# Patient Record
Sex: FEMALE | Race: BLACK OR AFRICAN AMERICAN | NOT HISPANIC OR LATINO | Employment: FULL TIME | URBAN - METROPOLITAN AREA
[De-identification: names, ages, dates, MRNs, and addresses within clinical notes are randomized per-mention and may not be internally consistent; named-entity substitution may affect disease eponyms.]

---

## 2017-01-27 ENCOUNTER — OFFICE VISIT (OUTPATIENT)
Dept: INTERNAL MEDICINE | Facility: CLINIC | Age: 42
End: 2017-01-27

## 2017-01-27 VITALS
DIASTOLIC BLOOD PRESSURE: 80 MMHG | BODY MASS INDEX: 51.71 KG/M2 | TEMPERATURE: 98.3 F | HEIGHT: 62 IN | HEART RATE: 72 BPM | SYSTOLIC BLOOD PRESSURE: 110 MMHG | WEIGHT: 281 LBS | OXYGEN SATURATION: 98 %

## 2017-01-27 DIAGNOSIS — E55.9 VITAMIN D DEFICIENCY: ICD-10-CM

## 2017-01-27 DIAGNOSIS — E66.01 MORBID OBESITY, UNSPECIFIED OBESITY TYPE (HCC): ICD-10-CM

## 2017-01-27 DIAGNOSIS — E11.9 DIABETES MELLITUS TYPE 2, NONINSULIN DEPENDENT (HCC): ICD-10-CM

## 2017-01-27 DIAGNOSIS — J30.2 SEASONAL ALLERGIC RHINITIS, UNSPECIFIED ALLERGIC RHINITIS TRIGGER: Primary | ICD-10-CM

## 2017-01-27 PROBLEM — J30.9 ALLERGIC RHINITIS: Status: ACTIVE | Noted: 2017-01-27

## 2017-01-27 PROCEDURE — 99213 OFFICE O/P EST LOW 20 MIN: CPT | Performed by: NURSE PRACTITIONER

## 2017-01-27 RX ORDER — MONTELUKAST SODIUM 10 MG/1
10 TABLET ORAL NIGHTLY
Qty: 30 TABLET | Refills: 5 | Status: SHIPPED | OUTPATIENT
Start: 2017-01-27 | End: 2018-02-13 | Stop reason: SDUPTHER

## 2017-01-27 NOTE — PROGRESS NOTES
Subjective   Candi Del Rio is a 41 y.o. female.     History of Present Illness   The patient is here today to F/U on obesity. Down 3 lbs from last visit. Did not start contrave, worried about SE profile. Family hx of seizures.   She is exercising twice daily most days of the week, walks for 1 hr and weights for 30 minutes.  Feeling well.     Will intermittently having coughing spell X1 week better with singulair.   The following portions of the patient's history were reviewed and updated as appropriate: allergies, current medications, past family history, past medical history, past social history, past surgical history and problem list.    Review of Systems   Constitutional: Negative.    Respiratory: Negative.    Cardiovascular: Negative.    Psychiatric/Behavioral: Negative.        Objective   Physical Exam   Constitutional: She appears well-developed and well-nourished.   HENT:   Right Ear: Hearing, tympanic membrane, external ear and ear canal normal.   Left Ear: Hearing, tympanic membrane, external ear and ear canal normal.   Nose: Mucosal edema present.   Mouth/Throat: Uvula is midline, oropharynx is clear and moist and mucous membranes are normal. Tonsils are 1+ on the right. Tonsils are 1+ on the left.   Neck: Normal range of motion. Neck supple. No thyromegaly present.   Cardiovascular: Normal rate, regular rhythm, normal heart sounds and intact distal pulses.    Pulmonary/Chest: Effort normal and breath sounds normal.   Skin: Skin is warm and dry.   Psychiatric: She has a normal mood and affect. Her behavior is normal. Judgment and thought content normal.       Assessment/Plan   Candi was seen today for obesity and cough.    Diagnoses and all orders for this visit:    Seasonal allergic rhinitis, unspecified allergic rhinitis trigger  -     montelukast (SINGULAIR) 10 MG tablet; Take 1 tablet by mouth Every Night.  -     Comprehensive Metabolic Panel; Future  -     Hemoglobin A1c; Future  -     Lipid Panel  With LDL / HDL Ratio; Future  -     Vitamin D 25 Hydroxy; Future    Morbid obesity, unspecified obesity type  -     Comprehensive Metabolic Panel; Future  -     Hemoglobin A1c; Future  -     Lipid Panel With LDL / HDL Ratio; Future  -     Vitamin D 25 Hydroxy; Future    Diabetes mellitus type 2, noninsulin dependent  -     Comprehensive Metabolic Panel; Future  -     Hemoglobin A1c; Future  -     Lipid Panel With LDL / HDL Ratio; Future  -     Vitamin D 25 Hydroxy; Future    Vitamin D deficiency  -     Comprehensive Metabolic Panel; Future  -     Hemoglobin A1c; Future  -     Lipid Panel With LDL / HDL Ratio; Future  -     Vitamin D 25 Hydroxy; Future        1. AR- continue singulair, add claritin/zyrtec, NSS  2. Obesity- Doing well with exercise and tracking calories. Make sure to get enough calories with activity- goal approx 1400 daily. Goal wt loss 6 lbs.   3. DM2- much improved with tracking calories, will draw lab before next visit.

## 2017-03-24 ENCOUNTER — RESULTS ENCOUNTER (OUTPATIENT)
Dept: INTERNAL MEDICINE | Facility: CLINIC | Age: 42
End: 2017-03-24

## 2017-03-24 DIAGNOSIS — E66.01 MORBID OBESITY, UNSPECIFIED OBESITY TYPE (HCC): ICD-10-CM

## 2017-03-24 DIAGNOSIS — J30.2 SEASONAL ALLERGIC RHINITIS, UNSPECIFIED ALLERGIC RHINITIS TRIGGER: ICD-10-CM

## 2017-03-24 DIAGNOSIS — E55.9 VITAMIN D DEFICIENCY: ICD-10-CM

## 2017-03-24 DIAGNOSIS — E11.9 DIABETES MELLITUS TYPE 2, NONINSULIN DEPENDENT (HCC): ICD-10-CM

## 2017-09-11 ENCOUNTER — TELEPHONE (OUTPATIENT)
Dept: INTERNAL MEDICINE | Facility: CLINIC | Age: 42
End: 2017-09-11

## 2017-09-11 ENCOUNTER — OFFICE VISIT (OUTPATIENT)
Dept: INTERNAL MEDICINE | Facility: CLINIC | Age: 42
End: 2017-09-11

## 2017-09-11 VITALS
SYSTOLIC BLOOD PRESSURE: 128 MMHG | DIASTOLIC BLOOD PRESSURE: 80 MMHG | HEART RATE: 73 BPM | OXYGEN SATURATION: 97 % | WEIGHT: 273.1 LBS | BODY MASS INDEX: 50.26 KG/M2 | HEIGHT: 62 IN

## 2017-09-11 DIAGNOSIS — E11.9 DIABETES MELLITUS TYPE 2, NONINSULIN DEPENDENT (HCC): ICD-10-CM

## 2017-09-11 DIAGNOSIS — E66.01 MORBID OBESITY, UNSPECIFIED OBESITY TYPE (HCC): ICD-10-CM

## 2017-09-11 DIAGNOSIS — E11.9 DIABETES MELLITUS TYPE 2, NONINSULIN DEPENDENT (HCC): Primary | ICD-10-CM

## 2017-09-11 LAB
25(OH)D3+25(OH)D2 SERPL-MCNC: 18.2 NG/ML (ref 30–100)
ALBUMIN SERPL-MCNC: 4.1 G/DL (ref 3.5–5.2)
ALBUMIN/GLOB SERPL: 1.4 G/DL
ALP SERPL-CCNC: 55 U/L (ref 39–117)
ALT SERPL-CCNC: 16 U/L (ref 1–33)
AST SERPL-CCNC: 12 U/L (ref 1–32)
BILIRUB SERPL-MCNC: 0.3 MG/DL (ref 0.1–1.2)
BUN SERPL-MCNC: 9 MG/DL (ref 6–20)
BUN/CREAT SERPL: 11.4 (ref 7–25)
CALCIUM SERPL-MCNC: 9.3 MG/DL (ref 8.6–10.5)
CHLORIDE SERPL-SCNC: 101 MMOL/L (ref 98–107)
CHOLEST SERPL-MCNC: 159 MG/DL (ref 0–200)
CO2 SERPL-SCNC: 26.1 MMOL/L (ref 22–29)
CREAT SERPL-MCNC: 0.79 MG/DL (ref 0.57–1)
GLOBULIN SER CALC-MCNC: 2.9 GM/DL
GLUCOSE SERPL-MCNC: 184 MG/DL (ref 65–99)
HBA1C MFR BLD: 7.77 % (ref 4.8–5.6)
HDLC SERPL-MCNC: 51 MG/DL (ref 40–60)
LDLC SERPL CALC-MCNC: 84 MG/DL (ref 0–100)
LDLC/HDLC SERPL: 1.65 {RATIO}
POTASSIUM SERPL-SCNC: 4.2 MMOL/L (ref 3.5–5.2)
PROT SERPL-MCNC: 7 G/DL (ref 6–8.5)
SODIUM SERPL-SCNC: 138 MMOL/L (ref 136–145)
TRIGL SERPL-MCNC: 119 MG/DL (ref 0–150)
TSH SERPL DL<=0.005 MIU/L-ACNC: 2.19 MIU/ML (ref 0.27–4.2)
VLDLC SERPL CALC-MCNC: 23.8 MG/DL (ref 5–40)

## 2017-09-11 PROCEDURE — 99213 OFFICE O/P EST LOW 20 MIN: CPT | Performed by: NURSE PRACTITIONER

## 2017-09-11 RX ORDER — METFORMIN HYDROCHLORIDE 500 MG/1
1000 TABLET, EXTENDED RELEASE ORAL 2 TIMES DAILY
Qty: 120 TABLET | Refills: 0 | Status: SHIPPED | OUTPATIENT
Start: 2017-09-11 | End: 2017-11-13 | Stop reason: SDUPTHER

## 2017-09-11 RX ORDER — METFORMIN HYDROCHLORIDE 500 MG/1
1000 TABLET, EXTENDED RELEASE ORAL 2 TIMES DAILY
Qty: 60 TABLET | Refills: 3 | Status: SHIPPED | OUTPATIENT
Start: 2017-09-11 | End: 2017-09-11 | Stop reason: SDUPTHER

## 2017-09-11 NOTE — PROGRESS NOTES
Subjective   Candi Del Rio is a 41 y.o. female who is here to discuss her elevated blood sugars. She went to the ER last week and her blood sugars were 245, they said she was dehydrated.     History of Present Illness   The patient is here today to F/U on ER visit.  September 4, 2017 for blood sugar levels 235 and elevated blood pressure.  Patient's blood pressure was systolic 140 over 80s.  Patient reported transient episode of diaphoresis, dizziness and tinnitus.  Those symptoms resolved.  DM2- She had decreased her metformin from 2 to 1 tablets daily.  Down 11 lbs since December. She has noticed she is urinating a lot. She is trying to hydrate well.   The following portions of the patient's history were reviewed and updated as appropriate: allergies, current medications, past family history, past medical history, past social history, past surgical history and problem list.    Review of Systems   Constitutional: Negative.    Respiratory: Negative.    Cardiovascular: Negative.    Endocrine: Positive for polydipsia and polyuria.   Psychiatric/Behavioral: Negative.    All other systems reviewed and are negative.      Objective   Physical Exam   Constitutional: She appears well-developed and well-nourished.   Neck: Normal range of motion. Neck supple. No thyromegaly present.   Cardiovascular: Normal rate, regular rhythm, normal heart sounds and intact distal pulses.    Pulmonary/Chest: Effort normal and breath sounds normal.   Skin: Skin is warm and dry.   Psychiatric: She has a normal mood and affect. Her behavior is normal. Judgment and thought content normal.       Assessment/Plan    Admission on 12/16/2016, Discharged on 12/16/2016   Component Date Value Ref Range Status   • Glucose 12/16/2016 157* 70 - 130 mg/dL Final   • Case Report 12/16/2016    Final                    Value:Surgical Pathology Report                         Case: [id number]                                  Authorizing Provider:  Darlene  MD Sam          Collected:           12/16/2016 12:14 PM          Ordering Location:     [facility]   Received:            12/16/2016 01:09 PM                                 OR                                                                           Pathologist:           Tatiana Rubio MD                                                           Specimen:    Large Intestine, Cecum, cecal polyps X 2                                                  • Final Diagnosis 12/16/2016    Final                    Value:This result contains rich text formatting which cannot be displayed here.     There are no diagnoses linked to this encounter.    1. DM2- go back to metformin BID, try to track calories. Will discuss ACE and statin at lab follow, draw labs today.   2. Obesity- get back to tracking calories. Try to walk.     Flu vaccine- defers

## 2017-09-11 NOTE — TELEPHONE ENCOUNTER
----- Message from Pooja Santo sent at 9/11/2017 12:01 PM EDT -----  Regarding: REFILL CLARIFICATION  Dion called for clarification on script. Metformin was sent in for 2 tablets, 2 times per day with qty 60. The 60 is only a 15 day supply unless it is actually 1 tablet 2 times per day. They need to know if the dosage or the quantity needs to be changed. Please call them at [phone]. Thanks

## 2017-09-25 ENCOUNTER — OFFICE VISIT (OUTPATIENT)
Dept: INTERNAL MEDICINE | Facility: CLINIC | Age: 42
End: 2017-09-25

## 2017-09-25 VITALS
DIASTOLIC BLOOD PRESSURE: 86 MMHG | WEIGHT: 279 LBS | OXYGEN SATURATION: 98 % | HEART RATE: 93 BPM | SYSTOLIC BLOOD PRESSURE: 132 MMHG | BODY MASS INDEX: 51.34 KG/M2 | HEIGHT: 62 IN

## 2017-09-25 DIAGNOSIS — E66.01 MORBID OBESITY, UNSPECIFIED OBESITY TYPE (HCC): ICD-10-CM

## 2017-09-25 DIAGNOSIS — E11.9 DIABETES MELLITUS TYPE 2, NONINSULIN DEPENDENT (HCC): Primary | ICD-10-CM

## 2017-09-25 DIAGNOSIS — E55.9 VITAMIN D DEFICIENCY: ICD-10-CM

## 2017-09-25 PROCEDURE — 99214 OFFICE O/P EST MOD 30 MIN: CPT | Performed by: NURSE PRACTITIONER

## 2017-09-25 RX ORDER — ERGOCALCIFEROL 1.25 MG/1
50000 CAPSULE ORAL WEEKLY
Qty: 4 CAPSULE | Refills: 1 | Status: SHIPPED | OUTPATIENT
Start: 2017-09-25 | End: 2017-11-14

## 2017-09-25 NOTE — PROGRESS NOTES
"Subjective   Candi Del Rio is a 42 y.o. female here for follow up on diabetes.    History of Present Illness   The patient is here today to follow-up on diabetes.  At last visit metformin restarted twice daily.  Labs were drawn.  She is taking her medicine daily. She is taking her BG BID, typical fasting is 120s and before dinner around 150s.   She is not tracking calories.   She just started yoga.   Sister Etta Ya.   The following portions of the patient's history were reviewed and updated as appropriate: allergies, current medications, past family history, past medical history, past social history, past surgical history and problem list.    Review of Systems   Constitutional: Negative.    Respiratory: Negative.    Cardiovascular: Negative.    All other systems reviewed and are negative.      Objective   Physical Exam   Constitutional: She appears well-developed and well-nourished.   Neck: Normal range of motion. Neck supple. No thyromegaly present.   Cardiovascular: Normal rate, regular rhythm, normal heart sounds and intact distal pulses.    Pulmonary/Chest: Effort normal and breath sounds normal.   Skin: Skin is warm and dry.   Psychiatric: She has a normal mood and affect. Her behavior is normal. Judgment and thought content normal.     Vitals:    09/25/17 1527   BP: 132/86   BP Location: Right arm   Patient Position: Sitting   Pulse: 93   SpO2: 98%   Weight: 279 lb (127 kg)   Height: 62\" (157.5 cm)       Current Outpatient Prescriptions:   •  Cholecalciferol (VITAMIN D3) 2000 UNITS tablet, Take 1 tablet by mouth Daily., Disp: , Rfl:   •  metFORMIN ER (GLUCOPHAGE XR) 500 MG 24 hr tablet, Take 2 tablets by mouth 2 (Two) Times a Day for 30 days., Disp: 120 tablet, Rfl: 0  •  vitamin B-12 (CYANOCOBALAMIN) 1000 MCG tablet, Place 1 tablet under the tongue daily., Disp: , Rfl:   •  montelukast (SINGULAIR) 10 MG tablet, Take 1 tablet by mouth Every Night., Disp: 30 tablet, Rfl: 5  •  Multiple Vitamins-Minerals " (MULTI FOR HER PO), Take 1 tablet by mouth Daily., Disp: , Rfl:   Office Visit on 09/11/2017   Component Date Value Ref Range Status   • Glucose 09/11/2017 184* 65 - 99 mg/dL Final   • BUN 09/11/2017 9  6 - 20 mg/dL Final   • Creatinine 09/11/2017 0.79  0.57 - 1.00 mg/dL Final   • eGFR Non  Am 09/11/2017 80  >60 mL/min/1.73 Final   • eGFR African Am 09/11/2017 97  >60 mL/min/1.73 Final   • BUN/Creatinine Ratio 09/11/2017 11.4  7.0 - 25.0 Final   • Sodium 09/11/2017 138  136 - 145 mmol/L Final   • Potassium 09/11/2017 4.2  3.5 - 5.2 mmol/L Final   • Chloride 09/11/2017 101  98 - 107 mmol/L Final   • Total CO2 09/11/2017 26.1  22.0 - 29.0 mmol/L Final   • Calcium 09/11/2017 9.3  8.6 - 10.5 mg/dL Final   • Total Protein 09/11/2017 7.0  6.0 - 8.5 g/dL Final   • Albumin 09/11/2017 4.10  3.50 - 5.20 g/dL Final   • Globulin 09/11/2017 2.9  gm/dL Final   • A/G Ratio 09/11/2017 1.4  g/dL Final   • Total Bilirubin 09/11/2017 0.3  0.1 - 1.2 mg/dL Final   • Alkaline Phosphatase 09/11/2017 55  39 - 117 U/L Final   • AST (SGOT) 09/11/2017 12  1 - 32 U/L Final   • ALT (SGPT) 09/11/2017 16  1 - 33 U/L Final   • Hemoglobin A1C 09/11/2017 7.77* 4.80 - 5.60 % Final    Comment: Hemoglobin A1C Ranges:  Increased Risk for Diabetes  5.7% to 6.4%  Diabetes                     >= 6.5%  Diabetic Goal                < 7.0%     • Total Cholesterol 09/11/2017 159  0 - 200 mg/dL Final   • Triglycerides 09/11/2017 119  0 - 150 mg/dL Final   • HDL Cholesterol 09/11/2017 51  40 - 60 mg/dL Final   • VLDL Cholesterol 09/11/2017 23.8  5 - 40 mg/dL Final   • LDL Cholesterol  09/11/2017 84  0 - 100 mg/dL Final   • LDL/HDL Ratio 09/11/2017 1.65   Final   • 25 Hydroxy, Vitamin D 09/11/2017 18.2* 30.0 - 100.0 ng/mL Final    Comment: Reference Range for Total Vitamin D 25(OH)  Deficiency    <20.0 ng/mL  Insufficiency 21-29 ng/mL  Sufficiency    ng/mL  Toxicity      >100 ng/ml        • TSH 09/11/2017 2.19  0.27 - 4.2 mIU/mL Final        Assessment/Plan   There are no diagnoses linked to this encounter.    1. DM2- Agree with pt she should go back to weight watchers. Continue yoga. Work on wt loss. Continue metformin BID. Recheck in 3 months. Discussed statin and ACE pt would prefer to stay off. We will see how numbers look at check up.   Could GLP1 if needed for further.   2. Vit d def- Vit D2 50,000 IU once weekly X8 weeks then Vit D 3  2000 IU daily.  3. Obesity- get back on weight watchers.

## 2017-11-13 DIAGNOSIS — E11.9 DIABETES MELLITUS TYPE 2, NONINSULIN DEPENDENT (HCC): ICD-10-CM

## 2017-11-14 RX ORDER — METFORMIN HYDROCHLORIDE 500 MG/1
TABLET, EXTENDED RELEASE ORAL
Qty: 120 TABLET | Refills: 0 | Status: SHIPPED | OUTPATIENT
Start: 2017-11-14 | End: 2017-12-27 | Stop reason: SDUPTHER

## 2017-12-19 DIAGNOSIS — E11.9 DIABETES MELLITUS TYPE 2, NONINSULIN DEPENDENT (HCC): ICD-10-CM

## 2017-12-19 DIAGNOSIS — E55.9 VITAMIN D DEFICIENCY: ICD-10-CM

## 2017-12-19 DIAGNOSIS — E66.01 MORBID OBESITY, UNSPECIFIED OBESITY TYPE (HCC): ICD-10-CM

## 2017-12-23 LAB
25(OH)D3+25(OH)D2 SERPL-MCNC: 20.9 NG/ML (ref 30–100)
ALBUMIN SERPL-MCNC: 3.9 G/DL (ref 3.5–5.2)
ALBUMIN/GLOB SERPL: 1.2 G/DL
ALP SERPL-CCNC: 60 U/L (ref 39–117)
ALT SERPL-CCNC: 15 U/L (ref 1–33)
AST SERPL-CCNC: 13 U/L (ref 1–32)
BILIRUB SERPL-MCNC: 0.2 MG/DL (ref 0.1–1.2)
BUN SERPL-MCNC: 8 MG/DL (ref 6–20)
BUN/CREAT SERPL: 10.5 (ref 7–25)
CALCIUM SERPL-MCNC: 9.1 MG/DL (ref 8.6–10.5)
CHLORIDE SERPL-SCNC: 101 MMOL/L (ref 98–107)
CO2 SERPL-SCNC: 27.1 MMOL/L (ref 22–29)
CREAT SERPL-MCNC: 0.76 MG/DL (ref 0.57–1)
GLOBULIN SER CALC-MCNC: 3.2 GM/DL
GLUCOSE SERPL-MCNC: 191 MG/DL (ref 65–99)
HBA1C MFR BLD: 8.45 % (ref 4.8–5.6)
POTASSIUM SERPL-SCNC: 4.4 MMOL/L (ref 3.5–5.2)
PROT SERPL-MCNC: 7.1 G/DL (ref 6–8.5)
SODIUM SERPL-SCNC: 141 MMOL/L (ref 136–145)

## 2017-12-27 ENCOUNTER — OFFICE VISIT (OUTPATIENT)
Dept: INTERNAL MEDICINE | Facility: CLINIC | Age: 42
End: 2017-12-27

## 2017-12-27 VITALS
SYSTOLIC BLOOD PRESSURE: 120 MMHG | HEART RATE: 85 BPM | OXYGEN SATURATION: 97 % | BODY MASS INDEX: 50.79 KG/M2 | HEIGHT: 62 IN | DIASTOLIC BLOOD PRESSURE: 70 MMHG | WEIGHT: 276 LBS

## 2017-12-27 DIAGNOSIS — E66.8 EXTREME OBESITY: ICD-10-CM

## 2017-12-27 DIAGNOSIS — E11.9 DIABETES MELLITUS TYPE 2, NONINSULIN DEPENDENT (HCC): Primary | ICD-10-CM

## 2017-12-27 DIAGNOSIS — E55.9 VITAMIN D DEFICIENCY: ICD-10-CM

## 2017-12-27 PROCEDURE — 99214 OFFICE O/P EST MOD 30 MIN: CPT | Performed by: NURSE PRACTITIONER

## 2017-12-27 RX ORDER — METFORMIN HYDROCHLORIDE 500 MG/1
1000 TABLET, EXTENDED RELEASE ORAL
Qty: 120 TABLET | Refills: 5 | Status: SHIPPED | OUTPATIENT
Start: 2017-12-27 | End: 2019-02-06

## 2017-12-27 NOTE — PROGRESS NOTES
Subjective   Candi Del Rio is a 42 y.o. female here for a follow up on diabetes.    History of Present Illness   The patient is here today to F/U on lab work. Pt did not do weight watchers, needs Rx so she can use [employer] money.   Diabetes- she did not eat well over the holidays. Pt is doing well with current medication regimen, denies adverse reactions, compliant with medication schedule.   The following portions of the patient's history were reviewed and updated as appropriate: allergies, current medications, past family history, past medical history, past social history, past surgical history and problem list.    Review of Systems   Constitutional: Negative.    Respiratory: Negative.    Cardiovascular: Negative.    Psychiatric/Behavioral: Negative.        Objective   Physical Exam   Constitutional: She appears well-developed and well-nourished.   Neck: Normal range of motion. Neck supple. No thyromegaly present.   Cardiovascular: Normal rate, regular rhythm, normal heart sounds and intact distal pulses.    Pulmonary/Chest: Effort normal and breath sounds normal.   Skin: Skin is warm and dry.   Psychiatric: She has a normal mood and affect. Her behavior is normal. Judgment and thought content normal.     Vitals:    12/27/17 1146   BP: 120/70   Pulse: 85   SpO2: 97%       Current Outpatient Prescriptions:   •  Cholecalciferol (VITAMIN D3) 2000 UNITS tablet, Take 1 tablet by mouth Daily., Disp: , Rfl:   •  metFORMIN ER (GLUCOPHAGE-XR) 500 MG 24 hr tablet, TAKE TWO TABLETS BY MOUTH TWICE A DAY, Disp: 120 tablet, Rfl: 0  •  montelukast (SINGULAIR) 10 MG tablet, Take 1 tablet by mouth Every Night., Disp: 30 tablet, Rfl: 5  •  Multiple Vitamins-Minerals (MULTI FOR HER PO), Take 1 tablet by mouth Daily., Disp: , Rfl:   •  vitamin B-12 (CYANOCOBALAMIN) 1000 MCG tablet, Place 1 tablet under the tongue daily., Disp: , Rfl:   Orders Only on 12/22/2017   Component Date Value Ref Range Status   • Glucose 12/22/2017 191* 65 - 99  mg/dL Final   • BUN 12/22/2017 8  6 - 20 mg/dL Final   • Creatinine 12/22/2017 0.76  0.57 - 1.00 mg/dL Final   • eGFR Non  Am 12/22/2017 83  >60 mL/min/1.73 Final   • eGFR African Am 12/22/2017 101  >60 mL/min/1.73 Final   • BUN/Creatinine Ratio 12/22/2017 10.5  7.0 - 25.0 Final   • Sodium 12/22/2017 141  136 - 145 mmol/L Final   • Potassium 12/22/2017 4.4  3.5 - 5.2 mmol/L Final   • Chloride 12/22/2017 101  98 - 107 mmol/L Final   • Total CO2 12/22/2017 27.1  22.0 - 29.0 mmol/L Final   • Calcium 12/22/2017 9.1  8.6 - 10.5 mg/dL Final   • Total Protein 12/22/2017 7.1  6.0 - 8.5 g/dL Final   • Albumin 12/22/2017 3.90  3.50 - 5.20 g/dL Final   • Globulin 12/22/2017 3.2  gm/dL Final   • A/G Ratio 12/22/2017 1.2  g/dL Final   • Total Bilirubin 12/22/2017 0.2  0.1 - 1.2 mg/dL Final   • Alkaline Phosphatase 12/22/2017 60  39 - 117 U/L Final   • AST (SGOT) 12/22/2017 13  1 - 32 U/L Final   • ALT (SGPT) 12/22/2017 15  1 - 33 U/L Final   • Hemoglobin A1C 12/22/2017 8.45* 4.80 - 5.60 % Final    Comment: Hemoglobin A1C Ranges:  Increased Risk for Diabetes  5.7% to 6.4%  Diabetes                     >= 6.5%  Diabetic Goal                < 7.0%     • 25 Hydroxy, Vitamin D 12/22/2017 20.9* 30.0 - 100.0 ng/mL Final    Comment: Reference Range for Total Vitamin D 25(OH)  Deficiency    <20.0 ng/mL  Insufficiency 21-29 ng/mL  Sufficiency    ng/mL  Toxicity      >100 ng/ml            Assessment/Plan   There are no diagnoses linked to this encounter.    1. DM2- continue metformin, add in Victoza. Pt defers ACE and statin. Pt denies personal/family hx of thyroid cancer. Check BG daily. Return in 1 month to discuss dosing and BG numbers.   2. Vit D def- take daily  3. Obesity- encourage weight watchers    Mammo- reminded  Flu vaccine- defers

## 2017-12-28 ENCOUNTER — TELEPHONE (OUTPATIENT)
Dept: INTERNAL MEDICINE | Facility: CLINIC | Age: 42
End: 2017-12-28

## 2017-12-28 NOTE — TELEPHONE ENCOUNTER
----- Message from Roberto Carlos Crump Rep sent at 12/27/2017  3:24 PM EST -----  Regarding: PATIENT CALL  Contact: [phone]  Patient said she was told to call back with what kind of meter she has - it is a True Metrix.

## 2017-12-29 RX ORDER — GLUCOSAM/CHON-MSM1/C/MANG/BOSW 500-416.6
1 TABLET ORAL DAILY
Qty: 100 EACH | Refills: 12 | Status: SHIPPED | OUTPATIENT
Start: 2017-12-29 | End: 2020-07-22

## 2017-12-29 RX ORDER — CALCIUM CITRATE/VITAMIN D3 200MG-6.25
TABLET ORAL
Qty: 100 EACH | Refills: 12 | Status: SHIPPED | OUTPATIENT
Start: 2017-12-29 | End: 2019-07-02 | Stop reason: SDUPTHER

## 2017-12-29 NOTE — TELEPHONE ENCOUNTER
Pt called to state that she never got pen needles sent in for her victoza, lancets or test strips. Those were called into her pharmacy.

## 2018-01-05 ENCOUNTER — HOSPITAL ENCOUNTER (OUTPATIENT)
Dept: DIABETES SERVICES | Facility: HOSPITAL | Age: 43
Discharge: HOME | End: 2018-01-05
Admitting: NURSE PRACTITIONER

## 2018-01-05 PROCEDURE — G0109 DIAB MANAGE TRN IND/GROUP: HCPCS

## 2018-01-05 NOTE — PROGRESS NOTES
"Nutrition Services    Patient Name:  Candi Del Rio  YOB: 1975  MRN: [id number]  Admit Date:  1/5/2018     Nutrition Note:  Date attended class:  1-5-18  Pt. with education needs identified as related to dx diabetes as evidenced by pt. interview/MD order for education and discussion with nurse educator.  Pt. reports a desire to increase understanding of diabetes in general including nutritional management.                  Pt. has a history of B12 deficiency, obesity, vitamin D deficiency  Pt. is a smoker:  (x ) No ( ) Yes  Pertinent labs:  12-22-17 fasting glucose 191, a1c=8.45  Pt. is not following a special diet or meal plan, eats 2 meals each day and snacks in between.  Food allergies:  ( x ) No  ( ) Yes  Pt. takes a dietary supplements:  (  ) No  (x) Yes, vitamin D and B12  Pt. consumes alcoholic beverages:  (x ) No  ( ) Yes.  Intake based upon dietary recall/pt. interview appears to include 2 meals each day and snacks in between  Learning needs/barriers identified:  (x ) No  ( )  Yes,  .  Activity:  no regular exercise  Estimated current energy needs to be:  1,616 kcals based upon Carlisle-St. Jeor equation with no activity factor minus 250 kcals (sex=F , age=42, ht. = 62 \", wt.= 276 #, BMI=50.6).  Pt. educated on carbohydrate food sources and desire for distribution throughout the day, food selection, reading a food label, unsaturated fats versus saturated fats and portion control.  Pt. provided with the [facility] Diabetes booklet and individualized meal plan as well as the RD and RN contact information for future questions and continuing support.      Pt. ( x ) verbalized (x ) demonstrated good understanding based upon ability to help complete sample meals.    Expected compliance is:  ( ) fair  ( x ) good  ( ) poor    Support plan:  daughter and spouse    Electronically signed by:  Gabriela eHrnandez RD  01/05/18 6:18 PM   "

## 2018-01-29 ENCOUNTER — OFFICE VISIT (OUTPATIENT)
Dept: INTERNAL MEDICINE | Facility: CLINIC | Age: 43
End: 2018-01-29

## 2018-01-29 VITALS
HEIGHT: 62 IN | OXYGEN SATURATION: 98 % | SYSTOLIC BLOOD PRESSURE: 120 MMHG | BODY MASS INDEX: 50.79 KG/M2 | DIASTOLIC BLOOD PRESSURE: 70 MMHG | WEIGHT: 276 LBS | HEART RATE: 88 BPM

## 2018-01-29 DIAGNOSIS — E11.9 DIABETES MELLITUS TYPE 2, NONINSULIN DEPENDENT (HCC): Primary | ICD-10-CM

## 2018-01-29 PROCEDURE — 99213 OFFICE O/P EST LOW 20 MIN: CPT | Performed by: NURSE PRACTITIONER

## 2018-01-29 NOTE — PROGRESS NOTES
Subjective   Candi Del Rio is a 42 y.o. female who is here to fo;;ow up on DM and the new start on Victoza. She is on 1.2mg daily. Needs a Mammo order entered.     History of Present Illness   Patient is here today to follow-up on diabetes.  At last visit Victoza  Initiated. Denies SEs.   BG at home is running. She went to the diabetes education class and found it really beneficial. Going to [employer].   The following portions of the patient's history were reviewed and updated as appropriate: allergies, current medications, past family history, past medical history, past social history, past surgical history and problem list.    Review of Systems   Constitutional: Negative.    Respiratory: Negative.    Cardiovascular: Negative.    Psychiatric/Behavioral: Negative.    All other systems reviewed and are negative.      Objective   Physical Exam   Constitutional: She appears well-developed and well-nourished.   Neck: Normal range of motion. Neck supple. No thyromegaly present.   Cardiovascular: Normal rate, regular rhythm, normal heart sounds and intact distal pulses.    Pulmonary/Chest: Effort normal and breath sounds normal.    Candi had a diabetic foot exam performed today.   During the foot exam she had a monofilament test performed.    Vascular Status -  Her exam exhibits right foot vasculature normal. Her exam exhibits left foot vasculature normal.   Skin Integrity  -  Her right foot skin is intact.  She has right heel is dry and cracked.    Candi 's left foot skin is intact. .  Skin: Skin is warm and dry.   Psychiatric: She has a normal mood and affect. Her behavior is normal. Judgment and thought content normal.     Vitals:    01/29/18 1106   BP: 120/70   Pulse: 88   SpO2: 98%     Orders Only on 12/22/2017   Component Date Value Ref Range Status   • Glucose 12/22/2017 191* 65 - 99 mg/dL Final   • BUN 12/22/2017 8  6 - 20 mg/dL Final   • Creatinine 12/22/2017 0.76  0.57 - 1.00 mg/dL Final   • eGFR Non   Am 12/22/2017 83  >60 mL/min/1.73 Final   • eGFR African Am 12/22/2017 101  >60 mL/min/1.73 Final   • BUN/Creatinine Ratio 12/22/2017 10.5  7.0 - 25.0 Final   • Sodium 12/22/2017 141  136 - 145 mmol/L Final   • Potassium 12/22/2017 4.4  3.5 - 5.2 mmol/L Final   • Chloride 12/22/2017 101  98 - 107 mmol/L Final   • Total CO2 12/22/2017 27.1  22.0 - 29.0 mmol/L Final   • Calcium 12/22/2017 9.1  8.6 - 10.5 mg/dL Final   • Total Protein 12/22/2017 7.1  6.0 - 8.5 g/dL Final   • Albumin 12/22/2017 3.90  3.50 - 5.20 g/dL Final   • Globulin 12/22/2017 3.2  gm/dL Final   • A/G Ratio 12/22/2017 1.2  g/dL Final   • Total Bilirubin 12/22/2017 0.2  0.1 - 1.2 mg/dL Final   • Alkaline Phosphatase 12/22/2017 60  39 - 117 U/L Final   • AST (SGOT) 12/22/2017 13  1 - 32 U/L Final   • ALT (SGPT) 12/22/2017 15  1 - 33 U/L Final   • Hemoglobin A1C 12/22/2017 8.45* 4.80 - 5.60 % Final    Comment: Hemoglobin A1C Ranges:  Increased Risk for Diabetes  5.7% to 6.4%  Diabetes                     >= 6.5%  Diabetic Goal                < 7.0%     • 25 Hydroxy, Vitamin D 12/22/2017 20.9* 30.0 - 100.0 ng/mL Final    Comment: Reference Range for Total Vitamin D 25(OH)  Deficiency    <20.0 ng/mL  Insufficiency 21-29 ng/mL  Sufficiency    ng/mL  Toxicity      >100 ng/ml          Current Outpatient Prescriptions:   •  Cholecalciferol (VITAMIN D3) 2000 UNITS tablet, Take 1 tablet by mouth Daily., Disp: , Rfl:   •  Insulin Pen Needle 32G X 6 MM misc, 1 each Daily., Disp: 100 each, Rfl: 2  •  Liraglutide (VICTOZA) 18 MG/3ML solution pen-injector injection, Inject 1.8 mg under the skin Daily. (Patient taking differently: Inject 1.2 mg under the skin Daily.), Disp: 3 pen, Rfl: 5  •  metFORMIN ER (GLUCOPHAGE-XR) 500 MG 24 hr tablet, Take 2 tablets by mouth 2 (Two) Times a Day., Disp: 120 tablet, Rfl: 5  •  montelukast (SINGULAIR) 10 MG tablet, Take 1 tablet by mouth Every Night., Disp: 30 tablet, Rfl: 5  •  Multiple Vitamins-Minerals (MULTI FOR  HER PO), Take 1 tablet by mouth Daily., Disp: , Rfl:   •  NOVOTWIST 32G X 5 MM misc, , Disp: , Rfl:   •  TRUE METRIX BLOOD GLUCOSE TEST test strip, Use as instructed, Disp: 100 each, Rfl: 12  •  TRUEPLUS LANCETS 30G misc, 1 each Daily., Disp: 100 each, Rfl: 12  •  vitamin B-12 (CYANOCOBALAMIN) 1000 MCG tablet, Place 1 tablet under the tongue daily., Disp: , Rfl:   Assessment/Plan   There are no diagnoses linked to this encounter.    1. DM2- continue Victoza, increase to 1.8 mg in a week. Continue to work on eating well and exercise. Pt is taking metformin 1000 mg twice a day.

## 2018-02-13 DIAGNOSIS — J30.2 SEASONAL ALLERGIC RHINITIS: ICD-10-CM

## 2018-02-13 RX ORDER — MONTELUKAST SODIUM 10 MG/1
TABLET ORAL
Qty: 30 TABLET | Refills: 4 | OUTPATIENT
Start: 2018-02-13 | End: 2021-08-05

## 2018-03-23 DIAGNOSIS — E66.8 EXTREME OBESITY: ICD-10-CM

## 2018-03-23 DIAGNOSIS — E55.9 VITAMIN D DEFICIENCY: ICD-10-CM

## 2018-03-23 DIAGNOSIS — E11.9 DIABETES MELLITUS TYPE 2, NONINSULIN DEPENDENT (HCC): ICD-10-CM

## 2018-03-26 LAB
ALBUMIN SERPL-MCNC: 4 G/DL (ref 3.5–5.2)
ALBUMIN/GLOB SERPL: 1.2 G/DL
ALP SERPL-CCNC: 63 U/L (ref 39–117)
ALT SERPL-CCNC: 14 U/L (ref 1–33)
AST SERPL-CCNC: 13 U/L (ref 1–32)
BASOPHILS # BLD AUTO: 0.02 10*3/MM3 (ref 0–0.2)
BASOPHILS NFR BLD AUTO: 0.4 % (ref 0–1.5)
BILIRUB SERPL-MCNC: 0.3 MG/DL (ref 0.1–1.2)
BUN SERPL-MCNC: 7 MG/DL (ref 6–20)
BUN/CREAT SERPL: 10.1 (ref 7–25)
CALCIUM SERPL-MCNC: 8.9 MG/DL (ref 8.6–10.5)
CHLORIDE SERPL-SCNC: 99 MMOL/L (ref 98–107)
CHOLEST SERPL-MCNC: 158 MG/DL (ref 0–200)
CO2 SERPL-SCNC: 26.3 MMOL/L (ref 22–29)
CREAT SERPL-MCNC: 0.69 MG/DL (ref 0.57–1)
EOSINOPHIL # BLD AUTO: 0.12 10*3/MM3 (ref 0–0.7)
EOSINOPHIL NFR BLD AUTO: 2.3 % (ref 0.3–6.2)
ERYTHROCYTE [DISTWIDTH] IN BLOOD BY AUTOMATED COUNT: 13.6 % (ref 11.7–13)
GFR SERPLBLD CREATININE-BSD FMLA CKD-EPI: 113 ML/MIN/1.73
GFR SERPLBLD CREATININE-BSD FMLA CKD-EPI: 93 ML/MIN/1.73
GLOBULIN SER CALC-MCNC: 3.3 GM/DL
GLUCOSE SERPL-MCNC: 212 MG/DL (ref 65–99)
HBA1C MFR BLD: 7.16 % (ref 4.8–5.6)
HCT VFR BLD AUTO: 39.4 % (ref 35.6–45.5)
HDLC SERPL-MCNC: 49 MG/DL (ref 40–60)
HGB BLD-MCNC: 12.1 G/DL (ref 11.9–15.5)
IMM GRANULOCYTES # BLD: 0 10*3/MM3 (ref 0–0.03)
IMM GRANULOCYTES NFR BLD: 0 % (ref 0–0.5)
LDLC SERPL CALC-MCNC: 81 MG/DL (ref 0–100)
LDLC/HDLC SERPL: 1.64 {RATIO}
LYMPHOCYTES # BLD AUTO: 1.76 10*3/MM3 (ref 0.9–4.8)
LYMPHOCYTES NFR BLD AUTO: 34.1 % (ref 19.6–45.3)
MCH RBC QN AUTO: 28.9 PG (ref 26.9–32)
MCHC RBC AUTO-ENTMCNC: 30.7 G/DL (ref 32.4–36.3)
MCV RBC AUTO: 94.3 FL (ref 80.5–98.2)
MONOCYTES # BLD AUTO: 0.39 10*3/MM3 (ref 0.2–1.2)
MONOCYTES NFR BLD AUTO: 7.6 % (ref 5–12)
NEUTROPHILS # BLD AUTO: 2.87 10*3/MM3 (ref 1.9–8.1)
NEUTROPHILS NFR BLD AUTO: 55.6 % (ref 42.7–76)
PLATELET # BLD AUTO: 342 10*3/MM3 (ref 140–500)
POTASSIUM SERPL-SCNC: 4.3 MMOL/L (ref 3.5–5.2)
PROT SERPL-MCNC: 7.3 G/DL (ref 6–8.5)
RBC # BLD AUTO: 4.18 10*6/MM3 (ref 3.9–5.2)
SODIUM SERPL-SCNC: 138 MMOL/L (ref 136–145)
TRIGL SERPL-MCNC: 142 MG/DL (ref 0–150)
VLDLC SERPL CALC-MCNC: 28.4 MG/DL (ref 5–40)
WBC # BLD AUTO: 5.16 10*3/MM3 (ref 4.5–10.7)

## 2018-03-29 ENCOUNTER — OFFICE VISIT (OUTPATIENT)
Dept: INTERNAL MEDICINE | Facility: CLINIC | Age: 43
End: 2018-03-29

## 2018-03-29 VITALS
HEIGHT: 62 IN | DIASTOLIC BLOOD PRESSURE: 80 MMHG | WEIGHT: 270 LBS | SYSTOLIC BLOOD PRESSURE: 120 MMHG | BODY MASS INDEX: 49.69 KG/M2 | OXYGEN SATURATION: 99 % | HEART RATE: 70 BPM

## 2018-03-29 DIAGNOSIS — J30.2 CHRONIC SEASONAL ALLERGIC RHINITIS, UNSPECIFIED TRIGGER: ICD-10-CM

## 2018-03-29 DIAGNOSIS — E11.9 DIABETES MELLITUS TYPE 2, NONINSULIN DEPENDENT (HCC): Primary | ICD-10-CM

## 2018-03-29 DIAGNOSIS — E66.8 EXTREME OBESITY: ICD-10-CM

## 2018-03-29 DIAGNOSIS — Z12.31 VISIT FOR SCREENING MAMMOGRAM: ICD-10-CM

## 2018-03-29 PROCEDURE — 99214 OFFICE O/P EST MOD 30 MIN: CPT | Performed by: NURSE PRACTITIONER

## 2018-03-29 NOTE — PROGRESS NOTES
Subjective   Candi Del Rio is a 42 y.o. female here for a follow up on diabetes.    History of Present Illness   Patient is here today to follow-up on lab work. She reports with victoza at higher dose felt lump on right side of neck, stopped 2 weeks ago and this area went away. She is down 6 more lbs since last check up.   She is doing well with eating a healthy diet and watching portion sizes. Does not eat past 8. Needs to work on exercise.   AR- doing well with singulair  The following portions of the patient's history were reviewed and updated as appropriate: allergies, current medications, past family history, past medical history, past social history, past surgical history and problem list.    Review of Systems   Constitutional: Negative.    Respiratory: Negative.    Cardiovascular: Negative.    Psychiatric/Behavioral: Negative.        Objective   Physical Exam   Constitutional: She appears well-developed and well-nourished.   Neck: Normal range of motion. Neck supple. No thyromegaly present.   Cardiovascular: Normal rate, regular rhythm, normal heart sounds and intact distal pulses.    Pulmonary/Chest: Effort normal and breath sounds normal.   Skin: Skin is warm and dry.   Psychiatric: She has a normal mood and affect. Her behavior is normal. Judgment and thought content normal.       Assessment/Plan   There are no diagnoses linked to this encounter.      1. Diabetes- ok to restart and use 1.2 mg dose, return if any new symptoms/lump found, pt defers statin  2. Obesity- goal to be down 15 lbs by June.   3. AR- well controlled    Screening mammo- will order

## 2018-04-06 ENCOUNTER — HOSPITAL ENCOUNTER (OUTPATIENT)
Dept: MAMMOGRAPHY | Facility: HOSPITAL | Age: 43
Discharge: HOME | End: 2018-04-06
Admitting: NURSE PRACTITIONER

## 2018-04-06 PROCEDURE — 77067 SCR MAMMO BI INCL CAD: CPT

## 2018-05-10 DIAGNOSIS — E55.9 VITAMIN D DEFICIENCY: ICD-10-CM

## 2018-05-11 RX ORDER — ERGOCALCIFEROL 1.25 MG/1
CAPSULE ORAL
Qty: 4 CAPSULE | Refills: 0 | OUTPATIENT
Start: 2018-05-11

## 2018-06-22 DIAGNOSIS — E66.8 EXTREME OBESITY: ICD-10-CM

## 2018-06-22 DIAGNOSIS — E11.9 DIABETES MELLITUS TYPE 2, NONINSULIN DEPENDENT (HCC): ICD-10-CM

## 2018-06-25 LAB
ALBUMIN SERPL-MCNC: 3.5 G/DL (ref 3.5–5.2)
ALBUMIN/GLOB SERPL: 1.3 G/DL
ALP SERPL-CCNC: 51 U/L (ref 39–117)
ALT SERPL-CCNC: 12 U/L (ref 1–33)
AST SERPL-CCNC: 11 U/L (ref 1–32)
BILIRUB SERPL-MCNC: 0.2 MG/DL (ref 0.1–1.2)
BUN SERPL-MCNC: 9 MG/DL (ref 6–20)
BUN/CREAT SERPL: 11.8 (ref 7–25)
CALCIUM SERPL-MCNC: 8.8 MG/DL (ref 8.6–10.5)
CHLORIDE SERPL-SCNC: 105 MMOL/L (ref 98–107)
CO2 SERPL-SCNC: 24.7 MMOL/L (ref 22–29)
CREAT SERPL-MCNC: 0.76 MG/DL (ref 0.57–1)
GFR SERPLBLD CREATININE-BSD FMLA CKD-EPI: 101 ML/MIN/1.73
GFR SERPLBLD CREATININE-BSD FMLA CKD-EPI: 83 ML/MIN/1.73
GLOBULIN SER CALC-MCNC: 2.8 GM/DL
GLUCOSE SERPL-MCNC: 161 MG/DL (ref 65–99)
HBA1C MFR BLD: 7.48 % (ref 4.8–5.6)
POTASSIUM SERPL-SCNC: 4.3 MMOL/L (ref 3.5–5.2)
PROT SERPL-MCNC: 6.3 G/DL (ref 6–8.5)
SODIUM SERPL-SCNC: 140 MMOL/L (ref 136–145)

## 2018-10-07 ENCOUNTER — OFFICE VISIT (OUTPATIENT)
Dept: RETAIL CLINIC | Facility: CLINIC | Age: 43
End: 2018-10-07

## 2018-10-07 VITALS
TEMPERATURE: 98.5 F | SYSTOLIC BLOOD PRESSURE: 120 MMHG | DIASTOLIC BLOOD PRESSURE: 82 MMHG | OXYGEN SATURATION: 97 % | HEART RATE: 84 BPM

## 2018-10-07 DIAGNOSIS — H66.002 ACUTE SUPPURATIVE OTITIS MEDIA OF LEFT EAR WITHOUT SPONTANEOUS RUPTURE OF TYMPANIC MEMBRANE, RECURRENCE NOT SPECIFIED: Primary | ICD-10-CM

## 2018-10-07 PROCEDURE — 99213 OFFICE O/P EST LOW 20 MIN: CPT | Performed by: NURSE PRACTITIONER

## 2018-10-07 RX ORDER — FLUTICASONE PROPIONATE 50 MCG
1 SPRAY, SUSPENSION (ML) NASAL 2 TIMES DAILY
Qty: 1 BOTTLE | Refills: 0 | Status: SHIPPED | OUTPATIENT
Start: 2018-10-07 | End: 2018-10-21

## 2018-10-07 RX ORDER — AMOXICILLIN 875 MG/1
875 TABLET, COATED ORAL 2 TIMES DAILY
Qty: 20 TABLET | Refills: 0 | Status: SHIPPED | OUTPATIENT
Start: 2018-10-07 | End: 2018-10-17

## 2018-10-07 NOTE — PROGRESS NOTES
Subjective     Candi Del Rio is a 43 y.o.. female.     Fever    This is a new problem. Episode onset: 2 days. The problem has been unchanged. The maximum temperature noted was 102 to 102.9 F. The temperature was taken using a tympanic thermometer. Associated symptoms include congestion, headaches, nausea and a sore throat. Pertinent negatives include no abdominal pain, coughing, diarrhea, ear pain or vomiting. Treatments tried: theraflu, vicks vapor rub, nightquil, mucinex, psuedaphed. The treatment provided mild relief.       The following portions of the patient's history were reviewed and updated as appropriate: allergies, current medications, past family history, past medical history, past social history, past surgical history and problem list.    Review of Systems   Constitutional: Positive for fever (102 this am).   HENT: Positive for congestion, postnasal drip, sinus pressure (maxillary ) and sore throat. Negative for ear pain.    Respiratory: Negative for cough.    Gastrointestinal: Positive for nausea. Negative for abdominal pain, diarrhea and vomiting.   Neurological: Positive for headaches.       Objective     Vitals:    10/07/18 1500   BP: 120/82   Pulse: 84   Temp: 98.5 °F (36.9 °C)   TempSrc: Oral   SpO2: 97%       Physical Exam   Constitutional: She is oriented to person, place, and time. She appears well-developed and well-nourished.   HENT:   Head: Normocephalic and atraumatic.   Right Ear: Tympanic membrane normal. Tympanic membrane is not erythematous.   Left Ear: Tympanic membrane is erythematous. A middle ear effusion is present.   Nose: Mucosal edema present. Right sinus exhibits no maxillary sinus tenderness and no frontal sinus tenderness. Left sinus exhibits no maxillary sinus tenderness and no frontal sinus tenderness.   Mouth/Throat: Oropharynx is clear and moist. Oropharyngeal exudate: pnd.   Eyes: Pupils are equal, round, and reactive to light. Conjunctivae are normal.   Cardiovascular:  Normal rate and regular rhythm.    No murmur heard.  Pulmonary/Chest: Effort normal. She has no wheezes. She has no rhonchi. She has no rales.   Musculoskeletal: Normal range of motion.   Lymphadenopathy:     She has cervical adenopathy (shotty).   Neurological: She is alert and oriented to person, place, and time.   Skin: Skin is warm and dry.   Vitals reviewed.      Assessment/Plan   Candi was seen today for fever.    Diagnoses and all orders for this visit:    Acute suppurative otitis media of left ear without spontaneous rupture of tympanic membrane, recurrence not specified  -     amoxicillin (AMOXIL) 875 MG tablet; Take 1 tablet by mouth 2 (Two) Times a Day for 10 days.  -     fluticasone (FLONASE) 50 MCG/ACT nasal spray; 1 spray into the nostril(s) as directed by provider 2 (Two) Times a Day for 14 days.        Patient Instructions   Otitis Media, Adult  Otitis media occurs when there is inflammation and fluid in the middle ear. Your middle ear is a part of the ear that contains bones for hearing as well as air that helps send sounds to your brain.  What are the causes?  This condition is caused by a blockage in the eustachian tube. This tube drains fluid from the ear to the back of the nose (nasopharynx). A blockage in this tube can be caused by an object or by swelling (edema) in the tube. Problems that can cause a blockage include:  · A cold or other upper respiratory infection.  · Allergies.  · An irritant, such as tobacco smoke.  · Enlarged adenoids. The adenoids are areas of soft tissue located high in the back of the throat, behind the nose and the roof of the mouth.  · A mass in the nasopharynx.  · Damage to the ear caused by pressure changes (barotrauma).    What are the signs or symptoms?  Symptoms of this condition include:  · Ear pain.  · A fever.  · Decreased hearing.  · A headache.  · Tiredness (lethargy).  · Fluid leaking from the ear.  · Ringing in the ear.    How is this diagnosed?  This  condition is diagnosed with a physical exam. During the exam your health care provider will use an instrument called an otoscope to look into your ear and check for redness, swelling, and fluid. He or she will also ask about your symptoms.  Your health care provider may also order tests, such as:  · A test to check the movement of the eardrum (pneumatic otoscopy). This test is done by squeezing a small amount of air into the ear.  · A test that changes air pressure in the middle ear to check how well the eardrum moves and whether the eustachian tube is working (tympanogram).    How is this treated?  This condition usually goes away on its own within 3-5 days. But if the condition is caused by a bacteria infection and does not go away own its own, or keeps coming back, your health care provider may:  · Prescribe antibiotic medicines to treat the infection.  · Prescribe or recommend medicines to control pain.    Follow these instructions at home:  · Take over-the-counter and prescription medicines only as told by your health care provider.  · If you were prescribed an antibiotic medicine, take it as told by your health care provider. Do not stop taking the antibiotic even if you start to feel better.  · Keep all follow-up visits as told by your health care provider. This is important.  Contact a health care provider if:  · You have bleeding from your nose.  · There is a lump on your neck.  · You are not getting better in 5 days.  · You feel worse instead of better.  Get help right away if:  · You have severe pain that is not controlled with medicine.  · You have swelling, redness, or pain around your ear.  · You have stiffness in your neck.  · A part of your face is paralyzed.  · The bone behind your ear (mastoid) is tender when you touch it.  · You develop a severe headache.  Summary  · Otitis media is redness, soreness, and swelling of the middle ear.  · This condition usually goes away on its own within 3-5  days.  · If the problem does not go away in 3-5 days, your health care provider may prescribe or recommend medicines to treat your symptoms.  · If you were prescribed an antibiotic medicine, take it as told by your health care provider.  This information is not intended to replace advice given to you by your health care provider. Make sure you discuss any questions you have with your health care provider.  Document Released: 09/22/2005 Document Revised: 12/08/2017 Document Reviewed: 12/08/2017  [employer] Interactive Patient Education © 2018 [employer]        Return if symptoms worsen or fail to improve with urgent care/ER.

## 2018-10-07 NOTE — PATIENT INSTRUCTIONS

## 2019-01-02 DIAGNOSIS — D50.8 OTHER IRON DEFICIENCY ANEMIA: ICD-10-CM

## 2019-01-02 DIAGNOSIS — E55.9 VITAMIN D DEFICIENCY: Primary | ICD-10-CM

## 2019-01-02 DIAGNOSIS — E11.9 DIABETES MELLITUS TYPE 2, NONINSULIN DEPENDENT (HCC): ICD-10-CM

## 2019-01-31 LAB
25(OH)D3+25(OH)D2 SERPL-MCNC: 18.1 NG/ML (ref 30–100)
ALBUMIN SERPL-MCNC: 3.9 G/DL (ref 3.5–5.5)
ALBUMIN/GLOB SERPL: 1.3 {RATIO} (ref 1.2–2.2)
ALP SERPL-CCNC: 61 IU/L (ref 39–117)
ALT SERPL-CCNC: 14 IU/L (ref 0–32)
AST SERPL-CCNC: 15 IU/L (ref 0–40)
BILIRUB SERPL-MCNC: 0.3 MG/DL (ref 0–1.2)
BUN SERPL-MCNC: 8 MG/DL (ref 6–24)
BUN/CREAT SERPL: 11 (ref 9–23)
CALCIUM SERPL-MCNC: 8.8 MG/DL (ref 8.7–10.2)
CHLORIDE SERPL-SCNC: 100 MMOL/L (ref 96–106)
CHOLEST SERPL-MCNC: 152 MG/DL (ref 100–199)
CHOLEST/HDLC SERPL: 2.8 RATIO (ref 0–4.4)
CO2 SERPL-SCNC: 25 MMOL/L (ref 20–29)
CREAT SERPL-MCNC: 0.73 MG/DL (ref 0.57–1)
GLOBULIN SER CALC-MCNC: 2.9 G/DL (ref 1.5–4.5)
GLUCOSE SERPL-MCNC: 241 MG/DL (ref 65–99)
HBA1C MFR BLD: 9 % (ref 4.8–5.6)
HDLC SERPL-MCNC: 54 MG/DL
LDLC SERPL CALC-MCNC: 68 MG/DL (ref 0–99)
POTASSIUM SERPL-SCNC: 4.3 MMOL/L (ref 3.5–5.2)
PROT SERPL-MCNC: 6.8 G/DL (ref 6–8.5)
SODIUM SERPL-SCNC: 139 MMOL/L (ref 134–144)
TRIGL SERPL-MCNC: 151 MG/DL (ref 0–149)
TSH SERPL DL<=0.005 MIU/L-ACNC: 2.31 UIU/ML (ref 0.45–4.5)
VLDLC SERPL CALC-MCNC: 30 MG/DL (ref 5–40)

## 2019-02-06 ENCOUNTER — OFFICE VISIT (OUTPATIENT)
Dept: INTERNAL MEDICINE | Facility: CLINIC | Age: 44
End: 2019-02-06

## 2019-02-06 VITALS
HEIGHT: 62 IN | BODY MASS INDEX: 50.61 KG/M2 | DIASTOLIC BLOOD PRESSURE: 80 MMHG | OXYGEN SATURATION: 97 % | WEIGHT: 275 LBS | TEMPERATURE: 98.1 F | SYSTOLIC BLOOD PRESSURE: 126 MMHG | HEART RATE: 101 BPM

## 2019-02-06 DIAGNOSIS — F41.9 ANXIETY: ICD-10-CM

## 2019-02-06 DIAGNOSIS — E11.9 DIABETES MELLITUS TYPE 2, NONINSULIN DEPENDENT (HCC): Primary | ICD-10-CM

## 2019-02-06 DIAGNOSIS — E66.01 CLASS 3 SEVERE OBESITY WITH SERIOUS COMORBIDITY AND BODY MASS INDEX (BMI) OF 50.0 TO 59.9 IN ADULT, UNSPECIFIED OBESITY TYPE (HCC): ICD-10-CM

## 2019-02-06 DIAGNOSIS — E55.9 VITAMIN D DEFICIENCY: ICD-10-CM

## 2019-02-06 PROCEDURE — 99214 OFFICE O/P EST MOD 30 MIN: CPT | Performed by: NURSE PRACTITIONER

## 2019-02-06 RX ORDER — LOSARTAN POTASSIUM 25 MG/1
25 TABLET ORAL DAILY
Qty: 30 TABLET | Refills: 6 | Status: SHIPPED | OUTPATIENT
Start: 2019-02-06 | End: 2019-03-06

## 2019-02-06 RX ORDER — ERGOCALCIFEROL 1.25 MG/1
50000 CAPSULE ORAL WEEKLY
Qty: 4 CAPSULE | Refills: 1 | Status: SHIPPED | OUTPATIENT
Start: 2019-02-06 | End: 2019-03-28

## 2019-02-06 NOTE — PROGRESS NOTES
Subjective   Candi Del Rio is a 43 y.o. female.     History of Present Illness   The patient is here today to f/u on DM2. She is feeling well. Up 5 lbs from last March. Struggling with her diet.   Has been stressed and this worsens her eating. 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 She was doing yoga and now is off due to life.     DM2- feels like her victoza causes her lymph nodes to swell. She has been off victoza for months. Taking metformin as directed.   Vit D def- not taking  The following portions of the patient's history were reviewed and updated as appropriate: allergies, current medications, past family history, past medical history, past social history, past surgical history and problem list.    Review of Systems   Constitutional: Negative.    Respiratory: Negative.    Cardiovascular: Negative.    Psychiatric/Behavioral: Negative for dysphoric mood and suicidal ideas. The patient is nervous/anxious.        Objective   Physical Exam   Constitutional: She appears well-developed and well-nourished.   Neck: Normal range of motion. Neck supple. No thyromegaly present.   Cardiovascular: Normal rate, regular rhythm, normal heart sounds and intact distal pulses.   Pulmonary/Chest: Effort normal and breath sounds normal.   Skin: Skin is warm and dry.   Psychiatric: She has a normal mood and affect. Her behavior is normal. Judgment and thought content normal.       Assessment/Plan   There are no diagnoses linked to this encounter.    1. DM2- recommend statin, pt defers, written rx for weight watchers, start ARB, change to janumet, if needed will add SGLT2 at next visit  Start checking BG and F/u in 4 weeks to discuss numbers.   2. Vit D def- start Rx and then take Vit D 3 5000 IU daily, recheck in 3 months  3. Obesity- discussed at length, start with weight watchers, get back to gym. Needs to think about wt loss sx, pt defers  4. Anxiety- work on lifestyle changes, if needed can discuss meds at 4 week  follow up    Flu vaccine- defers

## 2019-03-06 ENCOUNTER — OFFICE VISIT (OUTPATIENT)
Dept: INTERNAL MEDICINE | Facility: CLINIC | Age: 44
End: 2019-03-06

## 2019-03-06 VITALS
HEIGHT: 62 IN | TEMPERATURE: 98.7 F | WEIGHT: 274.9 LBS | OXYGEN SATURATION: 98 % | SYSTOLIC BLOOD PRESSURE: 118 MMHG | HEART RATE: 88 BPM | DIASTOLIC BLOOD PRESSURE: 78 MMHG | BODY MASS INDEX: 50.59 KG/M2

## 2019-03-06 DIAGNOSIS — E11.9 DIABETES MELLITUS TYPE 2, NONINSULIN DEPENDENT (HCC): Primary | ICD-10-CM

## 2019-03-06 DIAGNOSIS — E55.9 VITAMIN D DEFICIENCY: ICD-10-CM

## 2019-03-06 PROCEDURE — 99213 OFFICE O/P EST LOW 20 MIN: CPT | Performed by: NURSE PRACTITIONER

## 2019-03-06 NOTE — PROGRESS NOTES
Subjective   Candi Del Rio is a 43 y.o. female.     History of Present Illness   The patient is here today to F/U on DM2. At last visit med changed to janumet. BG reading today is 149. A few weeks ago numbers running 180-200s. Now 120-180s.   She has started working out at "[employer] last month.   Not eating enough calories.     Her anxiety has improved, work is much better.   The following portions of the patient's history were reviewed and updated as appropriate: allergies, current medications, past family history, past medical history, past social history, past surgical history and problem list.    Review of Systems   Constitutional: Negative.    Respiratory: Negative.    Cardiovascular: Negative.    Psychiatric/Behavioral: Negative.        Objective   Physical Exam   Constitutional: She appears well-developed and well-nourished.   Neck: Normal range of motion. Neck supple. No thyromegaly present.   Cardiovascular: Normal rate, regular rhythm, normal heart sounds and intact distal pulses.   Pulmonary/Chest: Effort normal and breath sounds normal.   Skin: Skin is warm and dry.   Psychiatric: She has a normal mood and affect. Her behavior is normal. Judgment and thought content normal.       Assessment/Plan   There are no diagnoses linked to this encounter.    1. DM2- increase intensity of exercise, stop ARB due to med recall, will revisit new ARB at follow up. Discussed dietary recommendations. 3-5 small meals a day, no eating after 7. Increase caloric intake. Wt loss goal of 5 lbs, labs in 6 weeks.   2. Vit D def- now take 5000 IU daily

## 2019-04-06 ENCOUNTER — RESULTS ENCOUNTER (OUTPATIENT)
Dept: INTERNAL MEDICINE | Facility: CLINIC | Age: 44
End: 2019-04-06

## 2019-04-06 DIAGNOSIS — E55.9 VITAMIN D DEFICIENCY: ICD-10-CM

## 2019-04-06 DIAGNOSIS — E11.9 DIABETES MELLITUS TYPE 2, NONINSULIN DEPENDENT (HCC): ICD-10-CM

## 2019-05-09 LAB
25(OH)D3+25(OH)D2 SERPL-MCNC: 23.8 NG/ML (ref 30–100)
ALBUMIN SERPL-MCNC: 3.8 G/DL (ref 3.5–5.2)
ALBUMIN/GLOB SERPL: 1.2 G/DL
ALP SERPL-CCNC: 62 U/L (ref 39–117)
ALT SERPL-CCNC: 13 U/L (ref 1–33)
AST SERPL-CCNC: 11 U/L (ref 1–32)
BILIRUB SERPL-MCNC: 0.2 MG/DL (ref 0.2–1.2)
BUN SERPL-MCNC: 9 MG/DL (ref 6–20)
BUN/CREAT SERPL: 11.8 (ref 7–25)
CALCIUM SERPL-MCNC: 9.8 MG/DL (ref 8.6–10.5)
CHLORIDE SERPL-SCNC: 102 MMOL/L (ref 98–107)
CO2 SERPL-SCNC: 28 MMOL/L (ref 22–29)
CREAT SERPL-MCNC: 0.76 MG/DL (ref 0.57–1)
GLOBULIN SER CALC-MCNC: 3.2 GM/DL
GLUCOSE SERPL-MCNC: 172 MG/DL (ref 65–99)
HBA1C MFR BLD: 7.8 % (ref 4.8–5.6)
POTASSIUM SERPL-SCNC: 4.4 MMOL/L (ref 3.5–5.2)
PROT SERPL-MCNC: 7 G/DL (ref 6–8.5)
SODIUM SERPL-SCNC: 140 MMOL/L (ref 136–145)

## 2019-07-03 RX ORDER — CALCIUM CITRATE/VITAMIN D3 200MG-6.25
TABLET ORAL
Qty: 100 EACH | Refills: 1 | Status: SHIPPED | OUTPATIENT
Start: 2019-07-03 | End: 2020-06-02

## 2019-11-01 DIAGNOSIS — E78.1 HYPERTRIGLYCERIDEMIA: ICD-10-CM

## 2019-11-01 DIAGNOSIS — E66.8 EXTREME OBESITY: ICD-10-CM

## 2019-11-01 DIAGNOSIS — E11.9 DIABETES MELLITUS TYPE 2, NONINSULIN DEPENDENT (HCC): Primary | ICD-10-CM

## 2019-11-02 LAB
ALBUMIN SERPL-MCNC: 4.2 G/DL (ref 3.5–5.2)
ALBUMIN/GLOB SERPL: 1.5 G/DL
ALP SERPL-CCNC: 60 U/L (ref 39–117)
ALT SERPL-CCNC: 18 U/L (ref 1–33)
AST SERPL-CCNC: 13 U/L (ref 1–32)
BASOPHILS # BLD AUTO: 0.01 10*3/MM3 (ref 0–0.2)
BASOPHILS NFR BLD AUTO: 0.2 % (ref 0–1.5)
BILIRUB SERPL-MCNC: 0.2 MG/DL (ref 0.2–1.2)
BUN SERPL-MCNC: 10 MG/DL (ref 6–20)
BUN/CREAT SERPL: 13.7 (ref 7–25)
CALCIUM SERPL-MCNC: 8.9 MG/DL (ref 8.6–10.5)
CHLORIDE SERPL-SCNC: 100 MMOL/L (ref 98–107)
CHOLEST SERPL-MCNC: 180 MG/DL (ref 0–200)
CO2 SERPL-SCNC: 24.7 MMOL/L (ref 22–29)
CREAT SERPL-MCNC: 0.73 MG/DL (ref 0.57–1)
EOSINOPHIL # BLD AUTO: 0.14 10*3/MM3 (ref 0–0.4)
EOSINOPHIL NFR BLD AUTO: 2.2 % (ref 0.3–6.2)
ERYTHROCYTE [DISTWIDTH] IN BLOOD BY AUTOMATED COUNT: 13.1 % (ref 12.3–15.4)
GLOBULIN SER CALC-MCNC: 2.8 GM/DL
GLUCOSE SERPL-MCNC: 162 MG/DL (ref 65–99)
HBA1C MFR BLD: 8.7 % (ref 4.8–5.6)
HCT VFR BLD AUTO: 36.5 % (ref 34–46.6)
HDLC SERPL-MCNC: 50 MG/DL (ref 40–60)
HGB BLD-MCNC: 12.1 G/DL (ref 12–15.9)
IMM GRANULOCYTES # BLD AUTO: 0.03 10*3/MM3 (ref 0–0.05)
IMM GRANULOCYTES NFR BLD AUTO: 0.5 % (ref 0–0.5)
LDLC SERPL CALC-MCNC: 96 MG/DL (ref 0–100)
LDLC/HDLC SERPL: 1.93 {RATIO}
LYMPHOCYTES # BLD AUTO: 2.14 10*3/MM3 (ref 0.7–3.1)
LYMPHOCYTES NFR BLD AUTO: 34.2 % (ref 19.6–45.3)
MCH RBC QN AUTO: 29.6 PG (ref 26.6–33)
MCHC RBC AUTO-ENTMCNC: 33.2 G/DL (ref 31.5–35.7)
MCV RBC AUTO: 89.2 FL (ref 79–97)
MONOCYTES # BLD AUTO: 0.45 10*3/MM3 (ref 0.1–0.9)
MONOCYTES NFR BLD AUTO: 7.2 % (ref 5–12)
NEUTROPHILS # BLD AUTO: 3.48 10*3/MM3 (ref 1.7–7)
NEUTROPHILS NFR BLD AUTO: 55.7 % (ref 42.7–76)
NRBC BLD AUTO-RTO: 0 /100 WBC (ref 0–0.2)
PLATELET # BLD AUTO: 263 10*3/MM3 (ref 140–450)
POTASSIUM SERPL-SCNC: 4.2 MMOL/L (ref 3.5–5.2)
PROT SERPL-MCNC: 7 G/DL (ref 6–8.5)
RBC # BLD AUTO: 4.09 10*6/MM3 (ref 3.77–5.28)
SODIUM SERPL-SCNC: 137 MMOL/L (ref 136–145)
TRIGL SERPL-MCNC: 168 MG/DL (ref 0–150)
VLDLC SERPL CALC-MCNC: 33.6 MG/DL (ref 5–40)
WBC # BLD AUTO: 6.25 10*3/MM3 (ref 3.4–10.8)

## 2019-11-04 ENCOUNTER — OFFICE VISIT (OUTPATIENT)
Dept: INTERNAL MEDICINE | Facility: CLINIC | Age: 44
End: 2019-11-04

## 2019-11-04 VITALS
HEART RATE: 83 BPM | HEIGHT: 62 IN | WEIGHT: 278.2 LBS | OXYGEN SATURATION: 98 % | BODY MASS INDEX: 51.19 KG/M2 | TEMPERATURE: 98.4 F | SYSTOLIC BLOOD PRESSURE: 140 MMHG | DIASTOLIC BLOOD PRESSURE: 80 MMHG

## 2019-11-04 DIAGNOSIS — E11.9 DIABETES MELLITUS TYPE 2, NONINSULIN DEPENDENT (HCC): Primary | ICD-10-CM

## 2019-11-04 DIAGNOSIS — E66.01 CLASS 3 SEVERE OBESITY WITH SERIOUS COMORBIDITY AND BODY MASS INDEX (BMI) OF 50.0 TO 59.9 IN ADULT, UNSPECIFIED OBESITY TYPE (HCC): ICD-10-CM

## 2019-11-04 DIAGNOSIS — I10 ESSENTIAL HYPERTENSION: ICD-10-CM

## 2019-11-04 DIAGNOSIS — Z12.31 VISIT FOR SCREENING MAMMOGRAM: ICD-10-CM

## 2019-11-04 PROCEDURE — 99214 OFFICE O/P EST MOD 30 MIN: CPT | Performed by: NURSE PRACTITIONER

## 2019-11-04 NOTE — PROGRESS NOTES
Subjective   Candi Del Rio is a 44 y.o. female.      History of Present Illness   The patient is here today to F/U on lab work.   DM2- tried victoza, felt like lymph nodes were swollen, taking once daily, BG running 140-200s  The following portions of the patient's history were reviewed and updated as appropriate: allergies, current medications, past family history, past medical history, past social history, past surgical history and problem list.    Review of Systems   Constitutional: Negative for chills and fever.   Respiratory: Negative.    Cardiovascular: Negative.    Psychiatric/Behavioral: Negative for dysphoric mood and suicidal ideas. The patient is not nervous/anxious.        Objective   Physical Exam   Constitutional: She appears well-developed and well-nourished.   Neck: Normal range of motion. Neck supple. No thyromegaly present.   Cardiovascular: Normal rate, regular rhythm, normal heart sounds and intact distal pulses.   Pulmonary/Chest: Effort normal and breath sounds normal.   Lymphadenopathy:     She has no cervical adenopathy.   Skin: Skin is warm and dry.   Psychiatric: She has a normal mood and affect. Her behavior is normal. Judgment and thought content normal.       Assessment/Plan   Candi was seen today for diabetes.    Diagnoses and all orders for this visit:    Diabetes mellitus type 2, noninsulin dependent (CMS/HCC)  -     Semaglutide, 1 MG/DOSE, (OZEMPIC, 1 MG/DOSE,) 2 MG/1.5ML solution pen-injector; Inject 1 mg under the skin into the appropriate area as directed 1 (One) Time Per Week.    Essential hypertension    Class 3 severe obesity with serious comorbidity and body mass index (BMI) of 50.0 to 59.9 in adult, unspecified obesity type (CMS/HCC)    Visit for screening mammogram  -     Mammo Screening Bilateral With CAD               1. DM2- start taking janumet twice daily, read DM education book, start walking, pt would like to start ozempic  Pt defers lipitor for now  2. HTN- start  lisinopril 5 mg daily, re-eval in 2 weeks call for syst <110 >140 or diast>90  3. Obesity- work on wt loss through healthy diet and exercise    Flu vaccine- defers

## 2019-11-06 RX ORDER — LISINOPRIL 5 MG/1
5 TABLET ORAL DAILY
Qty: 30 TABLET | Refills: 1 | Status: SHIPPED | OUTPATIENT
Start: 2019-11-06 | End: 2020-02-03 | Stop reason: SDDI

## 2019-11-18 ENCOUNTER — OFFICE VISIT (OUTPATIENT)
Dept: INTERNAL MEDICINE | Facility: CLINIC | Age: 44
End: 2019-11-18

## 2019-11-18 VITALS
DIASTOLIC BLOOD PRESSURE: 78 MMHG | HEIGHT: 62 IN | TEMPERATURE: 98.4 F | WEIGHT: 272.1 LBS | SYSTOLIC BLOOD PRESSURE: 118 MMHG | BODY MASS INDEX: 50.07 KG/M2 | OXYGEN SATURATION: 98 % | HEART RATE: 88 BPM

## 2019-11-18 DIAGNOSIS — I10 ESSENTIAL HYPERTENSION: Primary | ICD-10-CM

## 2019-11-18 DIAGNOSIS — E11.9 DIABETES MELLITUS TYPE 2, NONINSULIN DEPENDENT (HCC): ICD-10-CM

## 2019-11-18 PROCEDURE — 99213 OFFICE O/P EST LOW 20 MIN: CPT | Performed by: NURSE PRACTITIONER

## 2019-11-18 NOTE — PROGRESS NOTES
Subjective   Candi Del Rio is a 44 y.o. female.      History of Present Illness   The patient is here today to F/U on HTN. At last visit lisinopril 5 mg daily started. No dizziness, weakness or headaches.  Down 6 lbs.     DM2- taking ozempic 0.5, has some nausea with this  The following portions of the patient's history were reviewed and updated as appropriate: allergies, current medications, past family history, past medical history, past social history, past surgical history and problem list.    Review of Systems   Constitutional: Negative for chills and fever.   Respiratory: Negative.    Cardiovascular: Negative.    Psychiatric/Behavioral: Negative for dysphoric mood and suicidal ideas. The patient is not nervous/anxious.        Objective   Physical Exam   Constitutional: She appears well-developed and well-nourished.   Neck: Normal range of motion. Neck supple. No thyromegaly present.   Cardiovascular: Normal rate, regular rhythm, normal heart sounds and intact distal pulses.   Pulmonary/Chest: Effort normal and breath sounds normal.   Lymphadenopathy:     She has no cervical adenopathy.   Skin: Skin is warm and dry.   Psychiatric: She has a normal mood and affect. Her behavior is normal. Judgment and thought content normal.       Vitals:    11/18/19 0912   BP: 118/78   Pulse: 88   Temp: 98.4 °F (36.9 °C)   SpO2: 98%     Body mass index is 49.75 kg/m².      Assessment/Plan   Cnadi was seen today for hypertension.    Diagnoses and all orders for this visit:    Essential hypertension    Diabetes mellitus type 2, noninsulin dependent (CMS/HCC)               1. HTN- well controlled, call for hypotensive symptoms  2. DM2- continue ozempic, decrease to 0.25 mg once weekly, sample given

## 2019-11-21 ENCOUNTER — APPOINTMENT (OUTPATIENT)
Dept: MAMMOGRAPHY | Facility: HOSPITAL | Age: 44
End: 2019-11-21

## 2019-11-27 ENCOUNTER — HOSPITAL ENCOUNTER (OUTPATIENT)
Dept: MAMMOGRAPHY | Facility: HOSPITAL | Age: 44
Discharge: HOME | End: 2019-11-27
Admitting: NURSE PRACTITIONER

## 2019-11-27 PROCEDURE — 77063 BREAST TOMOSYNTHESIS BI: CPT

## 2019-11-27 PROCEDURE — 77067 SCR MAMMO BI INCL CAD: CPT

## 2020-01-20 ENCOUNTER — TELEPHONE (OUTPATIENT)
Dept: OBSTETRICS AND GYNECOLOGY | Facility: CLINIC | Age: 45
End: 2020-01-20

## 2020-01-28 DIAGNOSIS — E11.9 DIABETES MELLITUS TYPE 2, NONINSULIN DEPENDENT (HCC): ICD-10-CM

## 2020-01-28 DIAGNOSIS — I10 ESSENTIAL HYPERTENSION: ICD-10-CM

## 2020-01-28 DIAGNOSIS — E66.01 CLASS 3 SEVERE OBESITY WITH SERIOUS COMORBIDITY AND BODY MASS INDEX (BMI) OF 50.0 TO 59.9 IN ADULT, UNSPECIFIED OBESITY TYPE (HCC): ICD-10-CM

## 2020-02-03 ENCOUNTER — OFFICE VISIT (OUTPATIENT)
Dept: OBSTETRICS AND GYNECOLOGY | Facility: CLINIC | Age: 45
End: 2020-02-03

## 2020-02-03 VITALS
HEIGHT: 62 IN | BODY MASS INDEX: 50.57 KG/M2 | SYSTOLIC BLOOD PRESSURE: 134 MMHG | DIASTOLIC BLOOD PRESSURE: 80 MMHG | WEIGHT: 274.8 LBS

## 2020-02-03 DIAGNOSIS — N92.1 MENOMETRORRHAGIA: ICD-10-CM

## 2020-02-03 DIAGNOSIS — Z01.419 PAP SMEAR, LOW-RISK: Primary | ICD-10-CM

## 2020-02-03 DIAGNOSIS — Z11.3 SCREEN FOR STD (SEXUALLY TRANSMITTED DISEASE): ICD-10-CM

## 2020-02-03 DIAGNOSIS — Z01.419 ROUTINE GYNECOLOGICAL EXAMINATION: ICD-10-CM

## 2020-02-03 DIAGNOSIS — Z13.9 SCREENING FOR CONDITION: ICD-10-CM

## 2020-02-03 DIAGNOSIS — D21.9 FIBROID: ICD-10-CM

## 2020-02-03 LAB
B-HCG UR QL: NEGATIVE
BILIRUB BLD-MCNC: NEGATIVE MG/DL
CLARITY, POC: CLEAR
COLOR UR: YELLOW
GLUCOSE UR STRIP-MCNC: ABNORMAL MG/DL
INTERNAL NEGATIVE CONTROL: NEGATIVE
INTERNAL POSITIVE CONTROL: POSITIVE
KETONES UR QL: NEGATIVE
LEUKOCYTE EST, POC: NEGATIVE
Lab: 55
NITRITE UR-MCNC: NEGATIVE MG/ML
PH UR: 5 [PH] (ref 5–8)
PROT UR STRIP-MCNC: ABNORMAL MG/DL
RBC # UR STRIP: NEGATIVE /UL
SP GR UR: 1.03 (ref 1–1.03)
UROBILINOGEN UR QL: NORMAL

## 2020-02-03 PROCEDURE — 81002 URINALYSIS NONAUTO W/O SCOPE: CPT | Performed by: OBSTETRICS & GYNECOLOGY

## 2020-02-03 PROCEDURE — 81025 URINE PREGNANCY TEST: CPT | Performed by: OBSTETRICS & GYNECOLOGY

## 2020-02-03 PROCEDURE — 99386 PREV VISIT NEW AGE 40-64: CPT | Performed by: OBSTETRICS & GYNECOLOGY

## 2020-02-03 PROCEDURE — 99213 OFFICE O/P EST LOW 20 MIN: CPT | Performed by: OBSTETRICS & GYNECOLOGY

## 2020-02-03 NOTE — PROGRESS NOTES
GYN Annual Exam     CC- Here for annual exam.     Candi Del Rio is a 44 y.o. female previous patient not seen in the last three years who presents for annual well woman exam.  She has a known history of a fibroid uterus.  She was last seen in 2016 and her fibroid is approximately 2.6 cm at that time.  She has a period every month but they are variable in length and severity.  If she has a period that is heavy and painful she bleeds for a week.  If she has a period that is light she may bleed for 2 weeks.  Given her weight, she is an increased risk of uterine hyperplasia.  We discussed that ultrasound and possible endometrial biopsy is in order given her menstrual findings.  She did have an ablation in . She has regular labs including a CBC and TSH later this week with her primary MD    OB History        2    Para   2    Term   2            AB        Living   2       SAB        TAB        Ectopic        Molar        Multiple        Live Births              Obstetric Comments   2 C/S             Menarche: 15  Current contraception: tubal ligation  History of abnormal Pap smear: no  History of abnormal mammogram: no  Family history of uterine, colon or ovarian cancer: yes - dad colon age 70  Family history of breast cancer: no  H/o STDs: none  Last pap:2016  Gardasil:missed  CHELA: none   Fibroid 2.6 cm in 2016  Endometrial ablation in     Health Maintenance   Topic Date Due   • Annual Gynecologic Pelvic and Breast Exam  1975   • URINE MICROALBUMIN  1975   • ANNUAL PHYSICAL  1978   • TDAP/TD VACCINES (1 - Tdap) 1986   • PNEUMOCOCCAL VACCINE (19-64 MEDIUM RISK) (1 of 1 - PPSV23) 1994   • COLONOSCOPY  2016   • DIABETIC FOOT EXAM  2019   • PAP SMEAR  2019   • DIABETIC EYE EXAM  2019   • HEMOGLOBIN A1C  2020   • MAMMOGRAM  2020   • INFLUENZA VACCINE  Addressed       Past Medical History:   Diagnosis Date   • Anemia     Iron  deficiency   • Diabetes mellitus (CMS/HCC)    • Fibroid    • History of transfusion    • Vitamin D deficiency disease        Past Surgical History:   Procedure Laterality Date   •  SECTION      x2   • CHOLECYSTECTOMY     • COLONOSCOPY N/A 2016    Procedure: COLONOSCOPY w/ polypectomy;  Surgeon: Darlene Vargas MD;  Location: [facility];  Service:    • ENDOMETRIAL ABLATION     • LAPAROSCOPIC CHOLECYSTECTOMY     • TUBAL ABDOMINAL LIGATION           Current Outpatient Medications:   •  Cholecalciferol (VITAMIN D3) 2000 UNITS tablet, Take 1 tablet by mouth Daily., Disp: , Rfl:   •  Insulin Pen Needle 32G X 6 MM misc, 1 each Daily., Disp: 100 each, Rfl: 2  •  montelukast (SINGULAIR) 10 MG tablet, TAKE ONE TABLET BY MOUTH ONCE NIGHTLY, Disp: 30 tablet, Rfl: 4  •  NOVOTWIST 32G X 5 MM misc, , Disp: , Rfl:   •  Semaglutide, 1 MG/DOSE, (OZEMPIC, 1 MG/DOSE,) 2 MG/1.5ML solution pen-injector, Inject 1 mg under the skin into the appropriate area as directed 1 (One) Time Per Week., Disp: 2 pen, Rfl: 4  •  sitaGLIPtin-metFORMIN (JANUMET)  MG per tablet, Take 1 tablet by mouth 2 (Two) Times a Day With Meals., Disp: 60 tablet, Rfl: 6  •  TRUE METRIX BLOOD GLUCOSE TEST test strip, TEST DAILY, Disp: 100 each, Rfl: 1  •  TRUEPLUS LANCETS 30G misc, 1 each Daily., Disp: 100 each, Rfl: 12  •  vitamin B-12 (CYANOCOBALAMIN) 1000 MCG tablet, Place 1 tablet under the tongue daily., Disp: , Rfl:     No Known Allergies    Social History     Tobacco Use   • Smoking status: Never Smoker   • Smokeless tobacco: Never Used   Substance Use Topics   • Alcohol use: No   • Drug use: No       Family History   Problem Relation Age of Onset   • Diabetes Mother    • Hypertension Mother    • Colon cancer Father 70   • Diabetes Father    • Hypertension Father    • Colon polyps Father    • Prostate cancer Maternal Grandfather    • Diabetes Sister    • Diabetes Brother    • Diabetes Brother    • Breast cancer Neg Hx    • Ovarian cancer  "Neg Hx    • Uterine cancer Neg Hx    • Deep vein thrombosis Neg Hx    • Pulmonary embolism Neg Hx        Review of Systems   Constitutional: Negative for appetite change, fatigue, fever and unexpected weight change.   Eyes: Negative for photophobia and visual disturbance.   Respiratory: Negative for cough and shortness of breath.    Cardiovascular: Negative for chest pain and palpitations.   Gastrointestinal: Negative for abdominal distention, abdominal pain, constipation, diarrhea and nausea.   Endocrine: Negative for cold intolerance and heat intolerance.   Genitourinary: Positive for menstrual problem. Negative for dyspareunia, dysuria, pelvic pain and vaginal discharge.   Musculoskeletal: Negative for back pain.   Skin: Negative for color change and rash.   Neurological: Negative for headaches.   Hematological: Negative for adenopathy. Does not bruise/bleed easily.   Psychiatric/Behavioral: Negative for dysphoric mood. The patient is not nervous/anxious.        /80   Ht 157.5 cm (62.01\")   Wt 125 kg (274 lb 12.8 oz)   LMP 01/19/2020 (Approximate)   Breastfeeding No   BMI 50.25 kg/m²     Physical Exam   Constitutional: She is oriented to person, place, and time. She appears well-developed and well-nourished.   HENT:   Head: Normocephalic and atraumatic.   Eyes: Conjunctivae are normal. No scleral icterus.   Neck: Neck supple. No thyromegaly present.   Cardiovascular: Normal rate and regular rhythm.   Pulmonary/Chest: Effort normal and breath sounds normal. Right breast exhibits no inverted nipple, no mass, no nipple discharge, no skin change and no tenderness. Left breast exhibits no inverted nipple, no mass, no nipple discharge, no skin change and no tenderness.   Abdominal: Soft. Bowel sounds are normal. She exhibits no distension and no mass. There is no tenderness. There is no rebound and no guarding. No hernia.   Genitourinary: Pelvic exam was performed with patient supine. There is no rash, " tenderness or lesion on the right labia. There is no rash, tenderness or lesion on the left labia. Uterus is enlarged. Uterus is not deviated, not fixed and not tender. Cervix exhibits no motion tenderness, no discharge and no friability. Right adnexum displays no mass, no tenderness and no fullness. Left adnexum displays no mass, no tenderness and no fullness. No erythema, tenderness or bleeding in the vagina. No foreign body in the vagina. No signs of injury around the vagina. No vaginal discharge found.   Genitourinary Comments: Exam limited due to habitus   Neurological: She is alert and oriented to person, place, and time.   Skin: Skin is warm and dry.   Psychiatric: She has a normal mood and affect. Her behavior is normal. Judgment and thought content normal.   Nursing note and vitals reviewed.         Assessment/Plan    1) GYN HM: pap/HPV/C/G/T  SBE demonstrated and encouraged.  2) STD screening: accepts Condoms encouraged.  3) Contraception: tubal ligation  4) Family Planning: family planning: childbearing completed, encourage folic acid daily  5) Diet and Exercise discussed  6) Smoking Status: No  7) Menometrorrhagia- has CBC and TSH scheduled. Schedule TVUS and endo bx in the next 4 weeks.   8) MMG- UTD 11/2019- B1  9) C scope UTD 12/2016- repeat 5 years  10) RHM- will draw labs that Ms. Olivia TODDLESLEY ordered so she does not hae to have 2 lab draws this week.          Candi was seen today for gynecologic exam.    Diagnoses and all orders for this visit:    Pap smear, low-risk  -     Cancel: Pap IG, HPV-hr  -     PapIG, CtNgTv, HPV, Rfx 16 / 18    Screening for condition  -     POC Urinalysis Dipstick  -     POC Pregnancy, Urine    Routine gynecological examination  -     Cancel: Pap IG, HPV-hr  -     PapIG, CtNgTv, HPV, Rfx 16 / 18  -     Comprehensive Metabolic Panel  -     Lipid Panel  -     TSH Rfx On Abnormal To Free T4  -     Hemoglobin A1c    Screen for STD (sexually transmitted disease)  -     PapIG,  CtNgTv, HPV, Rfx 16 / 18  -     Hepatitis B Surface Antigen  -     Hepatitis C Antibody  -     HIV-1 / O / 2 Ag / Antibody 4th Generation  -     HSV 1 & 2 - Specific Antibody, IgG  -     RPR, Rfx Qn RPR / Confirm TP    Fibroid    Menometrorrhagia          Shae Gage MD  02/03/2020    11:22 AM

## 2020-02-04 LAB
ALBUMIN SERPL-MCNC: 3.9 G/DL (ref 3.8–4.8)
ALBUMIN/GLOB SERPL: 1.3 {RATIO} (ref 1.2–2.2)
ALP SERPL-CCNC: 53 IU/L (ref 39–117)
ALT SERPL-CCNC: 10 IU/L (ref 0–32)
AST SERPL-CCNC: 12 IU/L (ref 0–40)
BILIRUB SERPL-MCNC: <0.2 MG/DL (ref 0–1.2)
BUN SERPL-MCNC: 9 MG/DL (ref 6–24)
BUN/CREAT SERPL: 14 (ref 9–23)
CALCIUM SERPL-MCNC: 9.1 MG/DL (ref 8.7–10.2)
CHLORIDE SERPL-SCNC: 104 MMOL/L (ref 96–106)
CHOLEST SERPL-MCNC: 130 MG/DL (ref 100–199)
CO2 SERPL-SCNC: 21 MMOL/L (ref 20–29)
CREAT SERPL-MCNC: 0.64 MG/DL (ref 0.57–1)
GLOBULIN SER CALC-MCNC: 2.9 G/DL (ref 1.5–4.5)
GLUCOSE SERPL-MCNC: 131 MG/DL (ref 65–99)
HBA1C MFR BLD: 7.3 % (ref 4.8–5.6)
HBV SURFACE AG SERPL QL IA: NEGATIVE
HCV AB S/CO SERPL IA: <0.1 S/CO RATIO (ref 0–0.9)
HDLC SERPL-MCNC: 48 MG/DL
HIV 1+2 AB+HIV1 P24 AG SERPL QL IA: NON REACTIVE
HSV1 IGG SER IA-ACNC: 21.4 INDEX (ref 0–0.9)
HSV2 IGG SER IA-ACNC: <0.91 INDEX (ref 0–0.9)
LDLC SERPL CALC-MCNC: 63 MG/DL (ref 0–99)
POTASSIUM SERPL-SCNC: 4.5 MMOL/L (ref 3.5–5.2)
PROT SERPL-MCNC: 6.8 G/DL (ref 6–8.5)
RPR SER QL: NON REACTIVE
SODIUM SERPL-SCNC: 139 MMOL/L (ref 134–144)
TRIGL SERPL-MCNC: 95 MG/DL (ref 0–149)
TSH SERPL DL<=0.005 MIU/L-ACNC: 2.35 UIU/ML (ref 0.45–4.5)
VLDLC SERPL CALC-MCNC: 19 MG/DL (ref 5–40)

## 2020-02-04 NOTE — PROGRESS NOTES
PIP= blood portion of STD panel shows previous exposure to the cold sore virus.  Her remaining labs will be reviewed with KODAK Epperson.

## 2020-02-05 ENCOUNTER — OFFICE VISIT (OUTPATIENT)
Dept: INTERNAL MEDICINE | Facility: CLINIC | Age: 45
End: 2020-02-05

## 2020-02-05 VITALS
HEIGHT: 62 IN | DIASTOLIC BLOOD PRESSURE: 72 MMHG | BODY MASS INDEX: 49.69 KG/M2 | WEIGHT: 270 LBS | SYSTOLIC BLOOD PRESSURE: 120 MMHG | TEMPERATURE: 97.4 F | OXYGEN SATURATION: 99 % | HEART RATE: 86 BPM

## 2020-02-05 DIAGNOSIS — Z00.00 HEALTH CARE MAINTENANCE: Primary | ICD-10-CM

## 2020-02-05 DIAGNOSIS — R06.83 SNORING: ICD-10-CM

## 2020-02-05 DIAGNOSIS — E11.9 DIABETES MELLITUS TYPE 2, NONINSULIN DEPENDENT (HCC): ICD-10-CM

## 2020-02-05 DIAGNOSIS — R23.8 PAPULES: ICD-10-CM

## 2020-02-05 DIAGNOSIS — E66.01 CLASS 3 SEVERE OBESITY WITH SERIOUS COMORBIDITY AND BODY MASS INDEX (BMI) OF 50.0 TO 59.9 IN ADULT, UNSPECIFIED OBESITY TYPE (HCC): ICD-10-CM

## 2020-02-05 PROBLEM — H40.9 GLAUCOMA: Status: ACTIVE | Noted: 2020-02-05

## 2020-02-05 LAB
C TRACH RRNA CVX QL NAA+PROBE: NEGATIVE
CYTOLOGIST CVX/VAG CYTO: NORMAL
CYTOLOGY CVX/VAG DOC CYTO: NORMAL
CYTOLOGY CVX/VAG DOC THIN PREP: NORMAL
DX ICD CODE: NORMAL
HIV 1 & 2 AB SER-IMP: NORMAL
HPV I/H RISK 1 DNA CVX QL PROBE+SIG AMP: NEGATIVE
N GONORRHOEA RRNA CVX QL NAA+PROBE: NEGATIVE
OTHER STN SPEC: NORMAL
STAT OF ADQ CVX/VAG CYTO-IMP: NORMAL
T VAGINALIS RRNA SPEC QL NAA+PROBE: NEGATIVE

## 2020-02-05 PROCEDURE — 99396 PREV VISIT EST AGE 40-64: CPT | Performed by: NURSE PRACTITIONER

## 2020-02-05 NOTE — PROGRESS NOTES
"Subjective   Candi Del Rio is a 44 y.o. female.     History of Present Illness   The patient is here today for CPE and lab work F/U. She is feeling well. Down 2 more lbs. Not exercising yet. She is doing a 17 day diet. Focusing on fruits veggies and yogurt.   DM2- doing well with 1 mg ozempic. Pt is doing well with current medication regimen, denies adverse reactions, compliant with medication schedule.       To have US and bx as pt reports her fibroids are larger.     Snoring- \"everyone in my family has sleep apnea, I know I have it.\"    \"I have a lot of moles.\"    Sister Etta Ya     The following portions of the patient's history were reviewed and updated as appropriate: allergies, current medications, past family history, past medical history, past social history, past surgical history and problem list.    Review of Systems   Constitutional: Negative for chills, fatigue and fever.   HENT: Negative for ear pain, rhinorrhea and sore throat.    Eyes: Negative.    Respiratory: Negative for cough and shortness of breath.    Cardiovascular: Negative.    Gastrointestinal: Negative.    Endocrine: Negative for cold intolerance and heat intolerance.   Genitourinary: Negative for breast discharge, breast lump, breast pain, difficulty urinating, dyspareunia, dysuria, flank pain, frequency, genital sores, hematuria, pelvic pain and urgency.   Musculoskeletal: Negative.    Skin: Negative.    Allergic/Immunologic: Negative for environmental allergies and food allergies.   Neurological: Negative.    Hematological: Negative.    Psychiatric/Behavioral: Negative for dysphoric mood and suicidal ideas. The patient is not nervous/anxious.        Objective   Physical Exam   Constitutional: She is oriented to person, place, and time. Vital signs are normal. She appears well-developed and well-nourished. She is cooperative.   Body mass index is 49.37 kg/m².     HENT:   Right Ear: Hearing, tympanic membrane, external ear and ear " canal normal.   Left Ear: Hearing, tympanic membrane, external ear and ear canal normal.   Nose: Nose normal.   Mouth/Throat: Uvula is midline, oropharynx is clear and moist and mucous membranes are normal.   Eyes: Pupils are equal, round, and reactive to light. Conjunctivae, EOM and lids are normal.   Neck: Trachea normal and normal range of motion. Carotid bruit is not present. No thyroid mass and no thyromegaly present.   Cardiovascular: Normal rate, regular rhythm, normal heart sounds and normal pulses.   Pulmonary/Chest: Effort normal and breath sounds normal.   Abdominal: Soft. Normal appearance, normal aorta and bowel sounds are normal. There is no hepatosplenomegaly. There is no tenderness.   Musculoskeletal: Normal range of motion.    Candi had a diabetic foot exam performed today.   During the foot exam she had a monofilament test performed.    Neurological Sensory Findings -  No right ankle/foot altered proprioception and no left ankle/foot altered proprioception  Vascular Status -  Her right foot exhibits normal foot vasculature  and no edema. Her left foot exhibits normal foot vasculature  and no edema.  Skin Integrity  -  Her right foot skin is intact.Her left foot skin is intact..  Lymphadenopathy:     She has no cervical adenopathy.     She has no axillary adenopathy. No inguinal adenopathy noted on the right or left side.        Right: No inguinal and no supraclavicular adenopathy present.        Left: No inguinal and no supraclavicular adenopathy present.   Neurological: She is alert and oriented to person, place, and time. She has normal strength. No cranial nerve deficit or sensory deficit. She displays a negative Romberg sign. GCS eye subscore is 4. GCS verbal subscore is 5. GCS motor subscore is 6.   Reflex Scores:       Patellar reflexes are 2+ on the right side and 2+ on the left side.  Skin: Skin is warm, dry and intact.   Psychiatric: She has a normal mood and affect. Her speech is normal  and behavior is normal. Judgment and thought content normal. Cognition and memory are normal.       Vitals:    02/05/20 1439   BP: 120/72   Pulse: 86   Temp: 97.4 °F (36.3 °C)   SpO2: 99%     Body mass index is 49.37 kg/m².      Assessment/Plan   Candi was seen today for annual exam.    Diagnoses and all orders for this visit:    Health care maintenance    Diabetes mellitus type 2, noninsulin dependent (CMS/HCC)  -     metFORMIN (GLUCOPHAGE) 1000 MG tablet; Take 1 tablet by mouth 2 (Two) Times a Day With Meals.    Class 3 severe obesity with serious comorbidity and body mass index (BMI) of 50.0 to 59.9 in adult, unspecified obesity type (CMS/HCC)                 1. Preventative counseling- continue with healthy diet and add in exercise  2. DM2/obesity- pt defers statin, much improved with increase of ozempic, ok to stop januvia and continue metformin, goal wt loss of 10-15 lbs.   Pt reports UTD with eye specialist, due in March.   EKG with physical next year.   3. Snoring- see sleep spec  4. Mult moles- ok to see derm

## 2020-03-02 ENCOUNTER — OFFICE VISIT (OUTPATIENT)
Dept: OBSTETRICS AND GYNECOLOGY | Facility: CLINIC | Age: 45
End: 2020-03-02

## 2020-03-02 ENCOUNTER — PROCEDURE VISIT (OUTPATIENT)
Dept: OBSTETRICS AND GYNECOLOGY | Facility: CLINIC | Age: 45
End: 2020-03-02

## 2020-03-02 VITALS
SYSTOLIC BLOOD PRESSURE: 122 MMHG | HEIGHT: 62 IN | BODY MASS INDEX: 48.58 KG/M2 | DIASTOLIC BLOOD PRESSURE: 76 MMHG | WEIGHT: 264 LBS

## 2020-03-02 DIAGNOSIS — N92.1 MENOMETRORRHAGIA: Primary | ICD-10-CM

## 2020-03-02 DIAGNOSIS — D21.9 FIBROID: ICD-10-CM

## 2020-03-02 DIAGNOSIS — Z13.9 SCREENING FOR CONDITION: ICD-10-CM

## 2020-03-02 DIAGNOSIS — N85.2 ENLARGED UTERUS: Primary | ICD-10-CM

## 2020-03-02 LAB
B-HCG UR QL: NEGATIVE
INTERNAL NEGATIVE CONTROL: NEGATIVE
INTERNAL POSITIVE CONTROL: POSITIVE
Lab: NORMAL

## 2020-03-02 PROCEDURE — 76830 TRANSVAGINAL US NON-OB: CPT | Performed by: OBSTETRICS & GYNECOLOGY

## 2020-03-02 PROCEDURE — 99213 OFFICE O/P EST LOW 20 MIN: CPT | Performed by: OBSTETRICS & GYNECOLOGY

## 2020-03-02 PROCEDURE — 81025 URINE PREGNANCY TEST: CPT | Performed by: OBSTETRICS & GYNECOLOGY

## 2020-03-02 PROCEDURE — 58100 BIOPSY OF UTERUS LINING: CPT | Performed by: OBSTETRICS & GYNECOLOGY

## 2020-03-02 RX ORDER — ACETAMINOPHEN AND CODEINE PHOSPHATE 120; 12 MG/5ML; MG/5ML
1 SOLUTION ORAL DAILY
Qty: 84 TABLET | Refills: 1 | Status: SHIPPED | OUTPATIENT
Start: 2020-03-02 | End: 2020-10-15

## 2020-03-02 NOTE — PROGRESS NOTES
Candi Del Rio is a 44 y.o. patient who presents for follow up of   Chief Complaint   Patient presents with   • Procedure     endo bx     44-year-old established patient here for ultrasound and endometrial biopsy.  She has a known history of a fibroid uterus.  She has a period every month but they are variable in length and severity.  If she has a period that is heavy and painful she bleeds for a week.  She has a period that is light she may bleed for 2 weeks.  Her ultrasound today shows a 9.5 cm retroverted uterus with an endometrial lining of 1 cm.  She has anterior fibroids measure 2.1 x 2 cm, 1.8 x 1.7 cm.  She has a fundal fibroid that measures 2 x 1.9 cm and a posterior wall fibroid that measures 1.7 x 1.7 cm.  Her ovaries appear to be normal.  There is no comparable data.  She has a tubal ligation for birth control and did have an ablation in 2006.  Discussed that since she has had an ablation, repeat ablation is not an option.  We did discuss possible D&C as well as hysterectomy.  At this point, she does not feel that her bleeding warrants any surgical intervention.  She would be a good candidate for progesterone only pills.        The following portions of the patient's history were reviewed and updated as appropriate: allergies, current medications and problem list.    Review of Systems   Constitutional: Negative for appetite change, fatigue, fever and unexpected weight change.   Eyes: Negative for photophobia and visual disturbance.   Respiratory: Negative for cough and shortness of breath.    Cardiovascular: Negative for chest pain and palpitations.   Gastrointestinal: Negative for abdominal distention, abdominal pain, constipation, diarrhea and nausea.   Endocrine: Negative for cold intolerance and heat intolerance.   Genitourinary: Positive for menstrual problem. Negative for dyspareunia, dysuria, pelvic pain and vaginal discharge.   Musculoskeletal: Negative for back pain.   Skin: Negative for  "color change and rash.   Neurological: Negative for headaches.   Hematological: Negative for adenopathy. Does not bruise/bleed easily.   Psychiatric/Behavioral: Negative for dysphoric mood. The patient is not nervous/anxious.        /76   Ht 157.5 cm (62.01\")   Wt 120 kg (264 lb)   LMP 02/17/2020   BMI 48.27 kg/m²     Physical Exam   Constitutional: She is oriented to person, place, and time. She appears well-developed and well-nourished.   HENT:   Head: Normocephalic and atraumatic.   Eyes: Conjunctivae are normal. No scleral icterus.   Abdominal: Soft. She exhibits no distension and no mass. There is no tenderness. There is no rebound and no guarding. No hernia.   Genitourinary: Uterus normal. Pelvic exam was performed with patient supine. There is no rash, tenderness, lesion or injury on the right labia. There is no rash, tenderness, lesion or injury on the left labia. Cervix exhibits no motion tenderness, no discharge and no friability. Right adnexum displays no mass, no tenderness and no fullness. Left adnexum displays no mass, no tenderness and no fullness. No erythema, tenderness or bleeding in the vagina. No foreign body in the vagina. No signs of injury around the vagina. No vaginal discharge found.   Genitourinary Comments: See Endo biopsy note   Neurological: She is alert and oriented to person, place, and time.   Skin: Skin is warm and dry.   Psychiatric: She has a normal mood and affect. Her behavior is normal. Judgment and thought content normal.   Nursing note and vitals reviewed.    PROCEDURE NOTE    Procedures      Diagnosis  Menometrorrhagia       Patient's last menstrual period was 02/17/2020.         UCG: negative  Menopausal No   HRT  negative   Current contraception: tubal ligation    Applying Universal Precautions I properly identified the patient and sought her signature with informed consent.  The reason for the biopsy was discussed.  Using a betadine prep on the cervix the cervix " was grasped with a tenaculum and the uterus sounded to 5 cm.  A disposable Pipelle was used to retrieve the specimen by passing it 5 times into the cavity hoping to sample more than one area.     Additional procedures necessary were not necessary  The specimen was labelled and placed in a formalin container.  Tissue was inadequate despite good sampling, YASMEEN was accessible but higher up in the uterine cavity was not, likely due to ablation  The patient tolerated the procedure    Instructions were given on vaginal rest, OTC tylenol, Motrin or Aleve, and expected future bleeding.  Resulting and follow up were discussed.          A/P:  1.  Menomenorrhagia- status post limited endometrial biopsy today.  Will start progesterone only pills.  We discussed the DB side effects (spotting) from POPs.   2. Fibroids-fibroids are fairly stable in size, however, there are multiple new small fibroids seen.  We will continue to follow these every 1 to 2 years with ultrasound for growth.  3. RHM- UTD 11/2019 MMG and 2/2020 pap/HPV neg.   4. RTO 4 months recheck CBC and VB patterns.     Assessment/Plan   Candi was seen today for procedure.    Diagnoses and all orders for this visit:    Menometrorrhagia  -     Reference Histopathology    Screening for condition  -     POC Pregnancy, Urine    Fibroid    Other orders  -     norethindrone (MICRONOR) 0.35 MG tablet; Take 1 tablet by mouth Daily.                 No follow-ups on file.      Shae Gage MD    3/2/2020  9:07 AM

## 2020-03-04 LAB
DX ICD CODE: NORMAL
PATH REPORT.FINAL DX SPEC: NORMAL
PATH REPORT.GROSS SPEC: NORMAL
PATH REPORT.SITE OF ORIGIN SPEC: NORMAL
PATHOLOGIST NAME: NORMAL
PAYMENT PROCEDURE: NORMAL

## 2020-03-05 ENCOUNTER — APPOINTMENT (OUTPATIENT)
Dept: SLEEP MEDICINE | Facility: HOSPITAL | Age: 45
End: 2020-03-05

## 2020-05-05 ENCOUNTER — RESULTS ENCOUNTER (OUTPATIENT)
Dept: INTERNAL MEDICINE | Facility: CLINIC | Age: 45
End: 2020-05-05

## 2020-05-05 DIAGNOSIS — Z00.00 HEALTH CARE MAINTENANCE: ICD-10-CM

## 2020-05-05 DIAGNOSIS — E66.01 CLASS 3 SEVERE OBESITY WITH SERIOUS COMORBIDITY AND BODY MASS INDEX (BMI) OF 50.0 TO 59.9 IN ADULT, UNSPECIFIED OBESITY TYPE (HCC): ICD-10-CM

## 2020-05-05 DIAGNOSIS — E11.9 DIABETES MELLITUS TYPE 2, NONINSULIN DEPENDENT (HCC): ICD-10-CM

## 2020-06-02 RX ORDER — CALCIUM CITRATE/VITAMIN D3 200MG-6.25
1 TABLET ORAL 2 TIMES DAILY
Qty: 100 EACH | Refills: 0 | Status: SHIPPED | OUTPATIENT
Start: 2020-06-02 | End: 2020-10-08 | Stop reason: SDUPTHER

## 2020-06-02 RX ORDER — CALCIUM CITRATE/VITAMIN D3 200MG-6.25
1 TABLET ORAL 2 TIMES DAILY
Qty: 100 EACH | Refills: 0 | Status: SHIPPED | OUTPATIENT
Start: 2020-06-02 | End: 2020-06-02 | Stop reason: SDUPTHER

## 2020-07-16 ENCOUNTER — OFFICE VISIT (OUTPATIENT)
Dept: OBSTETRICS AND GYNECOLOGY | Facility: CLINIC | Age: 45
End: 2020-07-16

## 2020-07-16 VITALS
DIASTOLIC BLOOD PRESSURE: 70 MMHG | BODY MASS INDEX: 48.58 KG/M2 | WEIGHT: 264 LBS | SYSTOLIC BLOOD PRESSURE: 124 MMHG | HEIGHT: 62 IN

## 2020-07-16 DIAGNOSIS — N92.1 MENOMETRORRHAGIA: Primary | ICD-10-CM

## 2020-07-16 PROCEDURE — 99213 OFFICE O/P EST LOW 20 MIN: CPT | Performed by: OBSTETRICS & GYNECOLOGY

## 2020-07-16 RX ORDER — LISINOPRIL 5 MG/1
TABLET ORAL
COMMUNITY
Start: 2020-06-01 | End: 2020-08-26

## 2020-07-16 NOTE — PROGRESS NOTES
Candi Del Rio is a 44 y.o. patient who presents for follow up of   Chief Complaint   Patient presents with   • Follow-up     44-year-old established patient here for follow-up of progesterone only pills.  She has had an ablation in the past ( 2006).  She has a period every month but they are variable in length and severity.  She does have a fibroid uterus on ultrasound.  All of her fibroids are less than 2 cm.  We did do an endometrial biopsy that was normal but limited due to her history of ablation.  She was started on progesterone only pills to try to help regulate her cycle.  She says pills are helping quite a bit.  Her cycles are more regular and are much lighter.  She is not having any clots and overall feels more energetic.  She is not noticed any side effects from her pills and her weight is stable.  Her blood pressure is also normal.  We had planned on doing a CBC, however, she is really having such light bleeding and does not feel badly that we will hold off on drawing that lab at this time.        The following portions of the patient's history were reviewed and updated as appropriate: allergies, current medications and problem list.    Review of Systems   Constitutional: Positive for activity change (quarantine). Negative for appetite change, fatigue, fever and unexpected weight change.   Eyes: Negative for photophobia and visual disturbance.   Respiratory: Negative for cough and shortness of breath.    Cardiovascular: Negative for chest pain and palpitations.   Gastrointestinal: Negative for abdominal distention, abdominal pain, constipation, diarrhea and nausea.   Endocrine: Negative for cold intolerance and heat intolerance.   Genitourinary: Negative for dyspareunia, dysuria, menstrual problem, pelvic pain and vaginal discharge.   Musculoskeletal: Negative for back pain.   Skin: Negative for color change and rash.   Neurological: Negative for headaches.   Hematological: Negative for adenopathy.  "Does not bruise/bleed easily.   Psychiatric/Behavioral: Negative for dysphoric mood. The patient is not nervous/anxious.        /70   Ht 157.5 cm (62.01\")   Wt 120 kg (264 lb)   LMP 07/11/2020   BMI 48.27 kg/m²     Physical Exam   Constitutional: She is oriented to person, place, and time. She appears well-developed and well-nourished.   HENT:   Head: Normocephalic and atraumatic.   Eyes: Conjunctivae are normal. No scleral icterus.   Abdominal: Soft. She exhibits no distension and no mass. There is no tenderness. There is no rebound and no guarding. No hernia.   Neurological: She is alert and oriented to person, place, and time.   Skin: Skin is warm and dry.   Psychiatric: She has a normal mood and affect. Her behavior is normal. Judgment and thought content normal.   Nursing note and vitals reviewed.      A/P:  1. H/O mental menorrhagia-patient had ablation in 2006 but has developed some small fibroids and irregular bleeding.  She did have a nondiagnostic endometrial biopsy but an otherwise normal ultrasound.  She is doing well on Micronor.  Her cycles are lighter and more regular.  She not had any side effects from her new pills.  We discussed that if she has any prolonged, heavy or painful cycles despite being on Micronor, she should call for further evaluation.  She did not feel that she needs any additional treatment at this time.  2. RHM- UTD 2/2020  3. RHM- RTO 2/2021 annual or prn.     Assessment/Plan   Candi was seen today for follow-up.    Diagnoses and all orders for this visit:    Menometrorrhagia                 No follow-ups on file.      Shae Gage MD    7/16/2020  10:06  "

## 2020-07-20 ENCOUNTER — OFFICE VISIT (OUTPATIENT)
Dept: INTERNAL MEDICINE | Facility: CLINIC | Age: 45
End: 2020-07-20

## 2020-07-20 VITALS
BODY MASS INDEX: 48.71 KG/M2 | OXYGEN SATURATION: 97 % | SYSTOLIC BLOOD PRESSURE: 122 MMHG | TEMPERATURE: 97.6 F | HEART RATE: 90 BPM | HEIGHT: 62 IN | WEIGHT: 264.7 LBS | DIASTOLIC BLOOD PRESSURE: 78 MMHG

## 2020-07-20 DIAGNOSIS — E11.9 DIABETES MELLITUS TYPE 2, NONINSULIN DEPENDENT (HCC): Primary | ICD-10-CM

## 2020-07-20 DIAGNOSIS — I10 ESSENTIAL HYPERTENSION: ICD-10-CM

## 2020-07-20 DIAGNOSIS — E66.01 CLASS 3 SEVERE OBESITY WITH SERIOUS COMORBIDITY AND BODY MASS INDEX (BMI) OF 50.0 TO 59.9 IN ADULT, UNSPECIFIED OBESITY TYPE (HCC): ICD-10-CM

## 2020-07-20 PROCEDURE — 99214 OFFICE O/P EST MOD 30 MIN: CPT | Performed by: NURSE PRACTITIONER

## 2020-07-20 NOTE — PROGRESS NOTES
Subjective   Candi Del Rio is a 44 y.o. female.      History of Present Illness   The patient is here today to F/U on lab work. Due for labs, can draw today.   She is feeling well. Down 6 lbs from February. Struggling with stress.  Her dtr has started using youtube video exercises, she may think about doing some walking videos.     DM2-Pt is doing well with current medication regimen, denies adverse reactions, compliant with medication schedule.   HTN-Pt is doing well with current medication regimen, denies adverse reactions, compliant with medication schedule.     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 Age  to 5th grade. She is concerned about her health regarding this. She will be helping with wiping down everything.   She is with [employer].   The following portions of the patient's history were reviewed and updated as appropriate: allergies, current medications, past family history, past medical history, past social history, past surgical history and problem list.    Review of Systems   Constitutional: Negative for chills and fever.   Respiratory: Negative.    Cardiovascular: Negative.    Psychiatric/Behavioral: Positive for stress. Negative for dysphoric mood and suicidal ideas. The patient is nervous/anxious.        Objective   Physical Exam   Constitutional: She appears well-developed and well-nourished.   Neck: Normal range of motion. Neck supple. No thyromegaly present.   Cardiovascular: Normal rate, regular rhythm, normal heart sounds and intact distal pulses.   Pulmonary/Chest: Effort normal and breath sounds normal.   Lymphadenopathy:     She has no cervical adenopathy.   Skin: Skin is warm and dry.   Psychiatric: She has a normal mood and affect. Her behavior is normal. Judgment and thought content normal.       Vitals:    07/20/20 1320   BP: 122/78   Pulse: 90   Temp: 97.6 °F (36.4 °C)   SpO2: 97%     Body mass index  is 48.4 kg/m².      Assessment/Plan   Candi was seen today for diabetes and hypertension.    Diagnoses and all orders for this visit:    Diabetes mellitus type 2, noninsulin dependent (CMS/HCC)  -     MicroAlbumin, Urine, Random - Urine, Clean Catch    Essential hypertension    Class 3 severe obesity with serious comorbidity and body mass index (BMI) of 50.0 to 59.9 in adult, unspecified obesity type (CMS/HCC)               1. DM2- labs today, down 6 lbs, strongly encouraged exercise and low sugar diet  2. HTN- well controlled  3. Obesity- continue GLP1 medicine, increase exercise, work on wt loss.     UTD with Derm  Pneumovax- discussed, pt defers  Discussed current research available regarding COVID and appropriate way to wear masks/hand washing.

## 2020-07-21 LAB
25(OH)D3+25(OH)D2 SERPL-MCNC: 19.6 NG/ML (ref 30–100)
ALBUMIN SERPL-MCNC: 4.3 G/DL (ref 3.5–5.2)
ALBUMIN/GLOB SERPL: 1.6 G/DL
ALP SERPL-CCNC: 62 U/L (ref 39–117)
ALT SERPL-CCNC: 12 U/L (ref 1–33)
AST SERPL-CCNC: 10 U/L (ref 1–32)
BILIRUB SERPL-MCNC: 0.2 MG/DL (ref 0–1.2)
BUN SERPL-MCNC: 9 MG/DL (ref 6–20)
BUN/CREAT SERPL: 12.5 (ref 7–25)
CALCIUM SERPL-MCNC: 9.1 MG/DL (ref 8.6–10.5)
CHLORIDE SERPL-SCNC: 101 MMOL/L (ref 98–107)
CO2 SERPL-SCNC: 28 MMOL/L (ref 22–29)
CREAT SERPL-MCNC: 0.72 MG/DL (ref 0.57–1)
GLOBULIN SER CALC-MCNC: 2.7 GM/DL
GLUCOSE SERPL-MCNC: 192 MG/DL (ref 65–99)
HBA1C MFR BLD: 8.2 % (ref 4.8–5.6)
MICROALBUMIN UR-MCNC: 14.8 UG/ML
POTASSIUM SERPL-SCNC: 4.4 MMOL/L (ref 3.5–5.2)
PROT SERPL-MCNC: 7 G/DL (ref 6–8.5)
SODIUM SERPL-SCNC: 138 MMOL/L (ref 136–145)

## 2020-07-21 RX ORDER — ERGOCALCIFEROL 1.25 MG/1
50000 CAPSULE ORAL WEEKLY
Qty: 8 CAPSULE | Refills: 0 | Status: SHIPPED | OUTPATIENT
Start: 2020-07-21 | End: 2020-10-08 | Stop reason: SDUPTHER

## 2020-07-22 RX ORDER — LANCETS 30 GAUGE
EACH MISCELLANEOUS
Qty: 100 EACH | Refills: 11 | Status: SHIPPED | OUTPATIENT
Start: 2020-07-22 | End: 2020-07-22 | Stop reason: SDUPTHER

## 2020-07-22 RX ORDER — LANCETS 30 GAUGE
1 EACH MISCELLANEOUS DAILY
Qty: 100 EACH | Refills: 11 | Status: SHIPPED | OUTPATIENT
Start: 2020-07-22 | End: 2020-10-08 | Stop reason: SDUPTHER

## 2020-08-20 DIAGNOSIS — E11.9 DIABETES MELLITUS TYPE 2, NONINSULIN DEPENDENT (HCC): Primary | ICD-10-CM

## 2020-08-20 LAB
BUN SERPL-MCNC: 8 MG/DL (ref 6–20)
BUN/CREAT SERPL: 12.1 (ref 7–25)
CALCIUM SERPL-MCNC: 8.7 MG/DL (ref 8.6–10.5)
CHLORIDE SERPL-SCNC: 102 MMOL/L (ref 98–107)
CO2 SERPL-SCNC: 25.1 MMOL/L (ref 22–29)
CREAT SERPL-MCNC: 0.66 MG/DL (ref 0.57–1)
GLUCOSE SERPL-MCNC: 168 MG/DL (ref 65–99)
POTASSIUM SERPL-SCNC: 4.1 MMOL/L (ref 3.5–5.2)
SODIUM SERPL-SCNC: 138 MMOL/L (ref 136–145)

## 2020-08-26 ENCOUNTER — OFFICE VISIT (OUTPATIENT)
Dept: INTERNAL MEDICINE | Facility: CLINIC | Age: 45
End: 2020-08-26

## 2020-08-26 VITALS
TEMPERATURE: 97.1 F | HEART RATE: 91 BPM | DIASTOLIC BLOOD PRESSURE: 74 MMHG | SYSTOLIC BLOOD PRESSURE: 118 MMHG | OXYGEN SATURATION: 98 % | HEIGHT: 62 IN | WEIGHT: 264.8 LBS | BODY MASS INDEX: 48.73 KG/M2

## 2020-08-26 DIAGNOSIS — E11.9 DIABETES MELLITUS TYPE 2, NONINSULIN DEPENDENT (HCC): Primary | ICD-10-CM

## 2020-08-26 DIAGNOSIS — R51.9 MORNING HEADACHE: ICD-10-CM

## 2020-08-26 DIAGNOSIS — R06.83 SNORING: ICD-10-CM

## 2020-08-26 PROCEDURE — 99213 OFFICE O/P EST LOW 20 MIN: CPT | Performed by: NURSE PRACTITIONER

## 2020-08-26 NOTE — PROGRESS NOTES
Subjective   Candi Del Rio is a 44 y.o. female.     History of Present Illness    The patient is here today to f/u on DM2. She deferred starting the farxiga and has been trying to eat better. She is working again so her steps are higher. Fasting is BG is usually 130. Mid day 150s, end of day 180-200.     She reports her sleep is not good she will sleep from 5-6 hrs a night. She is snoring. She has scheduled a sleep study but did not do it.     The following portions of the patient's history were reviewed and updated as appropriate: allergies, current medications, past family history, past medical history, past social history, past surgical history and problem list.    Review of Systems   Constitutional: Negative for chills and fever.   Respiratory: Negative.    Cardiovascular: Negative.    Psychiatric/Behavioral: Negative for dysphoric mood and suicidal ideas. The patient is not nervous/anxious.        Objective   Physical Exam   Constitutional: She appears well-developed and well-nourished.   Neck: Normal range of motion. Neck supple. No thyromegaly present.   Cardiovascular: Normal rate, regular rhythm, normal heart sounds and intact distal pulses.   Pulmonary/Chest: Effort normal and breath sounds normal.   Lymphadenopathy:     She has no cervical adenopathy.   Skin: Skin is warm and dry.   Psychiatric: She has a normal mood and affect. Her behavior is normal. Judgment and thought content normal.       Vitals:    08/26/20 0916   BP: 118/74   Pulse: 91   Temp: 97.1 °F (36.2 °C)   SpO2: 98%     Body mass index is 48.42 kg/m².      Assessment/Plan   Candi was seen today for diabetes.    Diagnoses and all orders for this visit:    Diabetes mellitus type 2, noninsulin dependent (CMS/HCC)  -     dapagliflozin (Farxiga) 5 MG tablet tablet; Take 1 tablet by mouth Daily.    Snoring  -     Ambulatory Referral to Sleep Medicine    Morning headache  -     Ambulatory Referral to Sleep Medicine                 1. DM2-  suggested she start Farxiga, pt defers she will continue to work on exercise, diet and wt loss  2. Snoring/morning headaches- check sleep study

## 2020-08-31 ENCOUNTER — TELEPHONE (OUTPATIENT)
Dept: INTERNAL MEDICINE | Facility: CLINIC | Age: 45
End: 2020-08-31

## 2020-08-31 NOTE — TELEPHONE ENCOUNTER
RX SENT TO PHARMACY AND PT AWARE.      ----- Message from WESLEY Escalera sent at 8/31/2020  9:59 AM EDT -----  Ok for jardiance 10 mg PO daily   ----- Message -----  From: Ksenia Barker  Sent: 8/31/2020   7:46 AM EDT  To: WESLEY Escalera    Pt's Farxiga is denied by her insurance and alternatives are Jardiance, Invokana.

## 2020-10-05 ENCOUNTER — APPOINTMENT (OUTPATIENT)
Dept: SLEEP MEDICINE | Facility: HOSPITAL | Age: 45
End: 2020-10-05

## 2020-10-06 ENCOUNTER — OFFICE VISIT (OUTPATIENT)
Dept: SLEEP MEDICINE | Facility: HOSPITAL | Age: 45
End: 2020-10-06

## 2020-10-06 VITALS
SYSTOLIC BLOOD PRESSURE: 142 MMHG | DIASTOLIC BLOOD PRESSURE: 87 MMHG | BODY MASS INDEX: 48.58 KG/M2 | HEIGHT: 62 IN | HEART RATE: 92 BPM | WEIGHT: 264 LBS

## 2020-10-06 DIAGNOSIS — R06.83 SNORING: ICD-10-CM

## 2020-10-06 DIAGNOSIS — E11.9 DIABETES MELLITUS TYPE 2, NONINSULIN DEPENDENT (HCC): ICD-10-CM

## 2020-10-06 DIAGNOSIS — G47.33 OBSTRUCTIVE SLEEP APNEA, ADULT: Primary | ICD-10-CM

## 2020-10-06 PROCEDURE — G0463 HOSPITAL OUTPT CLINIC VISIT: HCPCS

## 2020-10-06 NOTE — PROGRESS NOTES
PULMONARY  CONSULT NOTE      PATIENT IDENTIFICATION:  Name: Candi Del Rio  Age: 45 y.o.  Sex: female  :  1975  MRN: [id number]    DATE OF CONSULTATION:  10/6/2020   Referring Physician: No admitting provider for patient encounter.                  CHIEF COMPLAINT: APRIL  History of Present Illness:   Candi Del Rio is a 45 y.o. female Pt with still multiple wakening up at night with sleepiness fatigue and snoring, witnessed apnea, Hard  to get up in the morning. Daytime fatigue sleepiness loss of energy, [place] score of (16 )   Over more than 2 years pt started having trouble falling a sleep at night for 1-2 hours even dark quit bed room, tried different meds ( ambien, klonopin hydroxyzine, and over the over counter, also multiple wakening up at night every 2 hours 15-30 min, in Am still tired weak and fatigue, denies any nabs during the day.         Review of Systems:   Constitutional:  As above   Eyes: negative   ENT/oropharynx: negative   Cardiovascular: negative   Respiratory: negative   Gastrointestinal: negative   Genitourinary: negative   Neurological: negative   Musculoskeletal: negative   Integument/breast: negative   Endocrine: negative   Allergic/Immunologic: negative     Past Medical History:  Past Medical History:   Diagnosis Date   • Anemia     Iron deficiency   • Diabetes mellitus (CMS/HCC)    • Fibroid    • History of transfusion    • Vitamin D deficiency disease        Past Surgical History:  Past Surgical History:   Procedure Laterality Date   •  SECTION      x2   • CHOLECYSTECTOMY     • COLONOSCOPY N/A 2016    Procedure: COLONOSCOPY w/ polypectomy;  Surgeon: Darlene Vargas MD;  Location: [facility];  Service:    • ENDOMETRIAL ABLATION     • LAPAROSCOPIC CHOLECYSTECTOMY     • TUBAL ABDOMINAL LIGATION          Family History:  Family History   Problem Relation Age of Onset   • Diabetes Mother    • Hypertension Mother    • Colon cancer Father 70   • Diabetes Father    •  Hypertension Father    • Colon polyps Father    • Prostate cancer Maternal Grandfather    • Diabetes Sister    • Diabetes Brother    • Diabetes Brother    • Breast cancer Neg Hx    • Ovarian cancer Neg Hx    • Uterine cancer Neg Hx    • Deep vein thrombosis Neg Hx    • Pulmonary embolism Neg Hx         Social History:   Social History     Tobacco Use   • Smoking status: Never Smoker   • Smokeless tobacco: Never Used   Substance Use Topics   • Alcohol use: No        Allergies:  No Known Allergies    Home Meds:  (Not in a hospital admission)      Objective:    Vitals Ranges:   Heart Rate:  [92] 92  BP: (142)/(87) 142/87  Body mass index is 48.29 kg/m².     Exam:  General Appearance:  WDWN    HEENT:   without obvious abnormality,  Conjunctiva/corneas clear,  Normal external ear canals, no drainage    Clear orsalmucosa,  Mallampati score 3    Neck:  Supple, symmetrical, trachea midline. No JVD.  Lungs:   Bilateral basal rhonchi bilaterally, respirations unlabored symmetrical wall movement.    Chest wall:  No tenderness or deformity.    Heart:  Regular rate and rhythm, S1 and S2 normal.  Extremities: Trace edema no clubbing or Cyanosis        Data Review:  All labs (24hrs): No results found for this or any previous visit (from the past 24 hour(s)).     Imaging:  Mammo Screening Digital Tomosynthesis Bilateral With CAD  Narrative: EXAM, 11/27/2019:  1. Bilateral digital screening mammogram with CAD.  2. Bilateral digital screening breast tomosynthesis.     INDICATION:  Routine screening     TECHNIQUE:  Bilateral digital screening mammogram images were obtained and reviewed  with CAD. Digital breast tomosynthesis images were also reviewed.     COMPARISON:  *  Screening mammogram, 04/06/2018     FINDINGS:  There are scattered areas of fibroglandular tissue. No significant  change when compared with prior images. No mammographic evidence of  malignancy. Recommend repeat screening mammogram in one year.     Impression:  Negative mammogram     BI-RADS CATEGORY 1: Negative        Women over the age of 40 undergoing screening mammography are entered  into a reminder system with target due date for the next mammogram.     This report was finalized on 11/27/2019 1:58 PM by Dr. Mayur Tompkins MD.          ASSESSMENT:  Diagnoses and all orders for this visit:    Obstructive sleep apnea, adult  -     Polysomnography 4 or More Parameters; Future    Snoring    Diabetes mellitus type 2, noninsulin dependent (CMS/HCC)        PLAN:   This is patient with symptoms of obstructive sleep apnea, NPSG study ASAP / split night study, Avoid supine avoid sedative meds in pm, weight loss, Avoid driving. Long discussion with patient about the physiology of APRIL, and long term and short term   benefit of treating APRIL      Treating APRIL will improve blood glucose control      Bernard Major MD. D, ABSM.  10/6/2020  14:02 EDT

## 2020-10-08 DIAGNOSIS — E11.9 DIABETES MELLITUS TYPE 2, NONINSULIN DEPENDENT (HCC): ICD-10-CM

## 2020-10-09 ENCOUNTER — TRANSCRIBE ORDERS (OUTPATIENT)
Dept: ADMINISTRATIVE | Facility: HOSPITAL | Age: 45
End: 2020-10-09

## 2020-10-09 DIAGNOSIS — Z01.818 OTHER SPECIFIED PRE-OPERATIVE EXAMINATION: Primary | ICD-10-CM

## 2020-10-11 RX ORDER — LANCETS 30 GAUGE
1 EACH MISCELLANEOUS DAILY
Qty: 100 EACH | Refills: 11 | Status: SHIPPED | OUTPATIENT
Start: 2020-10-11 | End: 2021-04-06

## 2020-10-11 RX ORDER — ERGOCALCIFEROL 1.25 MG/1
50000 CAPSULE ORAL WEEKLY
Qty: 8 CAPSULE | Refills: 0 | Status: SHIPPED | OUTPATIENT
Start: 2020-10-11 | End: 2020-12-28

## 2020-10-11 RX ORDER — SEMAGLUTIDE 1.34 MG/ML
1 INJECTION, SOLUTION SUBCUTANEOUS WEEKLY
Qty: 2 PEN | Refills: 4 | Status: SHIPPED | OUTPATIENT
Start: 2020-10-11 | End: 2021-04-06 | Stop reason: SINTOL

## 2020-10-11 RX ORDER — CALCIUM CITRATE/VITAMIN D3 200MG-6.25
1 TABLET ORAL 2 TIMES DAILY
Qty: 100 EACH | Refills: 0 | Status: SHIPPED | OUTPATIENT
Start: 2020-10-11 | End: 2020-12-28

## 2020-10-15 ENCOUNTER — OFFICE VISIT (OUTPATIENT)
Dept: INTERNAL MEDICINE | Facility: CLINIC | Age: 45
End: 2020-10-15

## 2020-10-15 VITALS
HEART RATE: 85 BPM | WEIGHT: 259.8 LBS | HEIGHT: 62 IN | TEMPERATURE: 97.3 F | DIASTOLIC BLOOD PRESSURE: 74 MMHG | BODY MASS INDEX: 47.81 KG/M2 | OXYGEN SATURATION: 98 % | SYSTOLIC BLOOD PRESSURE: 110 MMHG

## 2020-10-15 DIAGNOSIS — J30.2 SEASONAL ALLERGIC RHINITIS, UNSPECIFIED TRIGGER: ICD-10-CM

## 2020-10-15 DIAGNOSIS — M26.629 TMJ SYNDROME: Primary | ICD-10-CM

## 2020-10-15 PROCEDURE — 99213 OFFICE O/P EST LOW 20 MIN: CPT | Performed by: NURSE PRACTITIONER

## 2020-10-15 RX ORDER — EMPAGLIFLOZIN 10 MG/1
TABLET, FILM COATED ORAL
COMMUNITY
End: 2020-11-03

## 2020-10-15 NOTE — PROGRESS NOTES
Subjective   Candi Del Rio is a 45 y.o. female.     Chief Complaint   Patient presents with   • Earache     Pt c/o left earache X 3 days.        History of Present Illness   She is here today as a new patient to me with c/o left ear pain x 3 days. She has tried ibuprofen with relief of pain. No prior hx of AOM. She feels like this staying the same. She does notice that her jaw pops on the left side and she grinds her teeth at night. She has been chewing more gum recently.  She denies otorrhea, tinnitus, loss of hearing, sick contacts, or recent illness.     The following portions of the patient's history were reviewed and updated as appropriate: allergies, current medications, past family history, past medical history, past social history, past surgical history and problem list.    Review of Systems   Constitutional: Negative for chills, fatigue and fever.   HENT: Positive for ear pain (left ear). Negative for congestion, hearing loss, postnasal drip, sore throat, tinnitus and trouble swallowing.    Respiratory: Negative for cough, chest tightness, shortness of breath and wheezing.    Cardiovascular: Negative for chest pain, palpitations and leg swelling.   Psychiatric/Behavioral: Negative for suicidal ideas and depressed mood. The patient is not nervous/anxious.        Objective   Physical Exam  Constitutional:       Appearance: She is well-developed.   HENT:      Head:      Jaw: Tenderness (at left TMJ) and pain on movement present. No trismus, swelling or malocclusion.      Comments: Pain on palpation of left TMJ  Pain left jaw with ROM  Palpable click at left TMJ.     Right Ear: Hearing, tympanic membrane, ear canal and external ear normal.      Left Ear: Hearing, ear canal and external ear normal. A middle ear effusion is present.   Neck:      Thyroid: No thyroid mass, thyromegaly or thyroid tenderness.      Vascular: No carotid bruit.      Trachea: Trachea normal.   Cardiovascular:      Rate and Rhythm:  Normal rate and regular rhythm.      Chest Wall: PMI is not displaced.      Pulses:           Radial pulses are 2+ on the right side and 2+ on the left side.        Dorsalis pedis pulses are 2+ on the right side and 2+ on the left side.        Posterior tibial pulses are 2+ on the right side and 2+ on the left side.      Heart sounds: S1 normal and S2 normal.   Pulmonary:      Effort: Pulmonary effort is normal.      Breath sounds: Normal breath sounds.   Musculoskeletal:      Right lower leg: No edema.      Left lower leg: No edema.   Lymphadenopathy:      Head:      Right side of head: No submental, submandibular, tonsillar or occipital adenopathy.      Left side of head: No submental, submandibular, tonsillar or occipital adenopathy.      Cervical: No cervical adenopathy.   Neurological:      Mental Status: She is alert and oriented to person, place, and time.   Psychiatric:         Attention and Perception: Attention normal.         Mood and Affect: Mood and affect normal.         Speech: Speech normal.         Behavior: Behavior normal.         Thought Content: Thought content normal.         Cognition and Memory: Cognition normal.         Vitals:    10/15/20 1139   BP: 110/74   Pulse: 85   Temp: 97.3 °F (36.3 °C)   SpO2: 98%          Assessment/Plan   Diagnoses and all orders for this visit:    1. TMJ syndrome (Primary)    2. Seasonal allergic rhinitis, unspecified trigger        1. TMJ syndrome- discussed jaw rest, avoid gum, suggest soft diet. Continue ibuprofen PRN, take with food, hydrate well. Work on stress relief techniques. If symptoms continue, suggest seeing dentist to discuss bite guard.   2. AR/eustachian tube dysfunction- restart singulair and add NSS once daily.    Flu vaccine discussed- pt defers

## 2020-10-16 ENCOUNTER — LAB (OUTPATIENT)
Dept: LAB | Facility: HOSPITAL | Age: 45
End: 2020-10-16

## 2020-10-16 DIAGNOSIS — Z01.818 OTHER SPECIFIED PRE-OPERATIVE EXAMINATION: ICD-10-CM

## 2020-10-16 PROCEDURE — C9803 HOPD COVID-19 SPEC COLLECT: HCPCS

## 2020-10-16 PROCEDURE — U0004 COV-19 TEST NON-CDC HGH THRU: HCPCS | Performed by: OBSTETRICS & GYNECOLOGY

## 2020-10-17 LAB — SARS-COV-2 RNA RESP QL NAA+PROBE: NOT DETECTED

## 2020-10-19 ENCOUNTER — HOSPITAL ENCOUNTER (OUTPATIENT)
Dept: SLEEP MEDICINE | Facility: HOSPITAL | Age: 45
Discharge: HOME | End: 2020-10-19
Admitting: INTERNAL MEDICINE

## 2020-10-19 DIAGNOSIS — G47.33 OBSTRUCTIVE SLEEP APNEA, ADULT: ICD-10-CM

## 2020-10-19 PROCEDURE — 95810 POLYSOM 6/> YRS 4/> PARAM: CPT

## 2020-10-22 ENCOUNTER — TELEPHONE (OUTPATIENT)
Dept: SLEEP MEDICINE | Facility: HOSPITAL | Age: 45
End: 2020-10-22

## 2020-10-27 DIAGNOSIS — E11.9 DIABETES MELLITUS TYPE 2, NONINSULIN DEPENDENT (HCC): ICD-10-CM

## 2020-10-27 DIAGNOSIS — E55.9 VITAMIN D DEFICIENCY: Primary | ICD-10-CM

## 2020-10-30 LAB
25(OH)D3+25(OH)D2 SERPL-MCNC: 29 NG/ML (ref 30–100)
ALBUMIN SERPL-MCNC: 3.8 G/DL (ref 3.5–5.2)
ALBUMIN/GLOB SERPL: 1.5 G/DL
ALP SERPL-CCNC: 56 U/L (ref 39–117)
ALT SERPL-CCNC: 11 U/L (ref 1–33)
AST SERPL-CCNC: 10 U/L (ref 1–32)
BILIRUB SERPL-MCNC: 0.2 MG/DL (ref 0–1.2)
BUN SERPL-MCNC: 9 MG/DL (ref 6–20)
BUN/CREAT SERPL: 13.4 (ref 7–25)
CALCIUM SERPL-MCNC: 8.7 MG/DL (ref 8.6–10.5)
CHLORIDE SERPL-SCNC: 104 MMOL/L (ref 98–107)
CHOLEST SERPL-MCNC: 160 MG/DL (ref 0–200)
CO2 SERPL-SCNC: 24.8 MMOL/L (ref 22–29)
CREAT SERPL-MCNC: 0.67 MG/DL (ref 0.57–1)
GLOBULIN SER CALC-MCNC: 2.6 GM/DL
GLUCOSE SERPL-MCNC: 168 MG/DL (ref 65–99)
HBA1C MFR BLD: 7.7 % (ref 4.8–5.6)
HDLC SERPL-MCNC: 52 MG/DL (ref 40–60)
LDLC SERPL CALC-MCNC: 87 MG/DL (ref 0–100)
LDLC/HDLC SERPL: 1.63 {RATIO}
POTASSIUM SERPL-SCNC: 4.2 MMOL/L (ref 3.5–5.2)
PROT SERPL-MCNC: 6.4 G/DL (ref 6–8.5)
SODIUM SERPL-SCNC: 139 MMOL/L (ref 136–145)
TRIGL SERPL-MCNC: 115 MG/DL (ref 0–150)
VLDLC SERPL CALC-MCNC: 21 MG/DL (ref 5–40)

## 2020-11-03 ENCOUNTER — OFFICE VISIT (OUTPATIENT)
Dept: SLEEP MEDICINE | Facility: HOSPITAL | Age: 45
End: 2020-11-03

## 2020-11-03 ENCOUNTER — OFFICE VISIT (OUTPATIENT)
Dept: INTERNAL MEDICINE | Facility: CLINIC | Age: 45
End: 2020-11-03

## 2020-11-03 VITALS
DIASTOLIC BLOOD PRESSURE: 72 MMHG | WEIGHT: 271 LBS | TEMPERATURE: 97.2 F | OXYGEN SATURATION: 97 % | HEART RATE: 109 BPM | BODY MASS INDEX: 49.87 KG/M2 | SYSTOLIC BLOOD PRESSURE: 118 MMHG | HEIGHT: 62 IN

## 2020-11-03 VITALS
WEIGHT: 267 LBS | HEART RATE: 81 BPM | HEIGHT: 62 IN | DIASTOLIC BLOOD PRESSURE: 89 MMHG | SYSTOLIC BLOOD PRESSURE: 141 MMHG | BODY MASS INDEX: 49.13 KG/M2

## 2020-11-03 DIAGNOSIS — E66.01 MORBID (SEVERE) OBESITY DUE TO EXCESS CALORIES (HCC): ICD-10-CM

## 2020-11-03 DIAGNOSIS — G47.33 OBSTRUCTIVE SLEEP APNEA, ADULT: Primary | ICD-10-CM

## 2020-11-03 DIAGNOSIS — I10 ESSENTIAL HYPERTENSION: ICD-10-CM

## 2020-11-03 DIAGNOSIS — E11.9 DIABETES MELLITUS TYPE 2, NONINSULIN DEPENDENT (HCC): Primary | ICD-10-CM

## 2020-11-03 PROCEDURE — 90732 PPSV23 VACC 2 YRS+ SUBQ/IM: CPT | Performed by: NURSE PRACTITIONER

## 2020-11-03 PROCEDURE — 90715 TDAP VACCINE 7 YRS/> IM: CPT | Performed by: NURSE PRACTITIONER

## 2020-11-03 PROCEDURE — G0463 HOSPITAL OUTPT CLINIC VISIT: HCPCS

## 2020-11-03 PROCEDURE — 90471 IMMUNIZATION ADMIN: CPT | Performed by: NURSE PRACTITIONER

## 2020-11-03 PROCEDURE — 99214 OFFICE O/P EST MOD 30 MIN: CPT | Performed by: NURSE PRACTITIONER

## 2020-11-03 PROCEDURE — 90472 IMMUNIZATION ADMIN EACH ADD: CPT | Performed by: NURSE PRACTITIONER

## 2020-11-03 NOTE — PROGRESS NOTES
Subjective   Candi Del Rio is a 45 y.o. female. Patient is here today for   Chief Complaint   Patient presents with   • Diabetes     Pt here to follow up on DM. Pt has no concerns.    .    History of Present Illness   Here to follow up on diabetes which is controlled on current medications. Denies any problems with low glucose readings. She is a patient of [email or web address] and was started on jardiance about 2 months ago. States that she was given samples of farxiga 5 mg, but her insurance wouldn't cover that med, so she was changed to jardiance 10 mg daily. States that she has to urinate all the time and is wondering if the dosage can be decreased.     The following portions of the patient's history were reviewed and updated as appropriate: allergies, current medications, past family history, past medical history, past social history, past surgical history and problem list.    Review of Systems   Constitutional: Negative.    Respiratory: Negative.    Cardiovascular: Negative.    Endocrine: Negative.    Genitourinary: Positive for frequency. Negative for dysuria and urgency.       Objective     Vitals:    11/03/20 1106   BP: 118/72   Pulse: 109   Temp: 97.2 °F (36.2 °C)   SpO2: 97%     Body mass index is 49.57 kg/m².    Orders Only on 10/27/2020   Component Date Value Ref Range Status   • Glucose 10/29/2020 168* 65 - 99 mg/dL Final   • BUN 10/29/2020 9  6 - 20 mg/dL Final   • Creatinine 10/29/2020 0.67  0.57 - 1.00 mg/dL Final   • eGFR Non African Am 10/29/2020 95  >60 mL/min/1.73 Final   • eGFR African Am 10/29/2020 115  >60 mL/min/1.73 Final   • BUN/Creatinine Ratio 10/29/2020 13.4  7.0 - 25.0 Final   • Sodium 10/29/2020 139  136 - 145 mmol/L Final   • Potassium 10/29/2020 4.2  3.5 - 5.2 mmol/L Final   • Chloride 10/29/2020 104  98 - 107 mmol/L Final   • Total CO2 10/29/2020 24.8  22.0 - 29.0 mmol/L Final   • Calcium 10/29/2020 8.7  8.6 - 10.5 mg/dL Final   • Total Protein 10/29/2020 6.4  6.0 - 8.5 g/dL Final   •  Albumin 10/29/2020 3.80  3.50 - 5.20 g/dL Final   • Globulin 10/29/2020 2.6  gm/dL Final   • A/G Ratio 10/29/2020 1.5  g/dL Final   • Total Bilirubin 10/29/2020 0.2  0.0 - 1.2 mg/dL Final   • Alkaline Phosphatase 10/29/2020 56  39 - 117 U/L Final   • AST (SGOT) 10/29/2020 10  1 - 32 U/L Final   • ALT (SGPT) 10/29/2020 11  1 - 33 U/L Final   • Hemoglobin A1C 10/29/2020 7.70* 4.80 - 5.60 % Final    Comment: Hemoglobin A1C Ranges:  Increased Risk for Diabetes  5.7% to 6.4%  Diabetes                     >= 6.5%  Diabetic Goal                < 7.0%     • 25 Hydroxy, Vitamin D 10/29/2020 29.0* 30.0 - 100.0 ng/ml Final    Comment: Results may be falsely increased if patient taking Biotin.  Reference Range for Total Vitamin D 25(OH)  Deficiency <20.0 ng/mL  Insufficiency 21-29 ng/mL  Sufficiency  ng/mL  Toxicity >100 ng/ml     • Total Cholesterol 10/29/2020 160  0 - 200 mg/dL Final   • Triglycerides 10/29/2020 115  0 - 150 mg/dL Final   • HDL Cholesterol 10/29/2020 52  40 - 60 mg/dL Final   • VLDL Cholesterol Oniel 10/29/2020 21  5 - 40 mg/dL Final   • LDL Chol Calc (NIH) 10/29/2020 87  0 - 100 mg/dL Final   • LDL/HDL RATIO 10/29/2020 1.63   Final     Reviewed labs with patient.     Physical Exam  Vitals signs and nursing note reviewed.   Constitutional:       Appearance: She is well-developed.   Cardiovascular:      Rate and Rhythm: Normal rate and regular rhythm.      Heart sounds: Normal heart sounds.   Pulmonary:      Effort: Pulmonary effort is normal.      Breath sounds: Normal breath sounds.   Skin:     General: Skin is warm and dry.   Neurological:      Mental Status: She is alert and oriented to person, place, and time.   Psychiatric:         Speech: Speech normal.         Behavior: Behavior normal.         Thought Content: Thought content normal.         Assessment/Plan   Diagnoses and all orders for this visit:    1. Diabetes mellitus type 2, noninsulin dependent (CMS/HCC) (Primary)    Other orders  -      Tdap Vaccine Greater Than or Equal To 6yo IM  -     Pneumococcal Polysaccharide Vaccine 23-Valent (PPSV23) Greater Than or Equal To 3yo Subcutaneous / IM        Decrease jardiance to 5 mg (cut in 1/2). Continue with ozempic, metformin. F/u with [facility] in 4 months.         Current Outpatient Medications:   •  Lancets (BD Lancet Ultrafine 30G) misc, 1 each by Other route Daily. for testing blood sugar BID, Disp: 100 each, Rfl: 11  •  metFORMIN (GLUCOPHAGE) 1000 MG tablet, Take 1 tablet by mouth 2 (Two) Times a Day With Meals., Disp: 180 tablet, Rfl: 2  •  montelukast (SINGULAIR) 10 MG tablet, TAKE ONE TABLET BY MOUTH ONCE NIGHTLY, Disp: 30 tablet, Rfl: 4  •  Semaglutide, 1 MG/DOSE, (Ozempic, 1 MG/DOSE,) 2 MG/1.5ML solution pen-injector, Inject 1 mg under the skin into the appropriate area as directed 1 (One) Time Per Week., Disp: 2 pen, Rfl: 4  •  True Metrix Blood Glucose Test test strip, 1 each by Other route 2 (Two) Times a Day., Disp: 100 each, Rfl: 0  •  vitamin B-12 (CYANOCOBALAMIN) 1000 MCG tablet, Place 1 tablet under the tongue daily., Disp: , Rfl:   •  vitamin D (ERGOCALCIFEROL) 1.25 MG (38588 UT) capsule capsule, Take 1 capsule by mouth 1 (One) Time Per Week., Disp: 8 capsule, Rfl: 0

## 2020-11-03 NOTE — PROGRESS NOTES
PULMONARY  CONSULT NOTE      PATIENT IDENTIFICATION:  Name: Candi Del Rio  Age: 45 y.o.  Sex: female  :  1975  MRN: [id number]    DATE OF CONSULTATION:  11/3/2020   Referring Physician: No admitting provider for patient encounter.                  CHIEF COMPLAINT: Obstructive sleep apnea    History of Present Illness:   Candi Del Rio is a 45 y.o. female had sleep study done on (10/19/2020) found the pt to have APRIL with AHI  (23) and in REM sleep was (2) , RDI of 6.1 also primary snoring, sleep result discussed in details with patient.  Study showed frequent waking up with fragmented sleep      Review of Systems:   Constitutional: negative   Eyes: negative   ENT/oropharynx: negative   Cardiovascular: negative   Respiratory: negative   Gastrointestinal: negative   Genitourinary: negative   Neurological: negative   Musculoskeletal: negative   Integument/breast: negative   Endocrine: negative   Allergic/Immunologic: negative     Past Medical History:  Past Medical History:   Diagnosis Date   • Anemia     Iron deficiency   • Diabetes mellitus (CMS/HCC)    • Fibroid    • History of transfusion    • Vitamin D deficiency disease        Past Surgical History:  Past Surgical History:   Procedure Laterality Date   •  SECTION      x2   • CHOLECYSTECTOMY     • COLONOSCOPY N/A 2016    Procedure: COLONOSCOPY w/ polypectomy;  Surgeon: Darlene Vargas MD;  Location: [facility];  Service:    • ENDOMETRIAL ABLATION     • LAPAROSCOPIC CHOLECYSTECTOMY     • TUBAL ABDOMINAL LIGATION          Family History:  Family History   Problem Relation Age of Onset   • Diabetes Mother    • Hypertension Mother    • Colon cancer Father 70   • Diabetes Father    • Hypertension Father    • Colon polyps Father    • Prostate cancer Maternal Grandfather    • Diabetes Sister    • Diabetes Brother    • Diabetes Brother    • Breast cancer Neg Hx    • Ovarian cancer Neg Hx    • Uterine cancer Neg Hx    • Deep vein thrombosis Neg  "Hx    • Pulmonary embolism Neg Hx         Social History:   Social History     Tobacco Use   • Smoking status: Never Smoker   • Smokeless tobacco: Never Used   Substance Use Topics   • Alcohol use: No        Allergies:  No Known Allergies    Home Meds:  (Not in a hospital admission)      Objective:    Vitals Ranges:   Temp:  [97.2 °F (36.2 °C)] 97.2 °F (36.2 °C)  Heart Rate:  [] 81  BP: (118-141)/(72-89) 141/89  Body mass index is 48.83 kg/m².     Exam:  General Appearance:  WDWN    HEENT:   without obvious abnormality,  Conjunctiva/corneas clear,  Normal external ear canals, no drainage    Clear orsalmucosa,  Mallampati score 3    Neck:  Supple, symmetrical, trachea midline. No JVD.  Lungs:   Bilateral basal rhonchi bilaterally, respirations unlabored symmetrical wall movement.    Chest wall:  No tenderness or deformity.    Heart:  Regular rate and rhythm, S1 and S2 normal.  Extremities: Trace edema no clubbing or Cyanosis        Data Review:  All labs (24hrs): No results found for this or any previous visit (from the past 24 hour(s)).     Imaging:  Polysomnography 4 or More Parameters                                                                                                                               Polysomnography  PATIENT IDENTIFICATION:  Name: Candi Del Rio  Age: 45 y.o.  Sex: female  :  1975  MRN: [id number]    DATE OF Study: 10/19/2020   Referring Physician: [unfilled]    WEIGHT: 259   LB       HEIGHT: 62\"       BMI: 47    Reason For study:   Patient with daytime sleepiness  snoring at night , tiredness fatigue   feeling poor quality of sleep  recommendation to proceed  with   polysomnography.    Technical description:   this is an overnight polysomnography study using standardized parameters   including EEG, EOG, EKG, anterior tibialis and chin EMG, blood oxygen   saturation, oral nasal airflow, respiratory area for monitoring with both   abdominal and thoracic gauges and snore " monitoring using a standard   titration protocol.     Summary of sleep parameters:  patient went to bed at(10: 29) p.m, woke up with lightt on at(5: 28) a.m   total study time(419) minutes, total sleep time(335) minutes, latency to   sleep onset(31) minutes, latency to REM sleep (61) minutes, sleep   efficiency was  (80) %, sleep stages where(20)% REM sleep , the rest is   non-REM .     Summary of respiratory efforts:  Patient had  a total of (13) apnea hypopnea with  AHI(2.3) times per hour.   longest apnea (21) seconds,  total RDI was (6) times per hour, Lowest   desaturation was to (80s ), .     Summary over the heart rate:  Heart rate was fluctuating between (34) and   (89) bpm.     Summary of periodic leg movement:  Patient had a total of (0) periodic leg movement with arousal and index   (0) times per hour.      Impression:  this is a polysomnography study  showing the patient has mild obstructive   sleep apnea.         Recommendation:   Patient need to follow up with a sleep specialist for further   recommendation and management, avoid supine sleeping, and sedative   medication, avoid long-distance driving till treated and all symptoms   resolved.    MD. ASHWINI Robrets, ABSM.  10/20/2020  13:36 EDT        ASSESSMENT:  Diagnoses and all orders for this visit:    Obstructive sleep apnea, adult    Essential hypertension    Morbid (severe) obesity due to excess calories (CMS/[facility])        PLAN:    Avoid supine avoid sedative meds in pm, weight loss, Avoid driving. Long discussion with patient about the physiology of APRIL, and long term and short term   benefit of treating APRIL       MD. ASHWINI Roberts, ABSM.  11/3/2020  13:40 EST

## 2020-12-28 RX ORDER — ERGOCALCIFEROL 1.25 MG/1
CAPSULE ORAL
Qty: 12 CAPSULE | Refills: 1 | Status: SHIPPED | OUTPATIENT
Start: 2020-12-28 | End: 2021-04-06 | Stop reason: SDUPTHER

## 2020-12-28 RX ORDER — CALCIUM CITRATE/VITAMIN D3 200MG-6.25
TABLET ORAL
Qty: 200 EACH | Refills: 3 | Status: SHIPPED | OUTPATIENT
Start: 2020-12-28 | End: 2022-02-10

## 2021-03-30 ENCOUNTER — TELEPHONE (OUTPATIENT)
Dept: INTERNAL MEDICINE | Facility: CLINIC | Age: 46
End: 2021-03-30

## 2021-03-30 DIAGNOSIS — E55.9 VITAMIN D DEFICIENCY: ICD-10-CM

## 2021-03-30 DIAGNOSIS — E11.9 DIABETES MELLITUS TYPE 2, NONINSULIN DEPENDENT (HCC): Primary | ICD-10-CM

## 2021-03-30 DIAGNOSIS — I10 ESSENTIAL HYPERTENSION: ICD-10-CM

## 2021-03-30 DIAGNOSIS — E78.1 HYPERTRIGLYCERIDEMIA: ICD-10-CM

## 2021-03-30 NOTE — TELEPHONE ENCOUNTER
----- Message from WESLEY Escalera sent at 3/29/2021  4:53 PM EDT -----  CMP, A1C, TSH, Vit D  ----- Message -----  From: Ksenia Barker  Sent: 3/29/2021   2:54 PM EDT  To: WESLEY Escalera    PLEASE ADVISE FOR LABS

## 2021-04-01 ENCOUNTER — OFFICE VISIT (OUTPATIENT)
Dept: OBSTETRICS AND GYNECOLOGY | Facility: CLINIC | Age: 46
End: 2021-04-01

## 2021-04-01 VITALS
WEIGHT: 263.8 LBS | DIASTOLIC BLOOD PRESSURE: 78 MMHG | SYSTOLIC BLOOD PRESSURE: 124 MMHG | BODY MASS INDEX: 48.55 KG/M2 | HEIGHT: 62 IN

## 2021-04-01 DIAGNOSIS — Z13.9 SCREENING FOR CONDITION: ICD-10-CM

## 2021-04-01 DIAGNOSIS — Z01.419 ROUTINE GYNECOLOGICAL EXAMINATION: Primary | ICD-10-CM

## 2021-04-01 DIAGNOSIS — Z87.42 HISTORY OF MENORRHAGIA: ICD-10-CM

## 2021-04-01 DIAGNOSIS — Z30.011 VISIT FOR ORAL CONTRACEPTIVE PRESCRIPTION: ICD-10-CM

## 2021-04-01 LAB
25(OH)D3+25(OH)D2 SERPL-MCNC: 23.5 NG/ML (ref 30–100)
ALBUMIN SERPL-MCNC: 4 G/DL (ref 3.5–5.2)
ALBUMIN/GLOB SERPL: 1.5 G/DL
ALP SERPL-CCNC: 65 U/L (ref 39–117)
ALT SERPL-CCNC: 14 U/L (ref 1–33)
AST SERPL-CCNC: 11 U/L (ref 1–32)
B-HCG UR QL: NEGATIVE
BILIRUB BLD-MCNC: NEGATIVE MG/DL
BILIRUB SERPL-MCNC: 0.2 MG/DL (ref 0–1.2)
BUN SERPL-MCNC: 9 MG/DL (ref 6–20)
BUN/CREAT SERPL: 15.5 (ref 7–25)
CALCIUM SERPL-MCNC: 8.9 MG/DL (ref 8.6–10.5)
CHLORIDE SERPL-SCNC: 102 MMOL/L (ref 98–107)
CLARITY, POC: CLEAR
CO2 SERPL-SCNC: 25.9 MMOL/L (ref 22–29)
COLOR UR: YELLOW
CREAT SERPL-MCNC: 0.58 MG/DL (ref 0.57–1)
GLOBULIN SER CALC-MCNC: 2.7 GM/DL
GLUCOSE SERPL-MCNC: 244 MG/DL (ref 65–99)
GLUCOSE UR STRIP-MCNC: ABNORMAL MG/DL
HBA1C MFR BLD: 9 % (ref 4.8–5.6)
INTERNAL NEGATIVE CONTROL: NEGATIVE
INTERNAL POSITIVE CONTROL: POSITIVE
KETONES UR QL: NEGATIVE
LEUKOCYTE EST, POC: NEGATIVE
Lab: 55
NITRITE UR-MCNC: NEGATIVE MG/ML
PH UR: 5 [PH] (ref 5–8)
POTASSIUM SERPL-SCNC: 4.2 MMOL/L (ref 3.5–5.2)
PROT SERPL-MCNC: 6.7 G/DL (ref 6–8.5)
PROT UR STRIP-MCNC: NEGATIVE MG/DL
RBC # UR STRIP: NEGATIVE /UL
SODIUM SERPL-SCNC: 139 MMOL/L (ref 136–145)
SP GR UR: 1 (ref 1–1.03)
TSH SERPL DL<=0.005 MIU/L-ACNC: 2.16 UIU/ML (ref 0.27–4.2)
UROBILINOGEN UR QL: NORMAL

## 2021-04-01 PROCEDURE — 81002 URINALYSIS NONAUTO W/O SCOPE: CPT | Performed by: OBSTETRICS & GYNECOLOGY

## 2021-04-01 PROCEDURE — 81025 URINE PREGNANCY TEST: CPT | Performed by: OBSTETRICS & GYNECOLOGY

## 2021-04-01 PROCEDURE — 99396 PREV VISIT EST AGE 40-64: CPT | Performed by: OBSTETRICS & GYNECOLOGY

## 2021-04-01 RX ORDER — ACETAMINOPHEN AND CODEINE PHOSPHATE 120; 12 MG/5ML; MG/5ML
1 SOLUTION ORAL DAILY
Qty: 84 TABLET | Refills: 3 | Status: SHIPPED | OUTPATIENT
Start: 2021-04-01 | End: 2021-04-01 | Stop reason: SDUPTHER

## 2021-04-01 RX ORDER — ACETAMINOPHEN AND CODEINE PHOSPHATE 120; 12 MG/5ML; MG/5ML
1 SOLUTION ORAL DAILY
Qty: 84 TABLET | Refills: 3 | Status: SHIPPED | OUTPATIENT
Start: 2021-04-01 | End: 2021-07-06

## 2021-04-01 NOTE — PROGRESS NOTES
GYN Annual Exam     CC- Here for annual exam.     Candi Del Rio is a 45 y.o. female est pt  who presents for annual well woman exam.  She has a known history of a fibroid uterus.  She did have an ablation in . She had an US and limited endometrial biopsy last year and was started on Micronor for improved cycle control. Her cycles are regular, lighter and now last for about 5 days. She has not noticed any side effects from her POPs and wants to continue. She is requesting to have her primary care labs drawn today and is fasting.     OB History        2    Para   2    Term   2            AB        Living   2       SAB        TAB        Ectopic        Molar        Multiple        Live Births              Obstetric Comments   2 C/S             Menarche: 15  Current contraception: tubal ligation  History of abnormal Pap smear: no  History of abnormal mammogram: no  Family history of uterine, colon or ovarian cancer: yes - dad colon age 70  Family history of breast cancer: no  H/o STDs: none  Last pap: 2020- nl pap/HPV  Gardasil:missed  CHELA: none   Fibroid 2.6 cm in 2016  Endometrial ablation in     Health Maintenance   Topic Date Due   • Hepatitis B (1 of 3 - Risk 3-dose series) Never done   • DIABETIC EYE EXAM  2019   • MAMMOGRAM  2020   • Annual Gynecologic Pelvic and Breast Exam  2021   • DIABETIC FOOT EXAM  2021   • ANNUAL PHYSICAL  2021   • HEMOGLOBIN A1C  2021   • URINE MICROALBUMIN  2021   • INFLUENZA VACCINE  2021   • LIPID PANEL  10/29/2021   • COLONOSCOPY  2021   • PAP SMEAR  2023   • TDAP/TD VACCINES (2 - Td) 2030   • HEPATITIS C SCREENING  Completed   • Pneumococcal Vaccine 0-64  Completed   • MENINGOCOCCAL VACCINE  Aged Out       Past Medical History:   Diagnosis Date   • Anemia     Iron deficiency   • Diabetes mellitus (CMS/HCC)    • Fibroid    • History of transfusion    • Vitamin D deficiency disease        Past  Surgical History:   Procedure Laterality Date   •  SECTION      x2   • CHOLECYSTECTOMY     • COLONOSCOPY N/A 2016    Procedure: COLONOSCOPY w/ polypectomy;  Surgeon: Darlene Vargas MD;  Location: [facility];  Service:    • ENDOMETRIAL ABLATION     • LAPAROSCOPIC CHOLECYSTECTOMY     • TUBAL ABDOMINAL LIGATION           Current Outpatient Medications:   •  glucose blood test strip, USE TO TEST TWICE DAILY, Disp: , Rfl:   •  Lancets (BD Lancet Ultrafine 30G) misc, 1 each by Other route Daily. for testing blood sugar BID, Disp: 100 each, Rfl: 11  •  metFORMIN (GLUCOPHAGE) 1000 MG tablet, Take 1 tablet by mouth 2 (Two) Times a Day With Meals., Disp: 180 tablet, Rfl: 2  •  montelukast (SINGULAIR) 10 MG tablet, TAKE ONE TABLET BY MOUTH ONCE NIGHTLY, Disp: 30 tablet, Rfl: 4  •  norethindrone (MICRONOR) 0.35 MG tablet, Take 1 tablet by mouth Daily., Disp: 84 tablet, Rfl: 3  •  Semaglutide, 1 MG/DOSE, (Ozempic, 1 MG/DOSE,) 2 MG/1.5ML solution pen-injector, Inject 1 mg under the skin into the appropriate area as directed 1 (One) Time Per Week., Disp: 2 pen, Rfl: 4  •  True Metrix Blood Glucose Test test strip, USE 1 EACH TWICE DAILY, Disp: 200 each, Rfl: 3  •  vitamin B-12 (CYANOCOBALAMIN) 1000 MCG tablet, Place 1 tablet under the tongue daily., Disp: , Rfl:   •  vitamin D (ERGOCALCIFEROL) 1.25 MG (49642 UT) capsule capsule, TAKE 1 CAPSULE BY MOUTH 1 TIME EVERY WEEK, Disp: 12 capsule, Rfl: 1    No Known Allergies    Social History     Tobacco Use   • Smoking status: Never Smoker   • Smokeless tobacco: Never Used   Substance Use Topics   • Alcohol use: No   • Drug use: No       Family History   Problem Relation Age of Onset   • Diabetes Mother    • Hypertension Mother    • Colon cancer Father 70   • Diabetes Father    • Hypertension Father    • Colon polyps Father    • Prostate cancer Maternal Grandfather    • Diabetes Sister    • Diabetes Brother    • Diabetes Brother    • Breast cancer Neg Hx    • Ovarian  "cancer Neg Hx    • Uterine cancer Neg Hx    • Deep vein thrombosis Neg Hx    • Pulmonary embolism Neg Hx        Review of Systems   Constitutional: Positive for activity change. Negative for appetite change, fatigue, fever and unexpected weight change.   Eyes: Negative for photophobia and visual disturbance.   Respiratory: Negative for cough and shortness of breath.    Cardiovascular: Negative for chest pain and palpitations.   Gastrointestinal: Negative for abdominal distention, abdominal pain, constipation, diarrhea and nausea.   Endocrine: Negative for cold intolerance and heat intolerance.   Genitourinary: Negative for dyspareunia, dysuria, menstrual problem, pelvic pain, vaginal bleeding and vaginal discharge.   Musculoskeletal: Negative for back pain.   Skin: Negative for color change and rash.   Neurological: Negative for headaches.   Hematological: Negative for adenopathy. Does not bruise/bleed easily.   Psychiatric/Behavioral: Negative for dysphoric mood. The patient is not nervous/anxious.        /78   Ht 157.5 cm (62.01\")   Wt 120 kg (263 lb 12.8 oz)   LMP 03/26/2021 (Approximate)   Breastfeeding No   BMI 48.24 kg/m²     Physical Exam   Constitutional: She is oriented to person, place, and time. She appears well-developed.   HENT:   Head: Normocephalic and atraumatic.   Eyes: Conjunctivae are normal. No scleral icterus.   Neck: No thyromegaly present.   Cardiovascular: Normal rate and regular rhythm.   Pulmonary/Chest: Effort normal and breath sounds normal. Right breast exhibits no inverted nipple, no mass, no nipple discharge, no skin change and no tenderness. Left breast exhibits no inverted nipple, no mass, no nipple discharge, no skin change and no tenderness.   Abdominal: Soft. Normal appearance and bowel sounds are normal. She exhibits no distension and no mass. There is no abdominal tenderness. There is no rebound and no guarding. No hernia.   Genitourinary:    Rectum normal.      " Pelvic exam was performed with patient supine.   There is no rash, tenderness or lesion on the right labia. There is no rash, tenderness, lesion or injury on the left labia. Uterus is enlarged. Uterus is not deviated, not fixed and not tender. Cervix exhibits no motion tenderness, no discharge and no friability. Right adnexum displays no mass, no tenderness and no fullness. Left adnexum displays no mass, no tenderness and no fullness.    No vaginal discharge, erythema, tenderness or bleeding.   No erythema, tenderness or bleeding in the vagina.    No foreign body in the vagina.      No signs of injury in the vagina.      Genitourinary Comments: Exam limited due to habitus     Neurological: She is alert and oriented to person, place, and time.   Skin: Skin is warm and dry.   Psychiatric: Her behavior is normal. Mood, judgment and thought content normal.   Nursing note and vitals reviewed.         Assessment/Plan    1) GYN HM: normal pap/HPV 2/2020.   SBE demonstrated and encouraged.  2) STD screening: declines Condoms encouraged.  3) Contraception: tubal ligation  4) Family Planning: family planning: childbearing completed, encourage folic acid daily  5) Diet and Exercise discussed  6) Smoking Status: No  7) Menometrorrhagia- improved on POPs, refill Micronor.   8) MMG- UTD 11/2019- B1, schedule now  9) C scope UTD 12/2016- repeat 5 years. Refer LG for 12/2021.  10) R[facility]- will draw labs that Ms. Olivia CANDELARIO ordered   11) Parts of this document have been copied or forwarded from her previous visits and have been reviewed, updated and edited as indicated.   12)I saw the patient with a face mask, gloves and eye protection  The patient herself was masked.  Social distancing was observed as appropriate.  13) RTO 1 year annual or prn.          Diagnoses and all orders for this visit:    1. Routine gynecological examination (Primary)  -     TSH Rfx On Abnormal To Free T4  -     Comprehensive Metabolic Panel  -     Hemoglobin  A1c  -     Vitamin D 25 Hydroxy    2. Screening for condition  -     POC Urinalysis Dipstick  -     POC Pregnancy, Urine  -     Mammo Screening Bilateral With CAD; Future  -     Ambulatory Referral For Screening Colonoscopy    3. History of menorrhagia    4. Visit for oral contraceptive prescription    Other orders  -     Discontinue: norethindrone (MICRONOR) 0.35 MG tablet; Take 1 tablet by mouth Daily.  Dispense: 84 tablet; Refill: 3  -     norethindrone (MICRONOR) 0.35 MG tablet; Take 1 tablet by mouth Daily.  Dispense: 84 tablet; Refill: 3          Shae Gage MD  4/1/2021  09:54 EDT

## 2021-04-02 NOTE — PROGRESS NOTES
PIP= fasting glucose is elevated as is her HgBA1c. She needs to see her primary MD for further management. Her TSH is normal and her Vit D is still low.

## 2021-04-05 ENCOUNTER — TRANSCRIBE ORDERS (OUTPATIENT)
Dept: ADMINISTRATIVE | Facility: HOSPITAL | Age: 46
End: 2021-04-05

## 2021-04-05 DIAGNOSIS — Z12.31 ENCOUNTER FOR SCREENING MAMMOGRAM FOR MALIGNANT NEOPLASM OF BREAST: Primary | ICD-10-CM

## 2021-04-06 ENCOUNTER — OFFICE VISIT (OUTPATIENT)
Dept: INTERNAL MEDICINE | Facility: CLINIC | Age: 46
End: 2021-04-06

## 2021-04-06 VITALS
SYSTOLIC BLOOD PRESSURE: 130 MMHG | DIASTOLIC BLOOD PRESSURE: 88 MMHG | HEART RATE: 89 BPM | TEMPERATURE: 97.5 F | WEIGHT: 265.4 LBS | OXYGEN SATURATION: 97 % | HEIGHT: 62 IN | BODY MASS INDEX: 48.84 KG/M2

## 2021-04-06 DIAGNOSIS — I10 ESSENTIAL HYPERTENSION: ICD-10-CM

## 2021-04-06 DIAGNOSIS — B37.31 VAGINAL YEAST INFECTION: ICD-10-CM

## 2021-04-06 DIAGNOSIS — F41.9 ANXIETY: ICD-10-CM

## 2021-04-06 DIAGNOSIS — E66.01 CLASS 3 SEVERE OBESITY WITH SERIOUS COMORBIDITY AND BODY MASS INDEX (BMI) OF 50.0 TO 59.9 IN ADULT, UNSPECIFIED OBESITY TYPE (HCC): ICD-10-CM

## 2021-04-06 DIAGNOSIS — E78.1 HYPERTRIGLYCERIDEMIA: ICD-10-CM

## 2021-04-06 DIAGNOSIS — E55.9 VITAMIN D DEFICIENCY: ICD-10-CM

## 2021-04-06 DIAGNOSIS — E11.9 DIABETES MELLITUS TYPE 2, NONINSULIN DEPENDENT (HCC): Primary | ICD-10-CM

## 2021-04-06 PROCEDURE — 99214 OFFICE O/P EST MOD 30 MIN: CPT | Performed by: NURSE PRACTITIONER

## 2021-04-06 RX ORDER — ERGOCALCIFEROL 1.25 MG/1
50000 CAPSULE ORAL 2 TIMES WEEKLY
Qty: 24 CAPSULE | Refills: 2 | Status: SHIPPED | OUTPATIENT
Start: 2021-04-08 | End: 2021-11-18 | Stop reason: SDUPTHER

## 2021-04-06 RX ORDER — SEMAGLUTIDE 1.34 MG/ML
0.5 INJECTION, SOLUTION SUBCUTANEOUS WEEKLY
Qty: 1 PEN | Refills: 6 | Status: SHIPPED | OUTPATIENT
Start: 2021-04-06 | End: 2021-07-06

## 2021-04-06 RX ORDER — EMPAGLIFLOZIN 10 MG/1
1 TABLET, FILM COATED ORAL DAILY
Qty: 90 TABLET | Refills: 2 | Status: SHIPPED | OUTPATIENT
Start: 2021-04-06 | End: 2022-01-17

## 2021-04-06 RX ORDER — ALCOHOL ANTISEPTIC PADS
PADS, MEDICATED (EA) TOPICAL
COMMUNITY
Start: 2021-04-02 | End: 2022-04-07 | Stop reason: SDUPTHER

## 2021-04-06 RX ORDER — FLUCONAZOLE 150 MG/1
150 TABLET ORAL ONCE
Qty: 1 TABLET | Refills: 0 | Status: SHIPPED | OUTPATIENT
Start: 2021-04-06 | End: 2021-04-06

## 2021-04-06 NOTE — PROGRESS NOTES
Subjective   Candi Del Rio is a 45 y.o. female.      History of Present Illness   The patient is here today to F/U on lab work. She is feeling stressed. Caretaking parents post COVID. They are living with you. Up 6 lbs.     DM2- pt was decreased to 5 mg of jardiance due to urinary frequency, she stopped ozempic 2 months ago due to enlarged glands and trouble swallowing.   Have been off jardiance, never got a Rx for jardiance 5 mg     She does have some vagina itching.   The following portions of the patient's history were reviewed and updated as appropriate: allergies, current medications, past family history, past medical history, past social history, past surgical history and problem list.    Review of Systems   Constitutional: Negative for chills and fever.   Respiratory: Negative.    Cardiovascular: Negative.    Psychiatric/Behavioral: Positive for stress. Negative for dysphoric mood and suicidal ideas. The patient is not nervous/anxious.        Objective   Physical Exam  Constitutional:       Appearance: Normal appearance. She is well-developed. She is obese.   Neck:      Thyroid: No thyromegaly.   Cardiovascular:      Rate and Rhythm: Normal rate and regular rhythm.      Heart sounds: Normal heart sounds.   Pulmonary:      Effort: Pulmonary effort is normal.      Breath sounds: Normal breath sounds.   Musculoskeletal:      Cervical back: Normal range of motion and neck supple.   Lymphadenopathy:      Cervical: No cervical adenopathy.   Skin:     General: Skin is warm and dry.   Neurological:      Mental Status: She is alert.   Psychiatric:         Behavior: Behavior normal.         Thought Content: Thought content normal.         Judgment: Judgment normal.         Vitals:    04/06/21 1147   BP: 130/88   Pulse: 89   Temp: 97.5 °F (36.4 °C)   SpO2: 97%     Body mass index is 48.53 kg/m².      Assessment/Plan   Diagnoses and all orders for this visit:    1. Diabetes mellitus type 2, noninsulin dependent  (CMS/[facility]) (Primary)  -     Empagliflozin (Jardiance) 10 MG tablet; Take 10 mg by mouth Daily.  Dispense: 90 tablet; Refill: 2  -     CBC & Differential; Future  -     Comprehensive Metabolic Panel; Future  -     Hemoglobin A1c; Future  -     Lipid Panel With LDL / HDL Ratio; Future  -     Vitamin D 25 Hydroxy; Future    2. Anxiety    3. Vitamin D deficiency  -     vitamin D (ERGOCALCIFEROL) 1.25 MG (85907 UT) capsule capsule; Take 1 capsule by mouth 2 (Two) Times a Week.  Dispense: 24 capsule; Refill: 2    4. Vaginal yeast infection  -     fluconazole (DIFLUCAN) 150 MG tablet; Take 1 tablet by mouth 1 (One) Time for 1 dose.  Dispense: 1 tablet; Refill: 0    5. Essential hypertension  -     CBC & Differential; Future  -     Comprehensive Metabolic Panel; Future  -     Hemoglobin A1c; Future  -     Lipid Panel With LDL / HDL Ratio; Future  -     Vitamin D 25 Hydroxy; Future    6. Hypertriglyceridemia  -     CBC & Differential; Future  -     Comprehensive Metabolic Panel; Future  -     Hemoglobin A1c; Future  -     Lipid Panel With LDL / HDL Ratio; Future  -     Vitamin D 25 Hydroxy; Future    7. Class 3 severe obesity with serious comorbidity and body mass index (BMI) of 50.0 to 59.9 in adult, unspecified obesity type (CMS/[facility])  -     CBC & Differential; Future  -     Comprehensive Metabolic Panel; Future  -     Hemoglobin A1c; Future  -     Lipid Panel With LDL / HDL Ratio; Future  -     Vitamin D 25 Hydroxy; Future    Other orders  -     Semaglutide,0.25 or 0.5MG/DOS, (Ozempic, 0.25 or 0.5 MG/DOSE,) 2 MG/1.5ML solution pen-injector; Inject 0.5 mg under the skin into the appropriate area as directed 1 (One) Time Per Week.  Dispense: 1 pen; Refill: 6               1. DM2- ok to retry GLP1, if symptoms return with lymphadenopathy, pt will call and we will check US, restart jardiance 10 mg daily, try ozempic at lower doses only  2. Anxiety- given therapist hand out  3. Vit D def- increase to twice daily  4. Vaginal  yeast infection- treat with fluconazole    COVID vaccine discussed.

## 2021-04-13 ENCOUNTER — HOSPITAL ENCOUNTER (OUTPATIENT)
Dept: MAMMOGRAPHY | Facility: HOSPITAL | Age: 46
Discharge: HOME | End: 2021-04-13
Admitting: OBSTETRICS & GYNECOLOGY

## 2021-04-13 DIAGNOSIS — Z12.31 ENCOUNTER FOR SCREENING MAMMOGRAM FOR MALIGNANT NEOPLASM OF BREAST: ICD-10-CM

## 2021-04-13 PROCEDURE — 77063 BREAST TOMOSYNTHESIS BI: CPT

## 2021-04-13 PROCEDURE — 77067 SCR MAMMO BI INCL CAD: CPT

## 2021-06-14 DIAGNOSIS — E55.9 VITAMIN D DEFICIENCY: ICD-10-CM

## 2021-06-14 RX ORDER — ERGOCALCIFEROL 1.25 MG/1
CAPSULE ORAL
Qty: 12 CAPSULE | OUTPATIENT
Start: 2021-06-14

## 2021-06-29 LAB
25(OH)D3+25(OH)D2 SERPL-MCNC: 28.9 NG/ML (ref 30–100)
ALBUMIN SERPL-MCNC: 4 G/DL (ref 3.5–5.2)
ALBUMIN/GLOB SERPL: 1.6 G/DL
ALP SERPL-CCNC: 57 U/L (ref 39–117)
ALT SERPL-CCNC: 8 U/L (ref 1–33)
AST SERPL-CCNC: 10 U/L (ref 1–32)
BILIRUB SERPL-MCNC: 0.3 MG/DL (ref 0–1.2)
BUN SERPL-MCNC: 10 MG/DL (ref 6–20)
BUN/CREAT SERPL: 14.1 (ref 7–25)
CALCIUM SERPL-MCNC: 8.9 MG/DL (ref 8.6–10.5)
CHLORIDE SERPL-SCNC: 105 MMOL/L (ref 98–107)
CO2 SERPL-SCNC: 25.7 MMOL/L (ref 22–29)
CREAT SERPL-MCNC: 0.71 MG/DL (ref 0.57–1)
GLOBULIN SER CALC-MCNC: 2.5 GM/DL
GLUCOSE SERPL-MCNC: 222 MG/DL (ref 65–99)
HBA1C MFR BLD: 8.1 % (ref 4.8–5.6)
POTASSIUM SERPL-SCNC: 4.4 MMOL/L (ref 3.5–5.2)
PROT SERPL-MCNC: 6.5 G/DL (ref 6–8.5)
SODIUM SERPL-SCNC: 138 MMOL/L (ref 136–145)
TSH SERPL DL<=0.005 MIU/L-ACNC: 1.58 UIU/ML (ref 0.27–4.2)

## 2021-06-30 DIAGNOSIS — E11.9 DIABETES MELLITUS TYPE 2, NONINSULIN DEPENDENT (HCC): ICD-10-CM

## 2021-07-06 ENCOUNTER — OFFICE VISIT (OUTPATIENT)
Dept: INTERNAL MEDICINE | Facility: CLINIC | Age: 46
End: 2021-07-06

## 2021-07-06 VITALS
HEIGHT: 62 IN | TEMPERATURE: 97 F | BODY MASS INDEX: 47.84 KG/M2 | WEIGHT: 260 LBS | HEART RATE: 86 BPM | SYSTOLIC BLOOD PRESSURE: 124 MMHG | OXYGEN SATURATION: 98 % | DIASTOLIC BLOOD PRESSURE: 82 MMHG

## 2021-07-06 DIAGNOSIS — E11.9 DIABETES MELLITUS TYPE 2, NONINSULIN DEPENDENT (HCC): ICD-10-CM

## 2021-07-06 DIAGNOSIS — Z00.00 HEALTH CARE MAINTENANCE: Primary | ICD-10-CM

## 2021-07-06 DIAGNOSIS — E55.9 VITAMIN D DEFICIENCY: ICD-10-CM

## 2021-07-06 DIAGNOSIS — E66.01 CLASS 3 SEVERE OBESITY WITH SERIOUS COMORBIDITY AND BODY MASS INDEX (BMI) OF 50.0 TO 59.9 IN ADULT, UNSPECIFIED OBESITY TYPE (HCC): ICD-10-CM

## 2021-07-06 PROBLEM — I44.0 1ST DEGREE AV BLOCK: Status: ACTIVE | Noted: 2021-07-06

## 2021-07-06 PROCEDURE — 99396 PREV VISIT EST AGE 40-64: CPT | Performed by: NURSE PRACTITIONER

## 2021-07-06 PROCEDURE — 93000 ELECTROCARDIOGRAM COMPLETE: CPT | Performed by: NURSE PRACTITIONER

## 2021-07-06 RX ORDER — SEMAGLUTIDE 1.34 MG/ML
1 INJECTION, SOLUTION SUBCUTANEOUS WEEKLY
Qty: 2 PEN | Refills: 5 | Status: SHIPPED | OUTPATIENT
Start: 2021-07-06 | End: 2021-08-09

## 2021-07-06 NOTE — PROGRESS NOTES
Subjective   Candi Del Rio is a 45 y.o. female.     History of Present Illness   The patient is here today for CPE and lab work F/U. She is down some with the keto diet, but stopped due to increase in blood sugar.     DM2- at last visit restarted GLP1 and jardiance. She has been out of metformin for 3 weeks, feels the ozempic is not as powerful with lowering appetite currently.   She is hydrating well.   The following portions of the patient's history were reviewed and updated as appropriate: allergies, current medications, past family history, past medical history, past social history, past surgical history and problem list.    Review of Systems   Constitutional: Negative for chills, fatigue and fever.   HENT: Negative for ear pain, rhinorrhea and sore throat.    Eyes: Negative.    Respiratory: Negative for cough and shortness of breath.    Cardiovascular: Negative.    Gastrointestinal: Negative.    Endocrine: Negative for cold intolerance and heat intolerance.   Genitourinary: Negative for breast discharge, breast lump, breast pain, difficulty urinating, dyspareunia, dysuria, flank pain, frequency, genital sores, hematuria, pelvic pain and urgency.   Musculoskeletal: Negative.    Skin: Negative.    Allergic/Immunologic: Negative for environmental allergies and food allergies.   Neurological: Negative.    Hematological: Negative.    Psychiatric/Behavioral: Negative for dysphoric mood and suicidal ideas. The patient is not nervous/anxious.        Objective   Physical Exam  Constitutional:       Appearance: Normal appearance. She is well-developed.      Comments: Body mass index is 47.54 kg/m².     HENT:      Right Ear: Hearing, tympanic membrane, ear canal and external ear normal.      Left Ear: Hearing, tympanic membrane, ear canal and external ear normal.   Eyes:      General: Lids are normal.      Conjunctiva/sclera: Conjunctivae normal.      Pupils: Pupils are equal, round, and reactive to light.   Neck:       Thyroid: No thyroid mass or thyromegaly.      Vascular: No carotid bruit.      Trachea: Trachea normal.   Cardiovascular:      Rate and Rhythm: Normal rate and regular rhythm.      Pulses: Normal pulses.      Heart sounds: Normal heart sounds.   Pulmonary:      Effort: Pulmonary effort is normal.      Breath sounds: Normal breath sounds.   Abdominal:      General: Bowel sounds are normal.      Palpations: Abdomen is soft.      Tenderness: There is no abdominal tenderness.   Musculoskeletal:         General: Normal range of motion.      Cervical back: Normal range of motion.   Lymphadenopathy:      Cervical: No cervical adenopathy.      Upper Body:      Right upper body: No supraclavicular adenopathy.      Left upper body: No supraclavicular adenopathy.      Lower Body: No right inguinal adenopathy. No left inguinal adenopathy.   Skin:     General: Skin is warm and dry.   Neurological:      Mental Status: She is alert and oriented to person, place, and time.      GCS: GCS eye subscore is 4. GCS verbal subscore is 5. GCS motor subscore is 6.      Cranial Nerves: No cranial nerve deficit.      Sensory: No sensory deficit.      Deep Tendon Reflexes:      Reflex Scores:       Patellar reflexes are 2+ on the right side and 2+ on the left side.  Psychiatric:         Speech: Speech normal.         Behavior: Behavior normal. Behavior is cooperative.         Thought Content: Thought content normal.         Judgment: Judgment normal.         Vitals:    07/06/21 1126   BP: 124/82   Pulse: 86   Temp: 97 °F (36.1 °C)   SpO2: 98%     Body mass index is 47.54 kg/m².      Assessment/Plan   Diagnoses and all orders for this visit:    1. Health care maintenance (Primary)  -     Comprehensive Metabolic Panel; Future  -     Hemoglobin A1c; Future  -     Lipid Panel With LDL / HDL Ratio; Future  -     Vitamin D 25 Hydroxy; Future    2. Diabetes mellitus type 2, noninsulin dependent (CMS/HCC)  -     metFORMIN (GLUCOPHAGE) 1000 MG tablet;  Take 1 tablet by mouth 2 (Two) Times a Day With Meals.  Dispense: 180 tablet; Refill: 1  -     Comprehensive Metabolic Panel; Future  -     Hemoglobin A1c; Future  -     Lipid Panel With LDL / HDL Ratio; Future  -     Vitamin D 25 Hydroxy; Future    3. Class 3 severe obesity with serious comorbidity and body mass index (BMI) of 50.0 to 59.9 in adult, unspecified obesity type (CMS/HCC)  -     Comprehensive Metabolic Panel; Future  -     Hemoglobin A1c; Future  -     Lipid Panel With LDL / HDL Ratio; Future  -     Vitamin D 25 Hydroxy; Future    4. Vitamin D deficiency  -     Comprehensive Metabolic Panel; Future  -     Hemoglobin A1c; Future  -     Lipid Panel With LDL / HDL Ratio; Future  -     Vitamin D 25 Hydroxy; Future    Other orders  -     Semaglutide, 1 MG/DOSE, (Ozempic, 1 MG/DOSE,) 2 MG/1.5ML solution pen-injector; Inject 1 mg under the skin into the appropriate area as directed 1 (One) Time Per Week.  Dispense: 2 pen; Refill: 5        HCM/EKG- NSR with 1st degree AV block, no change from baseline         1. Preventive Counseling- Nathaniel focus on lean protein and low carb diet and regular exercise.   2. DM2- increase ozempic to 1 mg weekly, work on beach body diet, needs statin, pt defers  3. Vit D def- take Vit D twice weekly     GYN- UTD

## 2021-07-26 ENCOUNTER — OFFICE VISIT (OUTPATIENT)
Dept: INTERNAL MEDICINE | Facility: CLINIC | Age: 46
End: 2021-07-26

## 2021-07-26 VITALS
DIASTOLIC BLOOD PRESSURE: 62 MMHG | TEMPERATURE: 97.8 F | HEIGHT: 62 IN | WEIGHT: 257 LBS | BODY MASS INDEX: 47.29 KG/M2 | OXYGEN SATURATION: 99 % | SYSTOLIC BLOOD PRESSURE: 112 MMHG | HEART RATE: 95 BPM

## 2021-07-26 DIAGNOSIS — I48.0 PAROXYSMAL ATRIAL FIBRILLATION (HCC): ICD-10-CM

## 2021-07-26 DIAGNOSIS — G40.909 NONINTRACTABLE EPILEPSY WITHOUT STATUS EPILEPTICUS, UNSPECIFIED EPILEPSY TYPE (HCC): Primary | ICD-10-CM

## 2021-07-26 PROCEDURE — 99214 OFFICE O/P EST MOD 30 MIN: CPT | Performed by: NURSE PRACTITIONER

## 2021-07-26 NOTE — PROGRESS NOTES
Subjective   Candi Del Rio is a 45 y.o. female.      History of Present Illness   The patient is here today to F/U on syncopal episode while on vacation. She did go to the hospital. She reports she actually was feeling really well prior to this episode and is still trying to drink a gallon of water daily.   She woke up that morning and did water aerobics. She came back to the the hotel to rest and eat. She laid down to watch a tv show. She remembers laying down. The next thing she knew she was on the floor and her lip was busted. This was not witnessed. She looked and saw that she had been asleep for maybe 18 minutes. She had bit all the way through her lip and had explosive diarrhea. This was 7/16th, called EMS, they said she had an irregular heart beat. They told her to go to [facility].   When she went to the hospital they wanted to keep her and she decided to leave because she was nervous about being out of state.   She was told she was in A-fib by the EMT and hospital.     Lab work and CT head neg.     The following portions of the patient's history were reviewed and updated as appropriate: allergies, current medications, past family history, past medical history, past social history, past surgical history and problem list.    Review of Systems   Constitutional: Negative for chills and fever.   Eyes: Negative.    Respiratory: Negative.    Cardiovascular: Positive for palpitations. Negative for chest pain.   Neurological: Positive for seizures and syncope. Negative for dizziness, tremors, facial asymmetry, speech difficulty, weakness, light-headedness, numbness, headache, memory problem and confusion.   Psychiatric/Behavioral: Positive for stress. Negative for dysphoric mood and suicidal ideas. The patient is nervous/anxious.        Objective   Physical Exam  Constitutional:       Appearance: Normal appearance. She is well-developed.   Neck:      Thyroid: No thyromegaly.   Cardiovascular:      Rate and Rhythm: Normal  rate and regular rhythm.      Heart sounds: Normal heart sounds.   Pulmonary:      Effort: Pulmonary effort is normal.      Breath sounds: Normal breath sounds.   Musculoskeletal:      Cervical back: Normal range of motion and neck supple.   Lymphadenopathy:      Cervical: No cervical adenopathy.   Skin:     General: Skin is warm and dry.   Neurological:      Mental Status: She is alert.      Cranial Nerves: Cranial nerves are intact.      Sensory: Sensation is intact.      Motor: Motor function is intact.      Coordination: Coordination is intact.      Gait: Gait is intact.      Deep Tendon Reflexes:      Reflex Scores:       Patellar reflexes are 1+ on the right side and 1+ on the left side.  Psychiatric:         Behavior: Behavior normal.         Thought Content: Thought content normal.         Judgment: Judgment normal.         Vitals:    07/26/21 1014   BP: 112/62   Pulse: 95   Temp: 97.8 °F (36.6 °C)   SpO2: 99%     Body mass index is 47.29 kg/m².      Assessment/Plan   Diagnoses and all orders for this visit:    1. Nonintractable epilepsy without status epilepticus, unspecified epilepsy type (CMS/HCC) (Primary)  -     Ambulatory Referral to Neurology    2. Paroxysmal atrial fibrillation (CMS/HCC)  -     Ambulatory Referral to Cardiology               1. Epilepsy- ? Vs syncopal episode, not typical due to incontinence and biting of her lip. She needs to f/u with neurology   No driving until cleared by neurologist  2. A-fib- ? Will request EKG from hospital, start baby ASA, watch bleeding

## 2021-07-26 NOTE — PATIENT INSTRUCTIONS
1. Epilepsy- ? Vs syncopal episode, not typical due to incontinence and biting of her lip. She needs to f/u with neurology   No driving until cleared by neurologist  2. A-fib- ? Will request EKG from hospital, start baby ASA, watch bleeding

## 2021-07-30 ENCOUNTER — OFFICE VISIT (OUTPATIENT)
Dept: CARDIOLOGY | Facility: CLINIC | Age: 46
End: 2021-07-30

## 2021-07-30 VITALS
DIASTOLIC BLOOD PRESSURE: 88 MMHG | SYSTOLIC BLOOD PRESSURE: 138 MMHG | HEART RATE: 66 BPM | WEIGHT: 258 LBS | HEIGHT: 61 IN | BODY MASS INDEX: 48.71 KG/M2 | OXYGEN SATURATION: 99 %

## 2021-07-30 DIAGNOSIS — R55 SYNCOPE AND COLLAPSE: ICD-10-CM

## 2021-07-30 DIAGNOSIS — I10 ESSENTIAL HYPERTENSION: Primary | ICD-10-CM

## 2021-07-30 PROCEDURE — 93000 ELECTROCARDIOGRAM COMPLETE: CPT | Performed by: INTERNAL MEDICINE

## 2021-07-30 PROCEDURE — 99204 OFFICE O/P NEW MOD 45 MIN: CPT | Performed by: INTERNAL MEDICINE

## 2021-07-30 NOTE — PROGRESS NOTES
CARDIOLOGY    Ulices Eden MD    ENCOUNTER DATE:  07/30/2021    Candi Del Rio / 45 y.o. / female        CHIEF COMPLAINT / REASON FOR OFFICE VISIT   Syncope    HISTORY OF PRESENT ILLNESS       HPI  Candi Del Rio is a 45 y.o. female who presents today for evaluation.  Patient was in [facility] when she had a syncopal episode.  She does not remember much about it she said that she was vacationing she went to exercise 1 more day and did water aerobics.  Since she came back to the room was actually feeling pretty good.  She says she remembers laying down in the next thing she knew she was lying on the floor somewhere between the kitchen and the bathroom.  When patient passed out she did busted her lip open but ultimately required stitches.  She thinks she was up about 15 minutes by her watch.  There were other people in the room with her but they were asleep and did not wake up and so the exact timeframe is really not known.  Patient did have explosive diarrhea as soon as she woke up.  She said for about 2 to 3 minutes it just kept coming out of her.  She called EMS they checked her out did not transport her to the hospital her  came back later and subsequently asked why took her to an urgent care center got her lip sewed up and then ultimately was taken to the hospital.  There was a concern that she may have had atrial fibrillation however the records for this was not available.  She said her PCP had scanned into the chart but it was not available when I saw the patient.  She said when she flew home on the airplane she kept having intermittent episodes of just feeling clammy.      The following portions of the patient's history were reviewed and updated as appropriate: allergies, current medications, past family history, past medical history, past social history, past surgical history and problem list.      VITAL SIGNS     Visit Vitals  /88 (BP Location: Left arm)   Pulse 66   Ht 154.9  "cm (61\")   Wt 117 kg (258 lb)   SpO2 99%   BMI 48.75 kg/m²         Wt Readings from Last 3 Encounters:   07/30/21 117 kg (258 lb)   07/26/21 117 kg (257 lb)   07/06/21 118 kg (260 lb)     Body mass index is 48.75 kg/m².      REVIEW OF SYSTEMS   Review of Systems   All other systems reviewed and are negative.          PHYSICAL EXAMINATION     Vitals reviewed.   Constitutional:       Appearance: Well-developed.   Eyes:      Conjunctiva/sclera: Conjunctivae normal.   HENT:      Head: Normocephalic.   Pulmonary:      Breath sounds: Normal breath sounds.   Cardiovascular:      Normal rate. Regular rhythm.   Edema:     Peripheral edema absent.   Abdominal:      General: Bowel sounds are normal.      Palpations: Abdomen is soft.   Musculoskeletal: Normal range of motion.      Cervical back: Normal range of motion. Skin:     General: Skin is warm and dry.   Neurological:      Mental Status: Alert and oriented to person, place, and time.   Psychiatric:         Behavior: Behavior normal.           REVIEWED DATA       ECG 12 Lead    Date/Time: 7/30/2021 2:19 PM  Performed by: Ulices Eden MD  Authorized by: Ulices Eden MD   Previous ECG: no previous ECG available  Rhythm: sinus bradycardia    Clinical impression: non-specific ECG            Cardiac Procedures:  1.     Lipid Panel    Lipid Panel 10/29/20   Total Cholesterol 160   Triglycerides 115   HDL Cholesterol 52   VLDL Cholesterol 21   LDL Cholesterol  87   LDL/HDL Ratio 1.63               ASSESSMENT & PLAN      Diagnosis Plan   1. Essential hypertension  Adult Transthoracic Echo Complete W/ Cont if Necessary Per Protocol    Holter Monitor - 24 Hour         SUMMARY/DISCUSSION  1. Syncope patient history was very piecemeal.  There was think she said it added as time went by and the story involved.  I think however she could have had a vasovagal event especially since clearly she had dumping syndrome.  She said she never had anything like that before.  She is " also getting the clamminess on the airplane ride back home.  I do not know she got some type of viral illness or anything like that.  She had received her Covid vaccine about a week or so prior to this event and said she really did not have any issues with it at that time.  What is not consistent with a vasovagal event is she busted her lip open requiring stitches.  Still she had the significant dumping which could have a elicitation the vagal event.  From a cardiovascular standpoint I will set her up for a Holter and an echo.  There is also a question about atrial fibrillation which I could not find a documented but if she had an extreme vasovagal event we can have vagal mediated atrial fibrillation which be associated with that.  I would say if her echo and her Holter are unremarkable I would follow her clinically and see what evolves.        MEDICATIONS         Discharge Medications          Accurate as of July 30, 2021  2:16 PM. If you have any questions, ask your nurse or doctor.            Continue These Medications      Instructions Start Date   Jardiance 10 MG tablet tablet  Generic drug: empagliflozin   10 mg, Oral, Daily      metFORMIN 1000 MG tablet  Commonly known as: GLUCOPHAGE   1,000 mg, Oral, 2 Times Daily With Meals      montelukast 10 MG tablet  Commonly known as: SINGULAIR   TAKE ONE TABLET BY MOUTH ONCE NIGHTLY      Ozempic (1 MG/DOSE) 2 MG/1.5ML solution pen-injector  Generic drug: Semaglutide (1 MG/DOSE)   1 mg, Subcutaneous, Weekly      True Metrix Blood Glucose Test test strip  Generic drug: glucose blood   USE 1 EACH TWICE DAILY      Ultra Thin Lancets 31G misc   No dose, route, or frequency recorded.      vitamin B-12 1000 MCG tablet  Commonly known as: CYANOCOBALAMIN   1 tablet, Sublingual, Daily      vitamin D 1.25 MG (57484 UT) capsule capsule  Commonly known as: ERGOCALCIFEROL   50,000 Units, Oral, 2 Times Weekly                 **Dragon Disclaimer:   Much of this encounter note is an  electronic transcription/translation of spoken language to printed text. The electronic translation of spoken language may permit erroneous, or at times, nonsensical words or phrases to be inadvertently transcribed. Although I have reviewed the note for such errors, some may still exist.

## 2021-08-03 ENCOUNTER — HOSPITAL ENCOUNTER (OUTPATIENT)
Dept: CARDIOLOGY | Facility: HOSPITAL | Age: 46
Discharge: HOME | End: 2021-08-03
Admitting: INTERNAL MEDICINE

## 2021-08-03 VITALS
DIASTOLIC BLOOD PRESSURE: 80 MMHG | BODY MASS INDEX: 48.71 KG/M2 | HEART RATE: 81 BPM | HEIGHT: 61 IN | WEIGHT: 258 LBS | SYSTOLIC BLOOD PRESSURE: 130 MMHG | OXYGEN SATURATION: 99 %

## 2021-08-03 DIAGNOSIS — I10 ESSENTIAL HYPERTENSION: ICD-10-CM

## 2021-08-03 LAB
AORTIC ARCH: 2.5 CM
ASCENDING AORTA: 2.6 CM
BH CV ECHO MEAS - ACS: 2 CM
BH CV ECHO MEAS - AO MAX PG (FULL): 3.5 MMHG
BH CV ECHO MEAS - AO MAX PG: 7.5 MMHG
BH CV ECHO MEAS - AO MEAN PG (FULL): 2.5 MMHG
BH CV ECHO MEAS - AO MEAN PG: 4.8 MMHG
BH CV ECHO MEAS - AO ROOT AREA (BSA CORRECTED): 1.3
BH CV ECHO MEAS - AO ROOT AREA: 6.3 CM^2
BH CV ECHO MEAS - AO ROOT DIAM: 2.8 CM
BH CV ECHO MEAS - AO V2 MAX: 137.1 CM/SEC
BH CV ECHO MEAS - AO V2 MEAN: 102.9 CM/SEC
BH CV ECHO MEAS - AO V2 VTI: 29.6 CM
BH CV ECHO MEAS - ASC AORTA: 2.6 CM
BH CV ECHO MEAS - AVA(I,A): 2.5 CM^2
BH CV ECHO MEAS - AVA(I,D): 2.5 CM^2
BH CV ECHO MEAS - AVA(V,A): 2.5 CM^2
BH CV ECHO MEAS - AVA(V,D): 2.5 CM^2
BH CV ECHO MEAS - BSA(HAYCOCK): 2.3 M^2
BH CV ECHO MEAS - BSA: 2.1 M^2
BH CV ECHO MEAS - BZI_BMI: 48.7 KILOGRAMS/M^2
BH CV ECHO MEAS - BZI_METRIC_HEIGHT: 154.9 CM
BH CV ECHO MEAS - BZI_METRIC_WEIGHT: 117 KG
BH CV ECHO MEAS - EDV(MOD-SP2): 78 ML
BH CV ECHO MEAS - EDV(MOD-SP4): 73 ML
BH CV ECHO MEAS - EDV(TEICH): 70.9 ML
BH CV ECHO MEAS - EF(CUBED): 76 %
BH CV ECHO MEAS - EF(MOD-BP): 55.8 %
BH CV ECHO MEAS - EF(MOD-SP2): 60.3 %
BH CV ECHO MEAS - EF(MOD-SP4): 50.7 %
BH CV ECHO MEAS - EF(TEICH): 68.6 %
BH CV ECHO MEAS - ESV(MOD-SP2): 31 ML
BH CV ECHO MEAS - ESV(MOD-SP4): 36 ML
BH CV ECHO MEAS - ESV(TEICH): 22.3 ML
BH CV ECHO MEAS - FS: 37.9 %
BH CV ECHO MEAS - IVS/LVPW: 0.9
BH CV ECHO MEAS - IVSD: 0.95 CM
BH CV ECHO MEAS - LAT PEAK E' VEL: 11.9 CM/SEC
BH CV ECHO MEAS - LV DIASTOLIC VOL/BSA (35-75): 34.7 ML/M^2
BH CV ECHO MEAS - LV MASS(C)D: 128.1 GRAMS
BH CV ECHO MEAS - LV MASS(C)DI: 60.9 GRAMS/M^2
BH CV ECHO MEAS - LV MAX PG: 4 MMHG
BH CV ECHO MEAS - LV MEAN PG: 2.3 MMHG
BH CV ECHO MEAS - LV SYSTOLIC VOL/BSA (12-30): 17.1 ML/M^2
BH CV ECHO MEAS - LV V1 MAX: 99.9 CM/SEC
BH CV ECHO MEAS - LV V1 MEAN: 71.4 CM/SEC
BH CV ECHO MEAS - LV V1 VTI: 21.7 CM
BH CV ECHO MEAS - LVIDD: 4 CM
BH CV ECHO MEAS - LVIDS: 2.5 CM
BH CV ECHO MEAS - LVLD AP2: 7.9 CM
BH CV ECHO MEAS - LVLD AP4: 7.2 CM
BH CV ECHO MEAS - LVLS AP2: 5.4 CM
BH CV ECHO MEAS - LVLS AP4: 6.2 CM
BH CV ECHO MEAS - LVOT AREA (M): 3.5 CM^2
BH CV ECHO MEAS - LVOT AREA: 3.4 CM^2
BH CV ECHO MEAS - LVOT DIAM: 2.1 CM
BH CV ECHO MEAS - LVPWD: 1.1 CM
BH CV ECHO MEAS - MED PEAK E' VEL: 8.1 CM/SEC
BH CV ECHO MEAS - MV A DUR: 0.11 SEC
BH CV ECHO MEAS - MV A MAX VEL: 59.5 CM/SEC
BH CV ECHO MEAS - MV DEC SLOPE: 476.3 CM/SEC^2
BH CV ECHO MEAS - MV DEC TIME: 0.2 SEC
BH CV ECHO MEAS - MV E MAX VEL: 85.9 CM/SEC
BH CV ECHO MEAS - MV E/A: 1.4
BH CV ECHO MEAS - MV MAX PG: 4.6 MMHG
BH CV ECHO MEAS - MV MEAN PG: 2.2 MMHG
BH CV ECHO MEAS - MV P1/2T MAX VEL: 97 CM/SEC
BH CV ECHO MEAS - MV P1/2T: 59.6 MSEC
BH CV ECHO MEAS - MV V2 MAX: 107.6 CM/SEC
BH CV ECHO MEAS - MV V2 MEAN: 71.3 CM/SEC
BH CV ECHO MEAS - MV V2 VTI: 25.2 CM
BH CV ECHO MEAS - MVA P1/2T LCG: 2.3 CM^2
BH CV ECHO MEAS - MVA(P1/2T): 3.7 CM^2
BH CV ECHO MEAS - MVA(VTI): 2.9 CM^2
BH CV ECHO MEAS - PA ACC TIME: 0.07 SEC
BH CV ECHO MEAS - PA MAX PG (FULL): 4.4 MMHG
BH CV ECHO MEAS - PA MAX PG: 5.5 MMHG
BH CV ECHO MEAS - PA PR(ACCEL): 46.9 MMHG
BH CV ECHO MEAS - PA V2 MAX: 116.7 CM/SEC
BH CV ECHO MEAS - PAPD(PI EDV): 3.8 MMHG
BH CV ECHO MEAS - PI END-D VEL: 97.7 CM/SEC
BH CV ECHO MEAS - PULM A REVS DUR: 0.08 SEC
BH CV ECHO MEAS - PULM A REVS VEL: 27.7 CM/SEC
BH CV ECHO MEAS - PULM DIAS VEL: 39.1 CM/SEC
BH CV ECHO MEAS - PULM S/D: 0.99
BH CV ECHO MEAS - PULM SYS VEL: 38.6 CM/SEC
BH CV ECHO MEAS - PV RVPEPC: 11.2 SEC
BH CV ECHO MEAS - PVA(V,A): 1.6 CM^2
BH CV ECHO MEAS - PVA(V,D): 1.6 CM^2
BH CV ECHO MEAS - QP/QS: 0.6
BH CV ECHO MEAS - RAP SYSTOLE: 3 MMHG
BH CV ECHO MEAS - RV MAX PG: 1.1 MMHG
BH CV ECHO MEAS - RV MEAN PG: 0.71 MMHG
BH CV ECHO MEAS - RV V1 MAX: 51.8 CM/SEC
BH CV ECHO MEAS - RV V1 MEAN: 40.4 CM/SEC
BH CV ECHO MEAS - RV V1 VTI: 12.1 CM
BH CV ECHO MEAS - RVOT AREA: 3.7 CM^2
BH CV ECHO MEAS - RVOT DIAM: 2.2 CM
BH CV ECHO MEAS - RVSP: 18 MMHG
BH CV ECHO MEAS - SI(AO): 88.1 ML/M^2
BH CV ECHO MEAS - SI(CUBED): 23.5 ML/M^2
BH CV ECHO MEAS - SI(LVOT): 35.2 ML/M^2
BH CV ECHO MEAS - SI(MOD-SP2): 22.3 ML/M^2
BH CV ECHO MEAS - SI(MOD-SP4): 17.6 ML/M^2
BH CV ECHO MEAS - SI(TEICH): 23.1 ML/M^2
BH CV ECHO MEAS - SUP REN AO DIAM: 1.6 CM
BH CV ECHO MEAS - SV(AO): 185.5 ML
BH CV ECHO MEAS - SV(CUBED): 49.5 ML
BH CV ECHO MEAS - SV(LVOT): 74.1 ML
BH CV ECHO MEAS - SV(MOD-SP2): 47 ML
BH CV ECHO MEAS - SV(MOD-SP4): 37 ML
BH CV ECHO MEAS - SV(RVOT): 44.2 ML
BH CV ECHO MEAS - SV(TEICH): 48.6 ML
BH CV ECHO MEAS - TAPSE (>1.6): 2.4 CM
BH CV ECHO MEAS - TR MAX VEL: 194.1 CM/SEC
BH CV ECHO MEASUREMENTS AVERAGE E/E' RATIO: 8.59
BH CV VAS BP RIGHT ARM: NORMAL MMHG
BH CV XLRA - RV BASE: 3.1 CM
BH CV XLRA - RV LENGTH: 7.1 CM
BH CV XLRA - RV MID: 2.7 CM
BH CV XLRA - TDI S': 11.2 CM/SEC
LEFT ATRIUM VOLUME INDEX: 20 ML/M2
LV EF 2D ECHO EST: 56 %
MAXIMAL PREDICTED HEART RATE: 175 BPM
SINUS: 2.7 CM
STJ: 2.7 CM
STRESS TARGET HR: 149 BPM

## 2021-08-03 PROCEDURE — 93306 TTE W/DOPPLER COMPLETE: CPT | Performed by: INTERNAL MEDICINE

## 2021-08-03 PROCEDURE — 93306 TTE W/DOPPLER COMPLETE: CPT

## 2021-08-05 ENCOUNTER — APPOINTMENT (OUTPATIENT)
Dept: CT IMAGING | Facility: HOSPITAL | Age: 46
End: 2021-08-05

## 2021-08-05 ENCOUNTER — TELEPHONE (OUTPATIENT)
Dept: INTERNAL MEDICINE | Facility: CLINIC | Age: 46
End: 2021-08-05

## 2021-08-05 ENCOUNTER — HOSPITAL ENCOUNTER (EMERGENCY)
Facility: HOSPITAL | Age: 46
Discharge: HOME | End: 2021-08-05
Attending: EMERGENCY MEDICINE | Admitting: EMERGENCY MEDICINE

## 2021-08-05 VITALS
HEART RATE: 71 BPM | DIASTOLIC BLOOD PRESSURE: 71 MMHG | HEIGHT: 62 IN | WEIGHT: 267.4 LBS | BODY MASS INDEX: 49.21 KG/M2 | TEMPERATURE: 98.3 F | OXYGEN SATURATION: 96 % | RESPIRATION RATE: 16 BRPM | SYSTOLIC BLOOD PRESSURE: 128 MMHG

## 2021-08-05 DIAGNOSIS — R10.9 ABDOMINAL PAIN, UNSPECIFIED ABDOMINAL LOCATION: ICD-10-CM

## 2021-08-05 DIAGNOSIS — R16.0 LIVER MASS: Primary | ICD-10-CM

## 2021-08-05 DIAGNOSIS — N39.0 ACUTE UTI: ICD-10-CM

## 2021-08-05 LAB
ALBUMIN SERPL-MCNC: 3.8 G/DL (ref 3.5–5.2)
ALBUMIN/GLOB SERPL: 1.2 G/DL
ALP SERPL-CCNC: 60 U/L (ref 39–117)
ALT SERPL W P-5'-P-CCNC: 11 U/L (ref 1–33)
ANION GAP SERPL CALCULATED.3IONS-SCNC: 9.4 MMOL/L (ref 5–15)
AST SERPL-CCNC: 12 U/L (ref 1–32)
BACTERIA UR QL AUTO: ABNORMAL /HPF
BASOPHILS # BLD AUTO: 0.03 10*3/MM3 (ref 0–0.2)
BASOPHILS NFR BLD AUTO: 0.5 % (ref 0–1.5)
BILIRUB SERPL-MCNC: 0.3 MG/DL (ref 0–1.2)
BILIRUB UR QL STRIP: NEGATIVE
BUN SERPL-MCNC: 12 MG/DL (ref 6–20)
BUN/CREAT SERPL: 13.6 (ref 7–25)
CALCIUM SPEC-SCNC: 9.5 MG/DL (ref 8.6–10.5)
CHLORIDE SERPL-SCNC: 99 MMOL/L (ref 98–107)
CLARITY UR: CLEAR
CO2 SERPL-SCNC: 24.6 MMOL/L (ref 22–29)
COLOR UR: YELLOW
CREAT SERPL-MCNC: 0.88 MG/DL (ref 0.57–1)
DEPRECATED RDW RBC AUTO: 42.5 FL (ref 37–54)
EOSINOPHIL # BLD AUTO: 0.15 10*3/MM3 (ref 0–0.4)
EOSINOPHIL NFR BLD AUTO: 2.4 % (ref 0.3–6.2)
ERYTHROCYTE [DISTWIDTH] IN BLOOD BY AUTOMATED COUNT: 13.2 % (ref 12.3–15.4)
GFR SERPL CREATININE-BSD FRML MDRD: 84 ML/MIN/1.73
GLOBULIN UR ELPH-MCNC: 3.1 GM/DL
GLUCOSE SERPL-MCNC: 238 MG/DL (ref 65–99)
GLUCOSE UR STRIP-MCNC: ABNORMAL MG/DL
HCT VFR BLD AUTO: 36.8 % (ref 34–46.6)
HGB BLD-MCNC: 11.8 G/DL (ref 12–15.9)
HGB UR QL STRIP.AUTO: ABNORMAL
HYALINE CASTS UR QL AUTO: ABNORMAL /LPF
IMM GRANULOCYTES # BLD AUTO: 0.01 10*3/MM3 (ref 0–0.05)
IMM GRANULOCYTES NFR BLD AUTO: 0.2 % (ref 0–0.5)
KETONES UR QL STRIP: ABNORMAL
LEUKOCYTE ESTERASE UR QL STRIP.AUTO: NEGATIVE
LIPASE SERPL-CCNC: 16 U/L (ref 13–60)
LYMPHOCYTES # BLD AUTO: 1.86 10*3/MM3 (ref 0.7–3.1)
LYMPHOCYTES NFR BLD AUTO: 30.2 % (ref 19.6–45.3)
MCH RBC QN AUTO: 28.4 PG (ref 26.6–33)
MCHC RBC AUTO-ENTMCNC: 32.1 G/DL (ref 31.5–35.7)
MCV RBC AUTO: 88.5 FL (ref 79–97)
MONOCYTES # BLD AUTO: 0.54 10*3/MM3 (ref 0.1–0.9)
MONOCYTES NFR BLD AUTO: 8.8 % (ref 5–12)
NEUTROPHILS NFR BLD AUTO: 3.56 10*3/MM3 (ref 1.7–7)
NEUTROPHILS NFR BLD AUTO: 57.9 % (ref 42.7–76)
NITRITE UR QL STRIP: POSITIVE
NRBC BLD AUTO-RTO: 0 /100 WBC (ref 0–0.2)
PH UR STRIP.AUTO: <=5 [PH] (ref 4.5–8)
PLATELET # BLD AUTO: 290 10*3/MM3 (ref 140–450)
PMV BLD AUTO: 10.2 FL (ref 6–12)
POTASSIUM SERPL-SCNC: 3.6 MMOL/L (ref 3.5–5.2)
PROT SERPL-MCNC: 6.9 G/DL (ref 6–8.5)
PROT UR QL STRIP: NEGATIVE
RBC # BLD AUTO: 4.16 10*6/MM3 (ref 3.77–5.28)
RBC # UR: ABNORMAL /HPF
REF LAB TEST METHOD: ABNORMAL
SODIUM SERPL-SCNC: 133 MMOL/L (ref 136–145)
SP GR UR STRIP: 1.02 (ref 1–1.03)
SQUAMOUS #/AREA URNS HPF: ABNORMAL /HPF
UROBILINOGEN UR QL STRIP: ABNORMAL
WBC # BLD AUTO: 6.15 10*3/MM3 (ref 3.4–10.8)
WBC CLUMPS # UR AUTO: ABNORMAL /HPF
WBC UR QL AUTO: ABNORMAL /HPF

## 2021-08-05 PROCEDURE — 74177 CT ABD & PELVIS W/CONTRAST: CPT

## 2021-08-05 PROCEDURE — 0 IOPAMIDOL PER 1 ML: Performed by: EMERGENCY MEDICINE

## 2021-08-05 PROCEDURE — 80053 COMPREHEN METABOLIC PANEL: CPT | Performed by: EMERGENCY MEDICINE

## 2021-08-05 PROCEDURE — 99283 EMERGENCY DEPT VISIT LOW MDM: CPT

## 2021-08-05 PROCEDURE — 99285 EMERGENCY DEPT VISIT HI MDM: CPT | Performed by: EMERGENCY MEDICINE

## 2021-08-05 PROCEDURE — 87186 SC STD MICRODIL/AGAR DIL: CPT | Performed by: EMERGENCY MEDICINE

## 2021-08-05 PROCEDURE — 81001 URINALYSIS AUTO W/SCOPE: CPT | Performed by: EMERGENCY MEDICINE

## 2021-08-05 PROCEDURE — 87086 URINE CULTURE/COLONY COUNT: CPT | Performed by: EMERGENCY MEDICINE

## 2021-08-05 PROCEDURE — 83690 ASSAY OF LIPASE: CPT | Performed by: EMERGENCY MEDICINE

## 2021-08-05 PROCEDURE — 85025 COMPLETE CBC W/AUTO DIFF WBC: CPT | Performed by: EMERGENCY MEDICINE

## 2021-08-05 PROCEDURE — 87077 CULTURE AEROBIC IDENTIFY: CPT | Performed by: EMERGENCY MEDICINE

## 2021-08-05 RX ORDER — CEPHALEXIN 500 MG/1
500 CAPSULE ORAL 2 TIMES DAILY
Qty: 14 CAPSULE | Refills: 0 | Status: SHIPPED | OUTPATIENT
Start: 2021-08-05 | End: 2021-08-12

## 2021-08-05 RX ADMIN — SODIUM CHLORIDE, POTASSIUM CHLORIDE, SODIUM LACTATE AND CALCIUM CHLORIDE 1000 ML: 600; 310; 30; 20 INJECTION, SOLUTION INTRAVENOUS at 02:50

## 2021-08-05 RX ADMIN — IOPAMIDOL 100 ML: 755 INJECTION, SOLUTION INTRAVENOUS at 03:30

## 2021-08-05 NOTE — ED PROVIDER NOTES
Subjective   45-year-old female presents with right lower quadrant pain, nausea, decreased appetite for the past 2 days.  Patient states she woke up Tuesday morning and throughout the day Tuesday had a sensation of upset stomach, decreased appetite, but throughout the day Wednesday had worsening pain.  Pain is primarily located in her right lower quadrant but radiates onto her left lower quadrant as well.  Pain is exacerbated by certain positions and movements.  Has not identified any relieving factors.  Patient took some Advil PM because she had difficulty sleeping last night but is otherwise not taken any medications for her symptoms.  She has not had any vomiting but reports when she eats her nausea gets worse so she really has not had much to eat today.  Patient denies any urinary symptoms.  Denies any vaginal bleeding or discharge.  Patient reports she has history of ovarian cysts but usually has not had persistent pain like this with those.  Patient has history of cholecystectomy and tubal ligation.  She reports her last bowel movement was 2 days ago which is unusual for her, normally has about 2 bowel movements daily.  Patient also recently evaluated after a syncopal episode while on vacation reports so far everything in her work-up for that has been normal.          Review of Systems   All other systems reviewed and are negative.      Past Medical History:   Diagnosis Date   • Anemia     Iron deficiency   • Diabetes mellitus (CMS/HCC)    • Fibroid    • History of transfusion    • Vitamin D deficiency disease        No Known Allergies    Past Surgical History:   Procedure Laterality Date   •  SECTION      x2   • CHOLECYSTECTOMY     • COLONOSCOPY N/A 2016    Procedure: COLONOSCOPY w/ polypectomy;  Surgeon: Darlene Vargas MD;  Location: [facility];  Service:    • ENDOMETRIAL ABLATION     • LAPAROSCOPIC CHOLECYSTECTOMY     • TUBAL ABDOMINAL LIGATION         Family History   Problem Relation Age  of Onset   • Diabetes Mother    • Hypertension Mother    • Colon cancer Father 70   • Diabetes Father    • Hypertension Father    • Colon polyps Father    • Prostate cancer Maternal Grandfather    • Diabetes Sister    • Diabetes Brother    • Breast cancer Neg Hx    • Ovarian cancer Neg Hx    • Uterine cancer Neg Hx    • Deep vein thrombosis Neg Hx    • Pulmonary embolism Neg Hx        Social History     Socioeconomic History   • Marital status:      Spouse name: WILL   • Number of children: 2   • Years of education: Not on file   • Highest education level: Not on file   Tobacco Use   • Smoking status: Never Smoker   • Smokeless tobacco: Never Used   Vaping Use   • Vaping Use: Never used   Substance and Sexual Activity   • Alcohol use: No   • Drug use: No   • Sexual activity: Yes     Partners: Male     Birth control/protection: Surgical     Comment: BTL           Objective   Physical Exam  Constitutional:       General: She is not in acute distress.     Appearance: She is obese. She is not ill-appearing, toxic-appearing or diaphoretic.   HENT:      Head: Normocephalic and atraumatic.      Mouth/Throat:      Mouth: Mucous membranes are moist.      Pharynx: Oropharynx is clear.   Eyes:      Extraocular Movements: Extraocular movements intact.      Pupils: Pupils are equal, round, and reactive to light.   Cardiovascular:      Rate and Rhythm: Normal rate and regular rhythm.      Heart sounds: Normal heart sounds.   Pulmonary:      Effort: Pulmonary effort is normal. No respiratory distress.      Breath sounds: Normal breath sounds.   Abdominal:      General: There is no distension.      Palpations: Abdomen is soft.      Tenderness: There is abdominal tenderness in the right lower quadrant and suprapubic area. There is no right CVA tenderness, left CVA tenderness, guarding or rebound. Positive signs include Rovsing's sign. Negative signs include Dumas's sign.   Musculoskeletal:         General: No tenderness,  deformity or signs of injury. Normal range of motion.   Skin:     General: Skin is warm and dry.      Capillary Refill: Capillary refill takes less than 2 seconds.   Neurological:      General: No focal deficit present.      Mental Status: She is alert and oriented to person, place, and time.   Psychiatric:         Mood and Affect: Mood normal.         Behavior: Behavior normal.         Procedures           ED Course  ED Course as of Aug 05 0442   Thu Aug 05, 2021   0440 Patient overall nontoxic-appearing.  Labs reassuring.  Patient does have bacteria and white cells in urine but this is also a contaminated specimen.  CT scan reveals no acute pathology.  Patient was noted to have incidental liver mass which requires follow-up.  This was discussed with patient and her .  Patient does not have classic UTI symptoms, but does have lower quadrant tenderness, which could be explained by urinary tract infection.  Discussed with patient option of waiting on urine culture versus treating for this now.  Ultimately we decided to go ahead and cover her for urinary tract infection but she does understand that she very well may not have true urinary tract infection and this may not be the source of her current symptoms.  Advised primary care follow-up both to discuss her current visit and to discuss any need for additional work-up of the liver mass.    [TD]      ED Course User Index  [TD] Turner Lakhani MD                                           [facility]    Final diagnoses:   Liver mass   Abdominal pain, unspecified abdominal location   Acute UTI       ED Disposition  ED Disposition     ED Disposition Condition Comment    Discharge Stable           Keya Baker, APRN  [address]  [facility] [address]  [facility] [id number] [phone]    In 3 days  follow up soon if symptoms continue or at your convenience to discuss liver findings on CT         Medication List      New Prescriptions    cephalexin 500 MG capsule  Commonly  known as: KEFLEX  Take 1 capsule by mouth 2 (Two) Times a Day for 7 days.        Stop    montelukast 10 MG tablet  Commonly known as: SINGULAIR           Where to Get Your Medications      These medications were sent to [id number] - [address], [place] [id number] [facility] [address] - [phone] PH - [phone]   [address] [address] [facility] [place] [address]    Phone: [phone]   · cephalexin 500 MG capsule          Turner Lakhani MD  08/05/21 9148

## 2021-08-05 NOTE — TELEPHONE ENCOUNTER
Caller: Candi Del Rio    Relationship: Self    Best call back number: [phone]     What is the best time to reach you: ANYTIME    Who are you requesting to speak with (clinical staff, provider,  specific staff member): PROVIDER    Do you know the name of the person who called:     What was the call regarding: PATIENT WOULD LIKE TO SPEAK TO PURA TOLLIVER ABOUT HER ER VISIT LAST NIGHT SHE HAS QUESTIONS TO ASK HER    Do you require a callback: YES

## 2021-08-05 NOTE — TELEPHONE ENCOUNTER
Went to the er for abd pain on her right side   they gave her fluids and dx her with a uti and that she had a liver mass  she will make an appt with us

## 2021-08-06 LAB — BACTERIA SPEC AEROBE CULT: ABNORMAL

## 2021-08-06 NOTE — PROGRESS NOTES
Error to previous note.  Patient has not been notified of her echocardiogram results.  She did not have a stress test.  Patient does have a small PFO on echocardiogram.  She is not currently on aspirin.  Would you like for me to have her start aspirin.  You did see her for syncopal event, I am not sure that the PFO would have caused that.

## 2021-08-09 ENCOUNTER — OFFICE VISIT (OUTPATIENT)
Dept: INTERNAL MEDICINE | Facility: CLINIC | Age: 46
End: 2021-08-09

## 2021-08-09 VITALS
TEMPERATURE: 97.5 F | HEART RATE: 80 BPM | DIASTOLIC BLOOD PRESSURE: 68 MMHG | HEIGHT: 62 IN | WEIGHT: 259 LBS | OXYGEN SATURATION: 100 % | BODY MASS INDEX: 47.66 KG/M2 | SYSTOLIC BLOOD PRESSURE: 128 MMHG

## 2021-08-09 DIAGNOSIS — K21.9 GASTROESOPHAGEAL REFLUX DISEASE, UNSPECIFIED WHETHER ESOPHAGITIS PRESENT: ICD-10-CM

## 2021-08-09 DIAGNOSIS — R14.2 BELCHING: ICD-10-CM

## 2021-08-09 DIAGNOSIS — E11.9 DIABETES MELLITUS TYPE 2, NONINSULIN DEPENDENT (HCC): ICD-10-CM

## 2021-08-09 DIAGNOSIS — R10.9 ABDOMINAL PAIN, UNSPECIFIED ABDOMINAL LOCATION: ICD-10-CM

## 2021-08-09 DIAGNOSIS — N30.00 ACUTE CYSTITIS WITHOUT HEMATURIA: Primary | ICD-10-CM

## 2021-08-09 DIAGNOSIS — R16.0 LIVER MASS: ICD-10-CM

## 2021-08-09 PROCEDURE — 99214 OFFICE O/P EST MOD 30 MIN: CPT | Performed by: NURSE PRACTITIONER

## 2021-08-09 RX ORDER — SEMAGLUTIDE 1.34 MG/ML
0.5 INJECTION, SOLUTION SUBCUTANEOUS WEEKLY
Qty: 1 PEN | Refills: 5 | Status: SHIPPED | OUTPATIENT
Start: 2021-08-09 | End: 2021-11-18

## 2021-08-09 NOTE — PROGRESS NOTES
"Subjective   Candi Del Rio is a 45 y.o. female.      History of Present Illness   The patient is here today to F/U on ED visit 8/5/2021. Went to [facility] with c/o upset stomach and abdominal pain. Pt was treated for UTI, CT abd/pelvis, neg acute but does have liver mass.   Urine culture positive for E-coli. She is still taking the keflex. She is still reporting \"horrendous gas\". Also sour burps. Also feeling bloated and right sided abd pain. Chicken broth this am has helped some.   She is passing flatus and burping with good relief.     She was on an antibiotic after her fall/syncope/?seizure as well.   The following portions of the patient's history were reviewed and updated as appropriate: allergies, current medications, past family history, past medical history, past social history, past surgical history and problem list.    Review of Systems   Constitutional: Negative.    Respiratory: Negative.    Cardiovascular: Negative.    Gastrointestinal: Positive for abdominal distention, abdominal pain, constipation, nausea and indigestion. Negative for anal bleeding, blood in stool, diarrhea, rectal pain, vomiting and GERD.   Genitourinary: Negative for dysuria, flank pain, frequency, hematuria, pelvic pain and urgency.       Objective   Physical Exam  Constitutional:       Appearance: Normal appearance. She is well-developed.   Neck:      Thyroid: No thyromegaly.   Cardiovascular:      Rate and Rhythm: Normal rate and regular rhythm.      Heart sounds: Normal heart sounds.   Pulmonary:      Effort: Pulmonary effort is normal.      Breath sounds: Normal breath sounds.   Abdominal:      General: Abdomen is flat. Bowel sounds are normal. There is no abdominal bruit.      Palpations: Abdomen is soft.      Tenderness: There is abdominal tenderness in the right lower quadrant.   Musculoskeletal:      Cervical back: Normal range of motion and neck supple.   Lymphadenopathy:      Cervical: No cervical " adenopathy.   Skin:     General: Skin is warm and dry.   Neurological:      Mental Status: She is alert.   Psychiatric:         Behavior: Behavior normal.         Thought Content: Thought content normal.         Judgment: Judgment normal.         Vitals:    08/09/21 1003   BP: 128/68   Pulse: 80   Temp: 97.5 °F (36.4 °C)   SpO2: 100%     Body mass index is 47.66 kg/m².      Assessment/Plan   Diagnoses and all orders for this visit:    1. Acute cystitis without hematuria (Primary)    2. Diabetes mellitus type 2, noninsulin dependent (CMS/HCC)  -     Semaglutide,0.25 or 0.5MG/DOS, (Ozempic, 0.25 or 0.5 MG/DOSE,) 2 MG/1.5ML solution pen-injector; Inject 0.5 mg under the skin into the appropriate area as directed 1 (One) Time Per Week.  Dispense: 1 pen; Refill: 5    3. Abdominal pain, unspecified abdominal location  -     H.pylori,IgG / IgA Antibodies    4. Belching  -     H.pylori,IgG / IgA Antibodies    5. Gastroesophageal reflux disease, unspecified whether esophagitis present    6. Liver mass  -     MRI abdomen wo contrast; Future               1. UTI- finish antibiotic, recheck in 2 weeks  2. GERD/Abd pain- decrease ozempic back to 0.5 mg weekly, check H-pylori (uanble to do due to antibiotic), start prilosec, could consider probiotic, change place of ozempic injection each week  If symptoms continue see GI  3. Liver mass- order MRI of abd with liver protocol

## 2021-08-09 NOTE — PATIENT INSTRUCTIONS
1. UTI- finish antibiotic, recheck in 2 weeks  2. GERD/Abd pain- decrease ozempic back to 0.5 mg weekly, check H-pylori, start prilosec, could consider probiotic, change place of ozempic injection each week  3. Liver mass- order MRI of abd with liver protocol

## 2021-08-10 NOTE — PROGRESS NOTES
Attempted to notify patient of echocardiogram results and recommendation to start aspirin, however there was no answer and voicemail was full and I was unable to leave a message.

## 2021-08-13 ENCOUNTER — OFFICE VISIT (OUTPATIENT)
Dept: NEUROLOGY | Facility: CLINIC | Age: 46
End: 2021-08-13

## 2021-08-13 VITALS
OXYGEN SATURATION: 98 % | HEART RATE: 80 BPM | SYSTOLIC BLOOD PRESSURE: 126 MMHG | WEIGHT: 252 LBS | HEIGHT: 62 IN | BODY MASS INDEX: 46.38 KG/M2 | DIASTOLIC BLOOD PRESSURE: 86 MMHG

## 2021-08-13 DIAGNOSIS — R56.9 SEIZURE-LIKE ACTIVITY (HCC): Primary | ICD-10-CM

## 2021-08-13 PROBLEM — G40.909 EPILEPSY: Status: RESOLVED | Noted: 2021-07-26 | Resolved: 2021-08-13

## 2021-08-13 PROCEDURE — 99204 OFFICE O/P NEW MOD 45 MIN: CPT | Performed by: PSYCHIATRY & NEUROLOGY

## 2021-08-13 NOTE — ASSESSMENT & PLAN NOTE
45 year old left handed woman who presents to neurology clinic with her , Mir, for initial evaluation and treatment of possible syncope vs seizures.  She reports that 3 weeks ago on 7/16/2021 they were on vacation in [place], she was laying down watching TV, she does not remember what happened and she woke up with blood all over and she bit through her lip.  She had lost both bladder and stool.  EMS evaluated patient and her blood sugar was not low and was in the 200s.  She had eaten that day.  She was not dehydrated.  No stress or lack of sleep.  She denies any signs of infection.  She did not experience any auras prior to this spell.  She fell down her stairs about 4-5 years ago and she hit her head without LOC.  There is family history of febrile seizures in her nieces and no one else with epilepsy.  She denies history of febrile seizures or meningitis/encephalitis.  She had a negative head CT.  I will order a brain MRI scan with seizure protocol along with a prolonged EEG for further evaluation of her potential underlying risk for epilepsy.  Discussed these findings at length with patient and her .  At this time as she has only had one possible seizure we will hold off on starting AED.  Will consider starting AED pending results of testing.  Discussed seizure precautions at length including not driving for at least 3 months of seizure freedom, taking showers as oppose to baths and staying off of elevated places and heights.

## 2021-08-13 NOTE — PROGRESS NOTES
Chief Complaint  Neurologic Problem (syncope vs epilepsy)    Subjective     {CC  Problem List  Visit Diagnosis   Encounters  Notes  Medications  Labs  Result Review Imaging  Media :23}     Candi Del Rio presents to [facility] NEUROLOGY for   HISTORY OF PRESENT ILLNESS:    Candi Del Rio is a 45 year old left handed woman who presents to neurology clinic with her , Mir, for initial evaluation and treatment of possible syncope vs seizures.  She reports that 3 weeks ago on 7/16/2021 they were on vacation in [place], she was laying down watching TV, she does not remember what happened and she woke up with blood all over and she bit through her lip.  She had lost both bladder and stool.  EMS evaluated patient and her blood sugar was not low and was in the 200s.  She had eaten that day.  She was not dehydrated.  No stress or lack of sleep.  She denies any signs of infection.  She did not experience any auras prior to this spell.  She fell down her stairs about 4-5 years ago and she hit her head without LOC.  There is family history of febrile seizures in her nieces and no one else with epilepsy.  She denies history of febrile seizures or meningitis/encephalitis.  She had a negative head CT.      Past Medical History:   Diagnosis Date   • Anemia     Iron deficiency   • Diabetes mellitus (CMS/HCC)    • Fibroid    • History of transfusion    • Vitamin D deficiency disease         Family History   Problem Relation Age of Onset   • Diabetes Mother    • Hypertension Mother    • Colon cancer Father 70   • Diabetes Father    • Hypertension Father    • Colon polyps Father    • Prostate cancer Maternal Grandfather    • Diabetes Sister    • Diabetes Brother    • Seizures Other    • Seizures Niece    • Breast cancer Neg Hx    • Ovarian cancer Neg Hx    • Uterine cancer Neg Hx    • Deep vein thrombosis Neg Hx    • Pulmonary embolism Neg Hx         Social History     Socioeconomic History   • Marital  "status:      Spouse name: WILL   • Number of children: 2   • Years of education: Not on file   • Highest education level: Not on file   Tobacco Use   • Smoking status: Never Smoker   • Smokeless tobacco: Never Used   Vaping Use   • Vaping Use: Never used   Substance and Sexual Activity   • Alcohol use: No   • Drug use: No   • Sexual activity: Yes     Partners: Male     Birth control/protection: Surgical     Comment: BTL        I have personally reviewed the ROS as stated below.     Review of Systems   Constitutional: Positive for fatigue. Negative for activity change and appetite change.   HENT: Negative for hearing loss, postnasal drip, tinnitus, trouble swallowing and voice change.    Eyes: Positive for double vision. Negative for blurred vision and pain.   Respiratory: Negative for shortness of breath and wheezing.    Gastrointestinal: Positive for abdominal pain. Negative for nausea and vomiting.   Genitourinary: Negative for dysuria, frequency and hematuria.   Musculoskeletal: Negative for back pain and gait problem.   Allergic/Immunologic: Negative for environmental allergies and food allergies.   Neurological: Positive for syncope and memory problem. Negative for dizziness, tremors, seizures, facial asymmetry, speech difficulty, weakness, light-headedness, numbness, headache and confusion.   Psychiatric/Behavioral: Positive for sleep disturbance. Negative for agitation, behavioral problems, decreased concentration, dysphoric mood, hallucinations, self-injury, suicidal ideas, negative for hyperactivity, depressed mood and stress. The patient is not nervous/anxious.         Objective   Vital Signs:   /86 (BP Location: Right arm, Patient Position: Sitting)   Pulse 80   Ht 157 cm (61.81\")   Wt 114 kg (252 lb)   SpO2 98%   BMI 46.37 kg/m²       PHYSICAL EXAM:    General   Mental Status - Alert. General Appearance - Well developed, Well groomed, Oriented and Cooperative. Orientation - Oriented " X3.       Head and Neck  Head - normocephalic, atraumatic with no lesions or palpable masses.  Neck    Global Assessment - supple.       Eye   Sclera/Conjunctiva - Bilateral - Normal.    ENMT  Mouth and Throat   Oral Cavity/Oropharynx: Oropharynx - the soft palate,uvula and tongue are normal in appearance.    Chest and Lung Exam   Chest - lung clear to auscultation bilaterally.    Cardiovascular   Cardiovascular examination reveals  - normal heart sounds, regular rate and rhythm.    Neurologic   Mental Status: Speech - Normal. Cognitive function - appropriate fund of knowledge. No impairment of attention, Impairment of concentration, impairment of long term memory or impairment of short term memory.  Cranial Nerves:   II Optic: Visual acuity - Left - Normal. Right - Normal. Visual fields - Normal (to confrontation).  III Oculomotor: Pupillary constriction - Left - Normal. Right - Normal.  VII Facial: - Normal Bilaterally.  VIII Acoustic - Bilateral - Hearing normal and (Hearing tested by finger rub).   IX Glossopharyngeal / X Vagus - Normal.  XI Accessory: Trapezius - Bilateral - Normal. Sternocleidomastoid - Bilateral - Normal.  XII Hypoglossal - Bilateral - Normal.  Eye Movements: - Normal Bilaterally.  Sensory:   Light Touch: Intact - Globally.  Motor:   Bulk and Contour: - Normal.  Tone: - Normal.  Tremor: Not present.  Strength: 5/5 normal muscle strength - All Muscles.   General Assessment of Reflexes: - deep tendon reflexes are normal. Coordination - No Impairment of finger-to-nose or Impairment of rapid alternating movements. Gait - Normal.       Result Review :                 Assessment and Plan    Problem List Items Addressed This Visit        Neuro    Seizure-like activity (CMS/HCC) - Primary    Current Assessment & Plan     45 year old left handed woman who presents to neurology clinic with her , Will, for initial evaluation and treatment of possible syncope vs seizures.  She reports that 3 weeks  ago on 7/16/2021 they were on vacation in [place], she was laying down watching TV, she does not remember what happened and she woke up with blood all over and she bit through her lip.  She had lost both bladder and stool.  EMS evaluated patient and her blood sugar was not low and was in the 200s.  She had eaten that day.  She was not dehydrated.  No stress or lack of sleep.  She denies any signs of infection.  She did not experience any auras prior to this spell.  She fell down her stairs about 4-5 years ago and she hit her head without LOC.  There is family history of febrile seizures in her nieces and no one else with epilepsy.  She denies history of febrile seizures or meningitis/encephalitis.  She had a negative head CT.  I will order a brain MRI scan with seizure protocol along with a prolonged EEG for further evaluation of her potential underlying risk for epilepsy.  Discussed these findings at length with patient and her .  At this time as she has only had one possible seizure we will hold off on starting AED.  Will consider starting AED pending results of testing.  Discussed seizure precautions at length including not driving for at least 3 months of seizure freedom, taking showers as oppose to baths and staying off of elevated places and heights.           Relevant Orders    MRI Brain With & Without Contrast    EEG          I spent 50 minutes caring for Candi on this date of service. This time includes time spent by me in the following activities:preparing for the visit, reviewing tests, obtaining and/or reviewing a separately obtained history, performing a medically appropriate examination and/or evaluation , counseling and educating the patient/family/caregiver, ordering medications, tests, or procedures, documenting information in the medical record and care coordination    Follow Up   No follow-ups on file.  Patient was given instructions and counseling regarding her condition or for health  maintenance advice. Please see specific information pulled into the AVS if appropriate.

## 2021-08-13 NOTE — PATIENT INSTRUCTIONS
[facility]  Araseli Peña MD  Neurology clinic  [phone]    With anti-seizure medications, you may initially notice side effects of fatigue, drowsiness, unsteadiness, and dizziness.  Other possible side effects include nausea, abdominal pain, headache, blurry or double vision, slurred speech and mood changes.  Generally, patients will noticed these symptoms when the medication is first started or with higher doses and will go away with time.    It is import to consistently take your medication every day.  Missing just one dose may put you at risk for a breakthrough seizure.  Consider using reminders on your phone or a pill box.    If you develop a rash, please call the neurology clinic immediately or notify another healthcare professional, as this may be potentially life-threatening.  If you are unable to reach a healthcare professional, go to the emergency room immediately for further evaluation.    If you develop thoughts of wanting to hurt yourself or others, please call the neurology clinic immediately to notify another healthcare professional.  If you are unable to reach a healthcare professional, go to the emergency room immediately for further evaluation.    It is the [place] state law that you cannot drive within 90 days of a seizure.    You should avoid certain activities that if you were to have a seizure, you could harm yourself or others. In general, it is recommended that you avoid operating heavy machinery or power tools, swimming or taking baths by yourself (showers are ok), don't stand over open flames, don't get on high ladders or the roof.  I also recommend to avoid sleeping on your stomach.    For further information on epilepsy and resources available to patients and their families, please visit the [facility] at www.efky.org or call [phone].    **Check out the [facility]'s monthly Art Group Gathering.  They are located  at [facility], [address] [place] [address].  Call Lianet Dudley at [phone] or email her at [email or web address] for the dates of future gatherings.**      **If you have having memory problems, consider HOBSCOTCH (Home-Based Self-management and Cognitive Training Changes lives).  It is an 8 week self-management program for adults with epilepsy and memory problems.  The program is free at the [facility].  Contact Shae Mann at [phone] or [email or web address].**          In general, we recommend using good judgement when you are doing certain activities and to avoid those activities that if you were to have a seizure, you could harm yourself or others. In the [facility], it is the law that you cannot drive within 90 days of a seizure. We also recommend not standing over open flames, not getting on high ladders or the roof, not swimming or taking baths by yourself (showers are ok) and not operating heavy machinery or power tools.

## 2021-08-18 ENCOUNTER — TELEPHONE (OUTPATIENT)
Dept: CARDIOLOGY | Facility: CLINIC | Age: 46
End: 2021-08-18

## 2021-08-18 NOTE — PROGRESS NOTES
Patient made aware of echocardiogram results.  Noted PFO.  She was recommended to take a baby aspirin on a daily basis.  She verbalized understanding.

## 2021-08-19 DIAGNOSIS — R10.9 ABDOMINAL PAIN, UNSPECIFIED ABDOMINAL LOCATION: ICD-10-CM

## 2021-08-19 DIAGNOSIS — R16.0 LIVER MASS: Primary | ICD-10-CM

## 2021-08-20 ENCOUNTER — HOSPITAL ENCOUNTER (OUTPATIENT)
Dept: MRI IMAGING | Facility: HOSPITAL | Age: 46
Discharge: HOME | End: 2021-08-20

## 2021-08-20 ENCOUNTER — HOSPITAL ENCOUNTER (OUTPATIENT)
Dept: NEUROLOGY | Facility: HOSPITAL | Age: 46
Discharge: HOME | End: 2021-08-20

## 2021-08-20 DIAGNOSIS — R56.9 SEIZURE-LIKE ACTIVITY (HCC): ICD-10-CM

## 2021-08-20 LAB — CREAT BLDA-MCNC: 0.7 MG/DL (ref 0.6–1.3)

## 2021-08-20 PROCEDURE — 95813 EEG EXTND MNTR 61-119 MIN: CPT | Performed by: PSYCHIATRY & NEUROLOGY

## 2021-08-20 PROCEDURE — 70553 MRI BRAIN STEM W/O & W/DYE: CPT

## 2021-08-20 PROCEDURE — 95813 EEG EXTND MNTR 61-119 MIN: CPT

## 2021-08-20 PROCEDURE — 82565 ASSAY OF CREATININE: CPT

## 2021-08-20 PROCEDURE — 0 GADOBENATE DIMEGLUMINE 529 MG/ML SOLUTION: Performed by: PSYCHIATRY & NEUROLOGY

## 2021-08-20 PROCEDURE — A9577 INJ MULTIHANCE: HCPCS | Performed by: PSYCHIATRY & NEUROLOGY

## 2021-08-20 RX ADMIN — GADOBENATE DIMEGLUMINE 20 ML: 529 INJECTION, SOLUTION INTRAVENOUS at 17:35

## 2021-08-30 DIAGNOSIS — R16.0 LIVER MASS: ICD-10-CM

## 2021-09-21 ENCOUNTER — OFFICE VISIT (OUTPATIENT)
Dept: NEUROLOGY | Facility: CLINIC | Age: 46
End: 2021-09-21

## 2021-09-21 VITALS
HEART RATE: 96 BPM | HEIGHT: 62 IN | DIASTOLIC BLOOD PRESSURE: 84 MMHG | SYSTOLIC BLOOD PRESSURE: 134 MMHG | BODY MASS INDEX: 46.38 KG/M2 | OXYGEN SATURATION: 97 % | WEIGHT: 252 LBS

## 2021-09-21 DIAGNOSIS — R55 SYNCOPE AND COLLAPSE: Primary | ICD-10-CM

## 2021-09-21 PROBLEM — R56.9 SEIZURE-LIKE ACTIVITY (HCC): Status: RESOLVED | Noted: 2021-08-13 | Resolved: 2021-09-21

## 2021-09-21 PROCEDURE — 99214 OFFICE O/P EST MOD 30 MIN: CPT | Performed by: PSYCHIATRY & NEUROLOGY

## 2021-09-21 NOTE — PROGRESS NOTES
Chief Complaint  Seizures (EEG f/u)    Subjective          Candi Del Rio presents to [facility] NEUROLOGY for   HISTORY OF PRESENT ILLNESS:    Candi Del Rio is a 45 year old left handed woman who returns to neurology clinic with her , Mir, for follow up evaluation and treatment of possible syncope vs seizures.  She reports that on 7/16/2021 they were on vacation in [place], she was laying down watching TV, she does not remember what happened and she woke up with blood all over and she bit through her lip.  She had lost both bladder and stool.  EMS evaluated patient and her blood sugar was not low and was in the 200s.  She had eaten that day.  She was not dehydrated.  No stress or lack of sleep.  She denies any signs of infection.  She did not experience any auras prior to this spell.  She fell down her stairs about 4-5 years ago and she hit her head without LOC.  There is family history of febrile seizures in her nieces and no one else with epilepsy.  She denies history of febrile seizures or meningitis/encephalitis.  She had a negative head CT.  She had a brain MRI scan done with seizure protocol which I reviewed the images independently on her visit today and demonstrates no evidence of seizure focus but per neuroradiologist did demonstrate diminutive posterior circulation and recommended CTA of head and neck for follow up.  She had a prolonged awake and sleep EEG which I read as a normal study with no evidence of seizure activity.  I informed them of these findings on visit today.      Past Medical History:   Diagnosis Date   • Anemia     Iron deficiency   • Diabetes mellitus (CMS/HCC)    • Fibroid    • History of transfusion    • Vitamin D deficiency disease         Family History   Problem Relation Age of Onset   • Diabetes Mother    • Hypertension Mother    • Colon cancer Father 70   • Diabetes Father    • Hypertension Father    • Colon polyps Father    • Prostate cancer Maternal  "Grandfather    • Diabetes Sister    • Diabetes Brother    • Seizures Other    • Seizures Niece    • Breast cancer Neg Hx    • Ovarian cancer Neg Hx    • Uterine cancer Neg Hx    • Deep vein thrombosis Neg Hx    • Pulmonary embolism Neg Hx         Social History     Socioeconomic History   • Marital status:      Spouse name: WILL   • Number of children: 2   • Years of education: Not on file   • Highest education level: Not on file   Tobacco Use   • Smoking status: Never Smoker   • Smokeless tobacco: Never Used   Vaping Use   • Vaping Use: Never used   Substance and Sexual Activity   • Alcohol use: No   • Drug use: No   • Sexual activity: Yes     Partners: Male     Birth control/protection: Surgical     Comment: BTL        I have personally reviewed the ROS as stated below.     Review of Systems   Neurological: Negative for dizziness, tremors, seizures, syncope, facial asymmetry, speech difficulty, weakness, light-headedness, numbness, headache, memory problem and confusion.   Psychiatric/Behavioral: Negative for agitation, behavioral problems, decreased concentration, dysphoric mood, hallucinations, self-injury, sleep disturbance, suicidal ideas, negative for hyperactivity, depressed mood and stress. The patient is not nervous/anxious.         Objective   Vital Signs:   /84 (BP Location: Left arm, Patient Position: Sitting)   Pulse 96   Ht 157 cm (61.81\")   Wt 114 kg (252 lb)   SpO2 97%   BMI 46.37 kg/m²       PHYSICAL EXAM:    General   Mental Status - Alert. General Appearance - Well developed, Well groomed, Oriented and Cooperative. Orientation - Oriented X3.       Head and Neck  Head - normocephalic, atraumatic with no lesions or palpable masses.  Neck    Global Assessment - supple.       Eye   Sclera/Conjunctiva - Bilateral - Normal.    ENMT  Mouth and Throat   Oral Cavity/Oropharynx: Oropharynx - the soft palate,uvula and tongue are normal in appearance.    Chest and Lung Exam   Chest - lung " clear to auscultation bilaterally.    Cardiovascular   Cardiovascular examination reveals  - normal heart sounds, regular rate and rhythm.    Neurologic   Mental Status: Speech - Normal. Cognitive function - appropriate fund of knowledge. No impairment of attention, Impairment of concentration, impairment of long term memory or impairment of short term memory.  Cranial Nerves:   II Optic: Visual acuity - Left - Normal. Right - Normal. Visual fields - Normal (to confrontation).  III Oculomotor: Pupillary constriction - Left - Normal. Right - Normal.  VII Facial: - Normal Bilaterally.  VIII Acoustic - Bilateral - Hearing normal and (Hearing tested by finger rub).   IX Glossopharyngeal / X Vagus - Normal.  XI Accessory: Trapezius - Bilateral - Normal. Sternocleidomastoid - Bilateral - Normal.  XII Hypoglossal - Bilateral - Normal.  Eye Movements: - Normal Bilaterally.  Sensory:   Light Touch: Intact - Globally.  Motor:   Bulk and Contour: - Normal.  Tone: - Normal.  Tremor: Not present.  Strength: 5/5 normal muscle strength - All Muscles.   General Assessment of Reflexes: - deep tendon reflexes are normal. Coordination - No Impairment of finger-to-nose or Impairment of rapid alternating movements. Gait - Normal.       Result Review :                 Assessment and Plan    Problem List Items Addressed This Visit        Symptoms and Signs    Syncope and collapse - Primary    Current Assessment & Plan     45 year old left handed woman who returns to neurology clinic with her , Will, for follow up evaluation and treatment of possible syncope vs seizures.  She reports that on 7/16/2021 they were on vacation in [place], she was laying down watching TV, she does not remember what happened and she woke up with blood all over and she bit through her lip.  She had lost both bladder and stool.  EMS evaluated patient and her blood sugar was not low and was in the 200s.  She had eaten that day.  She was not dehydrated.  No  stress or lack of sleep.  She denies any signs of infection.  She did not experience any auras prior to this spell.  She fell down her stairs about 4-5 years ago and she hit her head without LOC.  There is family history of febrile seizures in her nieces and no one else with epilepsy.  She denies history of febrile seizures or meningitis/encephalitis.  She had a negative head CT.  She had a brain MRI scan done with seizure protocol which I reviewed the images independently on her visit today and demonstrates no evidence of seizure focus but per neuroradiologist did demonstrate diminutive posterior circulation and recommended CTA of head and neck for follow up.  She had a prolonged awake and sleep EEG which I read as a normal study with no evidence of seizure activity.  I spoke with her about these findings and I will order a CTA of her head and neck for further evaluation.  At this time I am not certain she is epileptic and this may have been a spell of convulsive syncope.  I still recommend she not drive till 10/16/2021 to be sure she does not have any further spells.  I discussed seizure precautions.  Will follow up after testing is complete.  Discussed these findings extensively with patient and .            Relevant Orders    CT Angiogram Head    CT Angiogram Head          I spent 31 minutes caring for Candi on this date of service. This time includes time spent by me in the following activities:preparing for the visit, reviewing tests, obtaining and/or reviewing a separately obtained history, performing a medically appropriate examination and/or evaluation , counseling and educating the patient/family/caregiver, ordering medications, tests, or procedures, documenting information in the medical record, independently interpreting results and communicating that information with the patient/family/caregiver and care coordination    Follow Up   No follow-ups on file.  Patient was given instructions and  counseling regarding her condition or for health maintenance advice. Please see specific information pulled into the AVS if appropriate.

## 2021-09-21 NOTE — ASSESSMENT & PLAN NOTE
45 year old left handed woman who returns to neurology clinic with her , Mir, for follow up evaluation and treatment of possible syncope vs seizures.  She reports that on 7/16/2021 they were on vacation in [place], she was laying down watching TV, she does not remember what happened and she woke up with blood all over and she bit through her lip.  She had lost both bladder and stool.  EMS evaluated patient and her blood sugar was not low and was in the 200s.  She had eaten that day.  She was not dehydrated.  No stress or lack of sleep.  She denies any signs of infection.  She did not experience any auras prior to this spell.  She fell down her stairs about 4-5 years ago and she hit her head without LOC.  There is family history of febrile seizures in her nieces and no one else with epilepsy.  She denies history of febrile seizures or meningitis/encephalitis.  She had a negative head CT.  She had a brain MRI scan done with seizure protocol which I reviewed the images independently on her visit today and demonstrates no evidence of seizure focus but per neuroradiologist did demonstrate diminutive posterior circulation and recommended CTA of head and neck for follow up.  She had a prolonged awake and sleep EEG which I read as a normal study with no evidence of seizure activity.  I spoke with her about these findings and I will order a CTA of her head and neck for further evaluation.  At this time I am not certain she is epileptic and this may have been a spell of convulsive syncope.  I still recommend she not drive till 10/16/2021 to be sure she does not have any further spells.  I discussed seizure precautions.  Will follow up after testing is complete.  Discussed these findings extensively with patient and .

## 2021-09-24 ENCOUNTER — TELEPHONE (OUTPATIENT)
Dept: NEUROLOGY | Facility: CLINIC | Age: 46
End: 2021-09-24

## 2021-09-24 NOTE — TELEPHONE ENCOUNTER
[employer]ML[employer] paperwork for patients . I don't see where time off was discussed during pts visit.     Please advise. Looks like you suggested pt not drive and ordered CT scans. OK to approve off work to drive pt to appointments?

## 2021-09-27 NOTE — TELEPHONE ENCOUNTER
Tried calling pt to notify her that paperwork for her  is ready, however, recd telly. There was a note on the paperwork to call the daughter, but because daughter isn't on HIPPA form, unable to call and speak with her. Placing paperwork up at the front in envelope--form uploaded in chart

## 2021-09-30 ENCOUNTER — HOSPITAL ENCOUNTER (OUTPATIENT)
Dept: CT IMAGING | Facility: HOSPITAL | Age: 46
Discharge: HOME | End: 2021-09-30
Admitting: PSYCHIATRY & NEUROLOGY

## 2021-09-30 DIAGNOSIS — R55 SYNCOPE AND COLLAPSE: ICD-10-CM

## 2021-09-30 PROCEDURE — 70496 CT ANGIOGRAPHY HEAD: CPT

## 2021-09-30 PROCEDURE — 25010000002 IOPAMIDOL 61 % SOLUTION: Performed by: PSYCHIATRY & NEUROLOGY

## 2021-09-30 PROCEDURE — 70498 CT ANGIOGRAPHY NECK: CPT

## 2021-09-30 PROCEDURE — 82565 ASSAY OF CREATININE: CPT

## 2021-09-30 RX ADMIN — IOPAMIDOL 100 ML: 612 INJECTION, SOLUTION INTRAVENOUS at 08:45

## 2021-10-01 LAB — CREAT BLDA-MCNC: 0.8 MG/DL (ref 0.6–1.3)

## 2021-10-18 ENCOUNTER — OFFICE VISIT (OUTPATIENT)
Dept: NEUROLOGY | Facility: CLINIC | Age: 46
End: 2021-10-18

## 2021-10-18 VITALS
SYSTOLIC BLOOD PRESSURE: 140 MMHG | HEIGHT: 62 IN | WEIGHT: 255 LBS | BODY MASS INDEX: 46.93 KG/M2 | HEART RATE: 110 BPM | OXYGEN SATURATION: 99 % | DIASTOLIC BLOOD PRESSURE: 82 MMHG

## 2021-10-18 DIAGNOSIS — R55 SYNCOPE AND COLLAPSE: Primary | ICD-10-CM

## 2021-10-18 PROCEDURE — 99213 OFFICE O/P EST LOW 20 MIN: CPT | Performed by: PSYCHIATRY & NEUROLOGY

## 2021-10-18 NOTE — PROGRESS NOTES
Chief Complaint  Neurologic Problem (CT f/u)    Subjective          Candi Del Rio presents to [facility] NEUROLOGY for   HISTORY OF PRESENT ILLNESS:    Candi Del Rio is a 46 year old left handed woman who returns to neurology clinic with her , Mir, for follow up evaluation and treatment of possible syncope vs seizures.  She reports that on 7/16/2021 they were on vacation in [place], she was laying down watching TV, she does not remember what happened and she woke up with blood all over and she bit through her lip.  She had lost both bladder and stool.  EMS evaluated patient and her blood sugar was not low and was in the 200s.  She had eaten that day.  She was not dehydrated.  No stress or lack of sleep.  She denies any signs of infection.  She did not experience any auras prior to this spell.  She fell down her stairs about 4-5 years ago and she hit her head without LOC.  There is family history of febrile seizures in her nieces and no one else with epilepsy.  She denies history of febrile seizures or meningitis/encephalitis.  She had a negative head CT.  She had a brain MRI scan done with seizure protocol which demonstrates no evidence of seizure focus but per neuroradiologist did demonstrate diminutive posterior circulation and recommended CTA of head and neck for follow up.  She had a prolonged awake and sleep EEG which I read as a normal study with no evidence of seizure activity.  I informed them of these findings on visit today.    She had a follow up CTA done which was read as: The vertebral arteries and basilar artery are quite diminutive in size although there are prominent size posterior communicating arteries bilaterally. I suspect that these findings are due to congenital variation rather than a pathologic occlusive process. The CT angiogram of the head and neck is otherwise unremarkable.    Past Medical History:   Diagnosis Date   • Anemia     Iron deficiency   • Diabetes  "mellitus (HCC)    • Fibroid    • History of transfusion    • Vitamin D deficiency disease         Family History   Problem Relation Age of Onset   • Diabetes Mother    • Hypertension Mother    • Colon cancer Father 70   • Diabetes Father    • Hypertension Father    • Colon polyps Father    • Prostate cancer Maternal Grandfather    • Diabetes Sister    • Diabetes Brother    • Seizures Other    • Seizures Niece    • Breast cancer Neg Hx    • Ovarian cancer Neg Hx    • Uterine cancer Neg Hx    • Deep vein thrombosis Neg Hx    • Pulmonary embolism Neg Hx         Social History     Socioeconomic History   • Marital status:      Spouse name: WILL   • Number of children: 2   Tobacco Use   • Smoking status: Never Smoker   • Smokeless tobacco: Never Used   Vaping Use   • Vaping Use: Never used   Substance and Sexual Activity   • Alcohol use: No   • Drug use: No   • Sexual activity: Yes     Partners: Male     Birth control/protection: Surgical     Comment: BTL        I have personally reviewed the ROS as stated below.     Review of Systems   Neurological: Negative for dizziness, tremors, seizures, syncope, facial asymmetry, speech difficulty, weakness, light-headedness, numbness, headache, memory problem and confusion.   Psychiatric/Behavioral: Negative for agitation, behavioral problems, decreased concentration, dysphoric mood, hallucinations, self-injury, sleep disturbance, suicidal ideas, negative for hyperactivity, depressed mood and stress. The patient is not nervous/anxious.         Objective   Vital Signs:   /82   Pulse 110   Ht 157 cm (61.81\")   Wt 116 kg (255 lb)   SpO2 99%   BMI 46.93 kg/m²       PHYSICAL EXAM:    General   Mental Status - Alert. General Appearance - Well developed, Well groomed, Oriented and Cooperative. Orientation - Oriented X3.       Head and Neck  Head - normocephalic, atraumatic with no lesions or palpable masses.  Neck    Global Assessment - supple.       Eye "   Sclera/Conjunctiva - Bilateral - Normal.    ENMT  Mouth and Throat   Oral Cavity/Oropharynx: Oropharynx - the soft palate,uvula and tongue are normal in appearance.    Chest and Lung Exam   Chest - lung clear to auscultation bilaterally.    Cardiovascular   Cardiovascular examination reveals  - normal heart sounds, regular rate and rhythm.    Neurologic   Mental Status: Speech - Normal. Cognitive function - appropriate fund of knowledge. No impairment of attention, Impairment of concentration, impairment of long term memory or impairment of short term memory.  Cranial Nerves:   II Optic: Visual acuity - Left - Normal. Right - Normal. Visual fields - Normal (to confrontation).  III Oculomotor: Pupillary constriction - Left - Normal. Right - Normal.  VII Facial: - Normal Bilaterally.  VIII Acoustic - Bilateral - Hearing normal and (Hearing tested by finger rub).   IX Glossopharyngeal / X Vagus - Normal.  XI Accessory: Trapezius - Bilateral - Normal. Sternocleidomastoid - Bilateral - Normal.  XII Hypoglossal - Bilateral - Normal.  Eye Movements: - Normal Bilaterally.  Sensory:   Light Touch: Intact - Globally.  Motor:   Bulk and Contour: - Normal.  Tone: - Normal.  Tremor: Not present.  Strength: 5/5 normal muscle strength - All Muscles.   General Assessment of Reflexes: - deep tendon reflexes are normal. Coordination - No Impairment of finger-to-nose or Impairment of rapid alternating movements. Gait - Normal.       Result Review :                 Assessment and Plan    Problem List Items Addressed This Visit        Symptoms and Signs    Syncope and collapse - Primary    Current Assessment & Plan     46 year old left handed woman who returns to neurology clinic with her , Will, for follow up evaluation and treatment of possible syncope vs seizures.  She reports that on 7/16/2021 they were on vacation in [place], she was laying down watching TV, she does not remember what happened and she woke up with blood  all over and she bit through her lip.  She had lost both bladder and stool.  EMS evaluated patient and her blood sugar was not low and was in the 200s.  She had eaten that day.  She was not dehydrated.  No stress or lack of sleep.  She denies any signs of infection.  She did not experience any auras prior to this spell.  She fell down her stairs about 4-5 years ago and she hit her head without LOC.  There is family history of febrile seizures in her nieces and no one else with epilepsy.  She denies history of febrile seizures or meningitis/encephalitis.  She had a negative head CT.  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  She had a prolonged awake and sleep EEG which I read as a normal study with no evidence of seizure activity.  I spoke with her about these findings and advised her to drink lots of water.  At this time I am not certain she is epileptic and this may have been a spell of convulsive syncope.  She has not had another spell in over 3 months so she can return to driving. Discussed these findings extensively with patient and .                  I spent 24 minutes caring for Candi on this date of service. This time includes time spent by me in the following activities:preparing for the visit, reviewing tests, obtaining and/or reviewing a separately obtained history, performing a medically appropriate examination and/or evaluation , counseling and educating the patient/family/caregiver, ordering medications, tests, or procedures, documenting information in the medical record and care coordination    Follow Up   No follow-ups on file.  Patient was given instructions and counseling regarding her condition or for health maintenance advice. Please  see specific information pulled into the AVS if appropriate.

## 2021-10-18 NOTE — ASSESSMENT & PLAN NOTE
46 year old left handed woman who returns to neurology clinic with her , Mir, for follow up evaluation and treatment of possible syncope vs seizures.  She reports that on 7/16/2021 they were on vacation in [place], she was laying down watching TV, she does not remember what happened and she woke up with blood all over and she bit through her lip.  She had lost both bladder and stool.  EMS evaluated patient and her blood sugar was not low and was in the 200s.  She had eaten that day.  She was not dehydrated.  No stress or lack of sleep.  She denies any signs of infection.  She did not experience any auras prior to this spell.  She fell down her stairs about 4-5 years ago and she hit her head without LOC.  There is family history of febrile seizures in her nieces and no one else with epilepsy.  She denies history of febrile seizures or meningitis/encephalitis.  She had a negative head CT.  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  She had a prolonged awake and sleep EEG which I read as a normal study with no evidence of seizure activity.  I spoke with her about these findings and advised her to drink lots of water.  At this time I am not certain she is epileptic and this may have been a spell of convulsive syncope.  She has not had another spell in over 3 months so she can return to driving. Discussed these findings extensively with patient and .

## 2021-10-24 DIAGNOSIS — E11.9 DIABETES MELLITUS TYPE 2, NONINSULIN DEPENDENT (HCC): ICD-10-CM

## 2021-10-25 ENCOUNTER — OFFICE VISIT (OUTPATIENT)
Dept: INTERNAL MEDICINE | Facility: CLINIC | Age: 46
End: 2021-10-25

## 2021-10-25 VITALS
HEIGHT: 62 IN | TEMPERATURE: 97.7 F | OXYGEN SATURATION: 99 % | HEART RATE: 79 BPM | SYSTOLIC BLOOD PRESSURE: 114 MMHG | DIASTOLIC BLOOD PRESSURE: 66 MMHG | WEIGHT: 259 LBS | BODY MASS INDEX: 47.66 KG/M2

## 2021-10-25 DIAGNOSIS — Z72.51 UNPROTECTED SEX: ICD-10-CM

## 2021-10-25 DIAGNOSIS — N89.8 VAGINAL DISCHARGE: ICD-10-CM

## 2021-10-25 DIAGNOSIS — R35.0 URINARY FREQUENCY: ICD-10-CM

## 2021-10-25 DIAGNOSIS — R30.0 DYSURIA: Primary | ICD-10-CM

## 2021-10-25 LAB
BILIRUB BLD-MCNC: NEGATIVE MG/DL
CLARITY, POC: ABNORMAL
COLOR UR: YELLOW
EXPIRATION DATE: ABNORMAL
GLUCOSE UR STRIP-MCNC: ABNORMAL MG/DL
KETONES UR QL: NEGATIVE
LEUKOCYTE EST, POC: NEGATIVE
Lab: ABNORMAL
NITRITE UR-MCNC: NEGATIVE MG/ML
PH UR: 5.5 [PH] (ref 5–8)
PROT UR STRIP-MCNC: NEGATIVE MG/DL
RBC # UR STRIP: ABNORMAL /UL
SP GR UR: 1.02 (ref 1–1.03)
UROBILINOGEN UR QL: NORMAL

## 2021-10-25 PROCEDURE — 99214 OFFICE O/P EST MOD 30 MIN: CPT | Performed by: NURSE PRACTITIONER

## 2021-10-25 PROCEDURE — 81003 URINALYSIS AUTO W/O SCOPE: CPT | Performed by: NURSE PRACTITIONER

## 2021-10-25 RX ORDER — FLUCONAZOLE 150 MG/1
150 TABLET ORAL ONCE
Qty: 1 TABLET | Refills: 0 | Status: SHIPPED | OUTPATIENT
Start: 2021-10-25 | End: 2021-10-25

## 2021-10-25 NOTE — PROGRESS NOTES
Subjective   Candi Del Rio is a 46 y.o. female.     History of Present Illness    The patient is here today with c/o dysuria started 3 weeks ago. It is intermittent. Also with abnormal vaginal discharge.     She does note dryness with intercourse. She does not use lubricant.     She is  and monogamous. She would like to be checked for STIs.   The following portions of the patient's history were reviewed and updated as appropriate: allergies, current medications, past family history, past medical history, past social history, past surgical history and problem list.    Review of Systems   Constitutional: Negative.    Respiratory: Negative.    Cardiovascular: Negative.    Genitourinary: Positive for dysuria, frequency, urgency and vaginal discharge (white and thick/itches). Negative for difficulty urinating, flank pain, hematuria, pelvic pain, vaginal bleeding and vaginal pain.       Objective   Physical Exam  Constitutional:       Appearance: Normal appearance. She is well-developed.   Neck:      Thyroid: No thyromegaly.   Cardiovascular:      Rate and Rhythm: Normal rate and regular rhythm.      Heart sounds: Normal heart sounds.   Pulmonary:      Effort: Pulmonary effort is normal.      Breath sounds: Normal breath sounds.   Abdominal:      Tenderness: There is no right CVA tenderness or left CVA tenderness.   Musculoskeletal:      Cervical back: Normal range of motion and neck supple.   Lymphadenopathy:      Cervical: No cervical adenopathy.   Skin:     General: Skin is warm and dry.   Neurological:      Mental Status: She is alert.   Psychiatric:         Behavior: Behavior normal.         Thought Content: Thought content normal.         Judgment: Judgment normal.         Vitals:    10/25/21 0754   BP: 114/66   Pulse: 79   Temp: 97.7 °F (36.5 °C)   SpO2: 99%     Body mass index is 47.66 kg/m².      Assessment/Plan   Diagnoses and all orders for this visit:    1. Dysuria (Primary)  -     POCT urinalysis  dipstick, automated  -     Hepatitis Panel, Acute  -     HIV-1 / O / 2 Ag / Antibody 4th Generation  -     RPR  -     HSV 2 Antibody, IgG    2. Unprotected sex  -     Hepatitis Panel, Acute  -     HIV-1 / O / 2 Ag / Antibody 4th Generation  -     RPR  -     HSV 2 Antibody, IgG    3. Urinary frequency  -     Hepatitis Panel, Acute  -     HIV-1 / O / 2 Ag / Antibody 4th Generation  -     RPR  -     HSV 2 Antibody, IgG    4. Vaginal discharge  -     fluconazole (DIFLUCAN) 150 MG tablet; Take 1 tablet by mouth 1 (One) Time for 1 dose.  Dispense: 1 tablet; Refill: 0                 1. Vaginal discharge/urinary frequency/unprotected sex- will send swab and urine cx. Will go ahead and treat for yeast.  We will also check STI panel.  Encourage patient to start using lubricant with intercourse.

## 2021-10-26 LAB
25(OH)D3+25(OH)D2 SERPL-MCNC: 29 NG/ML (ref 30–100)
ALBUMIN SERPL-MCNC: 4.2 G/DL (ref 3.8–4.8)
ALBUMIN/GLOB SERPL: 1.4 {RATIO} (ref 1.2–2.2)
ALP SERPL-CCNC: 68 IU/L (ref 44–121)
ALT SERPL-CCNC: 11 IU/L (ref 0–32)
AST SERPL-CCNC: 10 IU/L (ref 0–40)
BILIRUB SERPL-MCNC: 0.3 MG/DL (ref 0–1.2)
BUN SERPL-MCNC: 9 MG/DL (ref 6–24)
BUN/CREAT SERPL: 12 (ref 9–23)
CALCIUM SERPL-MCNC: 9.7 MG/DL (ref 8.7–10.2)
CHLORIDE SERPL-SCNC: 104 MMOL/L (ref 96–106)
CHOLEST SERPL-MCNC: 166 MG/DL (ref 100–199)
CO2 SERPL-SCNC: 23 MMOL/L (ref 20–29)
CREAT SERPL-MCNC: 0.76 MG/DL (ref 0.57–1)
GLOBULIN SER CALC-MCNC: 2.9 G/DL (ref 1.5–4.5)
GLUCOSE SERPL-MCNC: 217 MG/DL (ref 65–99)
HAV IGM SERPL QL IA: NEGATIVE
HBA1C MFR BLD: 9 % (ref 4.8–5.6)
HBV CORE IGM SERPL QL IA: NEGATIVE
HBV SURFACE AG SERPL QL IA: NEGATIVE
HCV AB S/CO SERPL IA: 0.1 S/CO RATIO (ref 0–0.9)
HDLC SERPL-MCNC: 57 MG/DL
HIV 1+2 AB+HIV1 P24 AG SERPL QL IA: NON REACTIVE
HSV2 IGG SER IA-ACNC: <0.91 INDEX (ref 0–0.9)
LDLC SERPL CALC-MCNC: 88 MG/DL (ref 0–99)
LDLC/HDLC SERPL: 1.5 RATIO (ref 0–3.2)
POTASSIUM SERPL-SCNC: 4.3 MMOL/L (ref 3.5–5.2)
PROT SERPL-MCNC: 7.1 G/DL (ref 6–8.5)
RPR SER QL: NON REACTIVE
SODIUM SERPL-SCNC: 140 MMOL/L (ref 134–144)
TRIGL SERPL-MCNC: 119 MG/DL (ref 0–149)
VLDLC SERPL CALC-MCNC: 21 MG/DL (ref 5–40)

## 2021-10-28 LAB
A VAGINAE DNA VAG QL NAA+PROBE: ABNORMAL SCORE
BACTERIA UR CULT: ABNORMAL
BACTERIA UR CULT: ABNORMAL
BVAB2 DNA VAG QL NAA+PROBE: ABNORMAL SCORE
C ALBICANS DNA VAG QL NAA+PROBE: POSITIVE
C GLABRATA DNA VAG QL NAA+PROBE: NEGATIVE
C TRACH DNA VAG QL NAA+PROBE: NEGATIVE
C TRACH RRNA SPEC QL NAA+PROBE: NORMAL
MEGA1 DNA VAG QL NAA+PROBE: ABNORMAL SCORE
N GONORRHOEA DNA VAG QL NAA+PROBE: NEGATIVE
N GONORRHOEA RRNA SPEC QL NAA+PROBE: NORMAL
OTHER ANTIBIOTIC SUSC ISLT: ABNORMAL
T VAGINALIS DNA SPEC QL NAA+PROBE: NORMAL
T VAGINALIS DNA VAG QL NAA+PROBE: NEGATIVE

## 2021-10-28 RX ORDER — NITROFURANTOIN 25; 75 MG/1; MG/1
100 CAPSULE ORAL 2 TIMES DAILY
Qty: 10 CAPSULE | Refills: 0 | Status: SHIPPED | OUTPATIENT
Start: 2021-10-28 | End: 2021-11-02

## 2021-11-18 ENCOUNTER — OFFICE VISIT (OUTPATIENT)
Dept: INTERNAL MEDICINE | Facility: CLINIC | Age: 46
End: 2021-11-18

## 2021-11-18 VITALS
HEART RATE: 86 BPM | WEIGHT: 260 LBS | TEMPERATURE: 97.8 F | BODY MASS INDEX: 47.84 KG/M2 | HEIGHT: 62 IN | SYSTOLIC BLOOD PRESSURE: 112 MMHG | DIASTOLIC BLOOD PRESSURE: 68 MMHG | OXYGEN SATURATION: 97 %

## 2021-11-18 DIAGNOSIS — E55.9 VITAMIN D DEFICIENCY: ICD-10-CM

## 2021-11-18 DIAGNOSIS — Z12.11 COLON CANCER SCREENING: ICD-10-CM

## 2021-11-18 DIAGNOSIS — I10 ESSENTIAL HYPERTENSION: ICD-10-CM

## 2021-11-18 DIAGNOSIS — E66.01 CLASS 3 SEVERE OBESITY WITH SERIOUS COMORBIDITY AND BODY MASS INDEX (BMI) OF 50.0 TO 59.9 IN ADULT, UNSPECIFIED OBESITY TYPE (HCC): ICD-10-CM

## 2021-11-18 DIAGNOSIS — E11.9 DIABETES MELLITUS TYPE 2, NONINSULIN DEPENDENT (HCC): Primary | ICD-10-CM

## 2021-11-18 PROCEDURE — 99214 OFFICE O/P EST MOD 30 MIN: CPT | Performed by: NURSE PRACTITIONER

## 2021-11-18 RX ORDER — GLIPIZIDE 5 MG/1
5 TABLET ORAL DAILY
Qty: 30 TABLET | Refills: 6 | Status: SHIPPED | OUTPATIENT
Start: 2021-11-18 | End: 2022-04-07

## 2021-11-18 RX ORDER — ERGOCALCIFEROL 1.25 MG/1
50000 CAPSULE ORAL 3 TIMES WEEKLY
Qty: 36 CAPSULE | Refills: 2 | Status: SHIPPED | OUTPATIENT
Start: 2021-11-19 | End: 2023-02-12 | Stop reason: SDUPTHER

## 2021-11-18 NOTE — PROGRESS NOTES
Subjective   Candi Del Rio is a 46 y.o. female.      History of Present Illness   The patient is here today to F/U on lab work. She is feeling well. Concerned about high BG levels.     DM2- had to decrease ozempic to 0.25 mg daily, she had nausea and throat swelling at higher doses.   Pt is doing well with current medication regimen, denies adverse reactions, compliant with medication schedule.     Obesity- interested in gastric sx  The following portions of the patient's history were reviewed and updated as appropriate: allergies, current medications, past family history, past medical history, past social history, past surgical history and problem list.    Review of Systems   Constitutional: Negative for chills and fever.   Respiratory: Negative.    Cardiovascular: Negative.    Psychiatric/Behavioral: Negative for dysphoric mood and suicidal ideas. The patient is not nervous/anxious.        Objective   Physical Exam  Constitutional:       Appearance: Normal appearance. She is well-developed.   Neck:      Thyroid: No thyromegaly.   Cardiovascular:      Rate and Rhythm: Normal rate and regular rhythm.      Heart sounds: Normal heart sounds.   Pulmonary:      Effort: Pulmonary effort is normal.      Breath sounds: Normal breath sounds.   Musculoskeletal:      Cervical back: Normal range of motion and neck supple.   Lymphadenopathy:      Cervical: No cervical adenopathy.   Skin:     General: Skin is warm and dry.   Neurological:      Mental Status: She is alert.   Psychiatric:         Behavior: Behavior normal.         Thought Content: Thought content normal.         Judgment: Judgment normal.         Vitals:    11/18/21 1616   BP: 112/68   Pulse: 86   Temp: 97.8 °F (36.6 °C)   SpO2: 97%     Body mass index is 47.85 kg/m².      Assessment/Plan   Diagnoses and all orders for this visit:    1. Diabetes mellitus type 2, noninsulin dependent (HCC) (Primary)  -     glipizide (GLUCOTROL) 5 MG tablet; Take 1 tablet by mouth  Daily.  Dispense: 30 tablet; Refill: 6  -     CBC & Differential; Future  -     Ferritin; Future  -     Iron Profile; Future  -     Comprehensive Metabolic Panel; Future  -     Hemoglobin A1c; Future  -     Vitamin D 25 Hydroxy; Future  -     MicroAlbumin, Urine, Random - Urine, Clean Catch; Future    2. Vitamin D deficiency  -     vitamin D (ERGOCALCIFEROL) 1.25 MG (52464 UT) capsule capsule; Take 1 capsule by mouth 3 (Three) Times a Week.  Dispense: 36 capsule; Refill: 2  -     CBC & Differential; Future  -     Ferritin; Future  -     Iron Profile; Future  -     Comprehensive Metabolic Panel; Future  -     Hemoglobin A1c; Future  -     Vitamin D 25 Hydroxy; Future  -     MicroAlbumin, Urine, Random - Urine, Clean Catch; Future    3. Colon cancer screening  -     Ambulatory Referral For Screening Colonoscopy    4. Essential hypertension  -     CBC & Differential; Future  -     Ferritin; Future  -     Iron Profile; Future  -     Comprehensive Metabolic Panel; Future  -     Hemoglobin A1c; Future  -     Vitamin D 25 Hydroxy; Future  -     MicroAlbumin, Urine, Random - Urine, Clean Catch; Future    5. Class 3 severe obesity with serious comorbidity and body mass index (BMI) of 50.0 to 59.9 in adult, unspecified obesity type (HCC)  -     CBC & Differential; Future  -     Ferritin; Future  -     Iron Profile; Future  -     Comprehensive Metabolic Panel; Future  -     Hemoglobin A1c; Future  -     Vitamin D 25 Hydroxy; Future  -     MicroAlbumin, Urine, Random - Urine, Clean Catch; Future               1. DM2- pt defers statin, can not take whole tab of jardiance due to UTIs, stop ozempic, not helping, does not tolerate higher doses, try glipizide, aware of SE of hypoglycemia   2. Vit D def- increase 50,000 IU three times weekly, discussed poss mal absorption, discuss with GI about bx  3. Obesity- send bariatric referral    “Discussed risks/benefits to vaccination, reviewed components of the vaccine, discussed VIS,  discussed informed consent, informed consent obtained. Patient/Parent was allowed to accept or refuse vaccine. Questions answered to satisfactory state of patient/Parent. We reviewed typical age appropriate and seasonally appropriate vaccinations. Reviewed immunization history and updated state vaccination form as needed. Patient was counseled on Influenza

## 2021-11-18 NOTE — PATIENT INSTRUCTIONS
1. DM2- pt defers statin, can not take whole tab of jardiance due to UTIs, stop ozempic, not helping, does not tolerate higher doses, try glipizide   2. Vit D def- increase 50,000 IU three times weekly, discussed poss mal absorption, discuss with GI about bx

## 2022-01-17 DIAGNOSIS — E11.9 DIABETES MELLITUS TYPE 2, NONINSULIN DEPENDENT: ICD-10-CM

## 2022-01-17 RX ORDER — EMPAGLIFLOZIN 10 MG/1
TABLET, FILM COATED ORAL
Qty: 90 TABLET | Refills: 2 | Status: SHIPPED | OUTPATIENT
Start: 2022-01-17 | End: 2022-03-23

## 2022-02-10 RX ORDER — CALCIUM CITRATE/VITAMIN D3 200MG-6.25
TABLET ORAL
Qty: 100 EACH | Refills: 4 | Status: SHIPPED | OUTPATIENT
Start: 2022-02-10 | End: 2022-04-07 | Stop reason: SDUPTHER

## 2022-02-14 DIAGNOSIS — E66.01 CLASS 3 SEVERE OBESITY WITH SERIOUS COMORBIDITY AND BODY MASS INDEX (BMI) OF 50.0 TO 59.9 IN ADULT, UNSPECIFIED OBESITY TYPE: ICD-10-CM

## 2022-02-14 DIAGNOSIS — I10 ESSENTIAL HYPERTENSION: ICD-10-CM

## 2022-02-14 DIAGNOSIS — E11.9 DIABETES MELLITUS TYPE 2, NONINSULIN DEPENDENT: ICD-10-CM

## 2022-02-14 DIAGNOSIS — E55.9 VITAMIN D DEFICIENCY: ICD-10-CM

## 2022-03-07 ENCOUNTER — OFFICE VISIT (OUTPATIENT)
Dept: INTERNAL MEDICINE | Facility: CLINIC | Age: 47
End: 2022-03-07

## 2022-03-07 VITALS
DIASTOLIC BLOOD PRESSURE: 76 MMHG | WEIGHT: 262 LBS | BODY MASS INDEX: 48.21 KG/M2 | HEART RATE: 90 BPM | SYSTOLIC BLOOD PRESSURE: 116 MMHG | HEIGHT: 62 IN | TEMPERATURE: 97 F | OXYGEN SATURATION: 98 %

## 2022-03-07 DIAGNOSIS — Z12.11 COLON CANCER SCREENING: ICD-10-CM

## 2022-03-07 DIAGNOSIS — E11.9 DIABETES MELLITUS TYPE 2, NONINSULIN DEPENDENT: Primary | ICD-10-CM

## 2022-03-07 DIAGNOSIS — E66.01 CLASS 3 SEVERE OBESITY WITH SERIOUS COMORBIDITY AND BODY MASS INDEX (BMI) OF 45.0 TO 49.9 IN ADULT, UNSPECIFIED OBESITY TYPE: ICD-10-CM

## 2022-03-07 PROCEDURE — 99213 OFFICE O/P EST LOW 20 MIN: CPT | Performed by: NURSE PRACTITIONER

## 2022-03-07 NOTE — PROGRESS NOTES
Subjective   Candi Del Rio is a 46 y.o. female.      History of Present Illness   The patient is here today to F/U on lab work. She is feeling well.     DM2- does not tolerated whole dose of jardiance, ozempic stopped at last visit and glipizide started   Home BG readings are coming down.   This am 130  During the day it runs 130-150.   No labs drawn yet.     She did talk with the bariatric sx over the phone.   The following portions of the patient's history were reviewed and updated as appropriate: allergies, current medications, past family history, past medical history, past social history, past surgical history and problem list.    Review of Systems   Constitutional: Negative for chills and fever.   Respiratory: Negative.    Cardiovascular: Negative.    Psychiatric/Behavioral: Negative for dysphoric mood and suicidal ideas. The patient is not nervous/anxious.        Objective   Physical Exam  Constitutional:       Appearance: Normal appearance. She is well-developed.   Neck:      Thyroid: No thyromegaly.   Cardiovascular:      Rate and Rhythm: Normal rate and regular rhythm.      Heart sounds: Normal heart sounds.   Pulmonary:      Effort: Pulmonary effort is normal.      Breath sounds: Normal breath sounds.   Musculoskeletal:      Cervical back: Normal range of motion and neck supple.   Lymphadenopathy:      Cervical: No cervical adenopathy.   Skin:     General: Skin is warm and dry.   Neurological:      Mental Status: She is alert.   Psychiatric:         Behavior: Behavior normal.         Thought Content: Thought content normal.         Judgment: Judgment normal.         Vitals:    03/07/22 1108   BP: 116/76   Pulse: 90   Temp: 97 °F (36.1 °C)   SpO2: 98%     Body mass index is 48.21 kg/m².      Assessment/Plan   Diagnoses and all orders for this visit:    1. Diabetes mellitus type 2, noninsulin dependent (HCC) (Primary)    2. Colon cancer screening  -     Ambulatory Referral For Screening Colonoscopy    3.  Class 3 severe obesity with serious comorbidity and body mass index (BMI) of 45.0 to 49.9 in adult, unspecified obesity type (HCC)               1. DM2- check lab, home readings running better   2. Obesity- she will schedule appointment with sx.     Needs C-scope, was told that Dr. Jean Baptiste's group could not do due to BMI  “Discussed risks/benefits to vaccination, reviewed components of the vaccine, discussed VIS, discussed informed consent, informed consent obtained. Patient/Parent was allowed to accept or refuse vaccine. Questions answered to satisfactory state of patient/Parent. We reviewed typical age appropriate and seasonally appropriate vaccinations. Reviewed immunization history and updated state vaccination form as needed. Patient was counseled on Hep B  Influenza  COVID19

## 2022-03-08 ENCOUNTER — PREP FOR SURGERY (OUTPATIENT)
Dept: OTHER | Facility: HOSPITAL | Age: 47
End: 2022-03-08

## 2022-03-08 ENCOUNTER — PRE-PROCEDURE SCREENING (OUTPATIENT)
Dept: GASTROENTEROLOGY | Facility: CLINIC | Age: 47
End: 2022-03-08

## 2022-03-08 DIAGNOSIS — Z12.11 ENCOUNTER FOR SCREENING FOR MALIGNANT NEOPLASM OF COLON: Primary | ICD-10-CM

## 2022-03-08 LAB
25(OH)D3+25(OH)D2 SERPL-MCNC: 49 NG/ML (ref 30–100)
ALBUMIN SERPL-MCNC: 4.2 G/DL (ref 3.8–4.8)
ALBUMIN/GLOB SERPL: 1.4 {RATIO} (ref 1.2–2.2)
ALP SERPL-CCNC: 69 IU/L (ref 44–121)
ALT SERPL-CCNC: 15 IU/L (ref 0–32)
AST SERPL-CCNC: 10 IU/L (ref 0–40)
BASOPHILS # BLD AUTO: 0 X10E3/UL (ref 0–0.2)
BASOPHILS NFR BLD AUTO: 0 %
BILIRUB SERPL-MCNC: 0.2 MG/DL (ref 0–1.2)
BUN SERPL-MCNC: 8 MG/DL (ref 6–24)
BUN/CREAT SERPL: 12 (ref 9–23)
CALCIUM SERPL-MCNC: 9.1 MG/DL (ref 8.7–10.2)
CHLORIDE SERPL-SCNC: 100 MMOL/L (ref 96–106)
CO2 SERPL-SCNC: 19 MMOL/L (ref 20–29)
CREAT SERPL-MCNC: 0.68 MG/DL (ref 0.57–1)
EGFR GENE MUT ANL BLD/T: 109 ML/MIN/1.73
EOSINOPHIL # BLD AUTO: 0.1 X10E3/UL (ref 0–0.4)
EOSINOPHIL NFR BLD AUTO: 2 %
ERYTHROCYTE [DISTWIDTH] IN BLOOD BY AUTOMATED COUNT: 13.2 % (ref 11.7–15.4)
FERRITIN SERPL-MCNC: 29 NG/ML (ref 15–150)
GLOBULIN SER CALC-MCNC: 3.1 G/DL (ref 1.5–4.5)
GLUCOSE SERPL-MCNC: 242 MG/DL (ref 65–99)
HBA1C MFR BLD: 9.6 % (ref 4.8–5.6)
HCT VFR BLD AUTO: 38.4 % (ref 34–46.6)
HGB BLD-MCNC: 12.3 G/DL (ref 11.1–15.9)
IMM GRANULOCYTES # BLD AUTO: 0 X10E3/UL (ref 0–0.1)
IMM GRANULOCYTES NFR BLD AUTO: 0 %
IRON SATN MFR SERPL: 11 % (ref 15–55)
IRON SERPL-MCNC: 40 UG/DL (ref 27–159)
LYMPHOCYTES # BLD AUTO: 1.6 X10E3/UL (ref 0.7–3.1)
LYMPHOCYTES NFR BLD AUTO: 34 %
MCH RBC QN AUTO: 28.3 PG (ref 26.6–33)
MCHC RBC AUTO-ENTMCNC: 32 G/DL (ref 31.5–35.7)
MCV RBC AUTO: 88 FL (ref 79–97)
MONOCYTES # BLD AUTO: 0.3 X10E3/UL (ref 0.1–0.9)
MONOCYTES NFR BLD AUTO: 7 %
NEUTROPHILS # BLD AUTO: 2.6 X10E3/UL (ref 1.4–7)
NEUTROPHILS NFR BLD AUTO: 57 %
PLATELET # BLD AUTO: 342 X10E3/UL (ref 150–450)
POTASSIUM SERPL-SCNC: 4.4 MMOL/L (ref 3.5–5.2)
PROT SERPL-MCNC: 7.3 G/DL (ref 6–8.5)
RBC # BLD AUTO: 4.35 X10E6/UL (ref 3.77–5.28)
SODIUM SERPL-SCNC: 137 MMOL/L (ref 134–144)
TIBC SERPL-MCNC: 354 UG/DL (ref 250–450)
UIBC SERPL-MCNC: 314 UG/DL (ref 131–425)
WBC # BLD AUTO: 4.6 X10E3/UL (ref 3.4–10.8)

## 2022-03-08 RX ORDER — SODIUM CHLORIDE, SODIUM LACTATE, POTASSIUM CHLORIDE, CALCIUM CHLORIDE 600; 310; 30; 20 MG/100ML; MG/100ML; MG/100ML; MG/100ML
30 INJECTION, SOLUTION INTRAVENOUS CONTINUOUS
Status: CANCELLED | OUTPATIENT
Start: 2022-03-08

## 2022-03-08 NOTE — TELEPHONE ENCOUNTER
Last scope 10yrs ( no records)--Personal history of polyps--Family history of polyps (father)--Family history of colon cancer (father)--No Blood thinners--Medications:        glipizide (GLUCOTROL) 5 MG tablet  Jardiance 10 MG tablet tablet  metFORMIN (GLUCOPHAGE) 1000 MG tablet  True Metrix Blood Glucose Test test strip  Ultra Thin Lancets 31G misc  vitamin B-12 (CYANOCOBALAMIN) 1000 MCG tablet  vitamin D (ERGOCALCIFEROL) 1.25 MG (23941 UT) capsule capsule                Pt verbalized and understood that it would be few weeks before been schedule

## 2022-03-09 ENCOUNTER — TELEPHONE (OUTPATIENT)
Dept: INTERNAL MEDICINE | Facility: CLINIC | Age: 47
End: 2022-03-09

## 2022-03-09 DIAGNOSIS — E11.9 DIABETES MELLITUS TYPE 2, NONINSULIN DEPENDENT: Primary | ICD-10-CM

## 2022-03-09 NOTE — TELEPHONE ENCOUNTER
Caller: Candi Del Rio    Relationship: Self    Best call back number: [phone]    What is the best time to reach you: ANY    Who are you requesting to speak with (clinical staff, provider,  specific staff member): CLINICAL STAFF    Do you know the name of the person who called: PATIENT    What was the call regarding: PATIENT RETURNING A CALL TO CANDI.  PLEASE CALL BACK.    Do you require a callback: YES

## 2022-03-17 PROBLEM — E66.01 MORBID (SEVERE) OBESITY DUE TO EXCESS CALORIES (HCC): Status: RESOLVED | Noted: 2020-11-03 | Resolved: 2022-03-17

## 2022-03-17 PROBLEM — I10 ESSENTIAL HYPERTENSION: Status: RESOLVED | Noted: 2019-11-04 | Resolved: 2022-03-17

## 2022-03-17 PROBLEM — J30.9 ALLERGIC RHINITIS: Status: RESOLVED | Noted: 2017-01-27 | Resolved: 2022-03-17

## 2022-03-17 PROBLEM — IMO0002 TYPE II DIABETES MELLITUS, UNCONTROLLED: Status: ACTIVE | Noted: 2022-03-17

## 2022-03-17 PROBLEM — E53.8 VITAMIN B12 DEFICIENCY: Status: ACTIVE | Noted: 2022-03-17

## 2022-03-17 PROBLEM — R06.83 SNORING: Status: RESOLVED | Noted: 2020-02-05 | Resolved: 2022-03-17

## 2022-03-17 PROBLEM — D50.9 IRON DEFICIENCY ANEMIA: Status: ACTIVE | Noted: 2022-03-17

## 2022-03-23 ENCOUNTER — TELEMEDICINE (OUTPATIENT)
Dept: INTERNAL MEDICINE | Facility: CLINIC | Age: 47
End: 2022-03-23

## 2022-03-23 VITALS — TEMPERATURE: 97.8 F | HEIGHT: 62 IN | BODY MASS INDEX: 48.21 KG/M2

## 2022-03-23 DIAGNOSIS — J01.00 ACUTE NON-RECURRENT MAXILLARY SINUSITIS: ICD-10-CM

## 2022-03-23 DIAGNOSIS — E11.65 UNCONTROLLED TYPE 2 DIABETES MELLITUS WITH HYPERGLYCEMIA: Primary | ICD-10-CM

## 2022-03-23 PROCEDURE — 99213 OFFICE O/P EST LOW 20 MIN: CPT | Performed by: NURSE PRACTITIONER

## 2022-03-23 RX ORDER — AMOXICILLIN 875 MG/1
875 TABLET, COATED ORAL 2 TIMES DAILY
Qty: 20 TABLET | Refills: 0 | Status: SHIPPED | OUTPATIENT
Start: 2022-03-23 | End: 2022-04-07

## 2022-03-23 NOTE — PROGRESS NOTES
"Subjective   Candi Del Rio is a 46 y.o. female.     History of Present Illness    The patient is here today with c/o sore throat, drainage, fever. Fever is now gone. Symptoms started last week. Took at home COVID test last week and today negative. \"Im not getting better. Posterior neck with tenderness near lymph nodes. Coughing up greenish/brown sputum.   Has a lot of drainage at night. If she eats greasy stuff it makes her throw up.   She is taking mucinex, sudafed, dayquil.     Positive for sick coworkers. Strep, stomach bug, sinus infections.     She returned to her ozempic since her A1C was up. She stopped her jardiance due to vaginal itching.   The following portions of the patient's history were reviewed and updated as appropriate: allergies, current medications, past family history, past medical history, past social history, past surgical history and problem list.    Review of Systems   Constitutional: Negative for chills, diaphoresis and fever.   HENT: Positive for postnasal drip, rhinorrhea, sinus pressure and sore throat. Negative for ear pain.    Respiratory: Positive for cough and shortness of breath (with PND at night). Negative for wheezing.    Cardiovascular: Negative.    Hematological: Positive for adenopathy.       Objective   Physical Exam  Constitutional:       Appearance: Normal appearance. She is well-developed.   HENT:      Nose:      Right Sinus: Maxillary sinus tenderness present.      Left Sinus: Maxillary sinus tenderness present.   Neck:      Thyroid: No thyromegaly.   Cardiovascular:      Rate and Rhythm: Normal rate and regular rhythm.      Heart sounds: Normal heart sounds.   Pulmonary:      Effort: Pulmonary effort is normal.      Breath sounds: Normal breath sounds.   Musculoskeletal:      Cervical back: Normal range of motion and neck supple.   Lymphadenopathy:      Cervical: No cervical adenopathy.   Skin:     General: Skin is warm and dry.   Neurological:      Mental Status: She " is alert.   Psychiatric:         Behavior: Behavior normal.         Thought Content: Thought content normal.         Judgment: Judgment normal.         Vitals:    03/23/22 1509   Temp: 97.8 °F (36.6 °C)     Body mass index is 48.21 kg/m².      Assessment/Plan   Diagnoses and all orders for this visit:    1. Uncontrolled type 2 diabetes mellitus with hyperglycemia (HCC) (Primary)  -     Semaglutide,0.25 or 0.5MG/DOS, (OZEMPIC) 2 MG/1.5ML solution pen-injector; Inject 0.5 mg under the skin into the appropriate area as directed 1 (One) Time Per Week.  Dispense: 1 pen; Refill: 5    2. Acute non-recurrent maxillary sinusitis  -     amoxicillin (AMOXIL) 875 MG tablet; Take 1 tablet by mouth 2 (Two) Times a Day.  Dispense: 20 tablet; Refill: 0        Visit via video.          1. DM2- continue glipizide and metformin, try alt btw 0.25 and 0.5 mg weekly.   2. Sinusitis- amoxil, notify for persistent or worsening symptoms, continue mucinex and tylenol as needed. Hydrate well, cut back on sudafed

## 2022-04-04 ENCOUNTER — OFFICE VISIT (OUTPATIENT)
Dept: OBSTETRICS AND GYNECOLOGY | Facility: CLINIC | Age: 47
End: 2022-04-04

## 2022-04-04 VITALS
HEIGHT: 62 IN | BODY MASS INDEX: 48.4 KG/M2 | SYSTOLIC BLOOD PRESSURE: 118 MMHG | WEIGHT: 263 LBS | DIASTOLIC BLOOD PRESSURE: 72 MMHG

## 2022-04-04 DIAGNOSIS — Z12.31 ENCOUNTER FOR SCREENING MAMMOGRAM FOR MALIGNANT NEOPLASM OF BREAST: ICD-10-CM

## 2022-04-04 DIAGNOSIS — N39.0 URINARY TRACT INFECTION WITH HEMATURIA, SITE UNSPECIFIED: ICD-10-CM

## 2022-04-04 DIAGNOSIS — R82.90 ABNORMAL URINALYSIS: ICD-10-CM

## 2022-04-04 DIAGNOSIS — R31.9 URINARY TRACT INFECTION WITH HEMATURIA, SITE UNSPECIFIED: ICD-10-CM

## 2022-04-04 DIAGNOSIS — N92.1 MENOMETRORRHAGIA: ICD-10-CM

## 2022-04-04 DIAGNOSIS — Z01.419 PAP SMEAR, LOW-RISK: Primary | ICD-10-CM

## 2022-04-04 DIAGNOSIS — Z11.51 SPECIAL SCREENING EXAMINATION FOR HUMAN PAPILLOMAVIRUS (HPV): ICD-10-CM

## 2022-04-04 DIAGNOSIS — Z01.419 ROUTINE GYNECOLOGICAL EXAMINATION: ICD-10-CM

## 2022-04-04 PROBLEM — Z12.11 ENCOUNTER FOR SCREENING FOR MALIGNANT NEOPLASM OF COLON: Status: ACTIVE | Noted: 2022-04-04

## 2022-04-04 LAB
B-HCG UR QL: NEGATIVE
BILIRUB BLD-MCNC: NEGATIVE MG/DL
CLARITY, POC: CLEAR
COLOR UR: YELLOW
EXPIRATION DATE: NORMAL
GLUCOSE UR STRIP-MCNC: ABNORMAL MG/DL
INTERNAL NEGATIVE CONTROL: NEGATIVE
INTERNAL POSITIVE CONTROL: POSITIVE
KETONES UR QL: NEGATIVE
LEUKOCYTE EST, POC: NEGATIVE
Lab: NORMAL
NITRITE UR-MCNC: POSITIVE MG/ML
PH UR: 5 [PH] (ref 5–8)
PROT UR STRIP-MCNC: NEGATIVE MG/DL
RBC # UR STRIP: ABNORMAL /UL
SP GR UR: 1.02 (ref 1–1.03)
UROBILINOGEN UR QL: NORMAL

## 2022-04-04 PROCEDURE — 81025 URINE PREGNANCY TEST: CPT | Performed by: OBSTETRICS & GYNECOLOGY

## 2022-04-04 PROCEDURE — 81002 URINALYSIS NONAUTO W/O SCOPE: CPT | Performed by: OBSTETRICS & GYNECOLOGY

## 2022-04-04 PROCEDURE — 99396 PREV VISIT EST AGE 40-64: CPT | Performed by: OBSTETRICS & GYNECOLOGY

## 2022-04-04 PROCEDURE — 99213 OFFICE O/P EST LOW 20 MIN: CPT | Performed by: OBSTETRICS & GYNECOLOGY

## 2022-04-04 NOTE — PROGRESS NOTES
"Chief Complaint   Patient presents with   • GI Problem         History of Present Illness  Patient is a 46-year-old female who presents today for evaluation.  She was referred for colon cancer screening.  She had an MRI of the liver August 2021 that showed liver lesions felt to be hemangiomas for which follow-up imaging in 6 months was recommended.    Patient presents today for evaluation.  She had a colonoscopy in 2016 with colon polyps.    She has a family history of colon cancer in her father.  She is planning to have a consultation for possible bariatric surgery and was recommended to undergo preoperative EGD for clearance.    She denies any heartburn, reflux, nausea, vomiting, or dysphagia.  Denies any abdominal pain.  Denies any bowel complaints or bleeding.    Result Review :       MRI abdomen w wo contrast (08/30/2021 15:26)   Telemedicine with Keya Baker APRN (03/23/2022)       Vital Signs:   /70   Pulse 80   Temp 97.8 °F (36.6 °C)   Ht 156.8 cm (61.75\")   Wt 120 kg (263 lb 11.2 oz)   SpO2 98%   BMI 48.62 kg/m²     Body mass index is 48.62 kg/m².     Physical Exam  Vitals reviewed.   Constitutional:       General: She is not in acute distress.     Appearance: She is well-developed.   HENT:      Head: Normocephalic and atraumatic.   Pulmonary:      Effort: Pulmonary effort is normal. No respiratory distress.   Abdominal:      General: Abdomen is flat. Bowel sounds are normal. There is no distension.      Palpations: Abdomen is soft.      Tenderness: There is no abdominal tenderness.   Skin:     General: Skin is dry.      Coloration: Skin is not pale.   Neurological:      Mental Status: She is alert and oriented to person, place, and time.   Psychiatric:         Thought Content: Thought content normal.           Assessment and Plan    Diagnoses and all orders for this visit:    1. Encounter for screening for malignant neoplasm of colon (Primary)  -     CT Abdomen With & Without Contrast; " Future    2. Liver lesion  -     CT Abdomen With & Without Contrast; Future    3. Preoperative clearance    4. History of colon polyps    5. Family history of polyps in the colon         Discussion  Patient presents today for evaluation.  She is due for colonoscopy for screening purposes and surveillance of polyps which we will arrange.  We will arrange for EGD at time of colonoscopy for preoperative clearance for bariatric surgery.  She is also due for surveillance CT scan for follow-up of liver liver lesions and we will schedule this as well.  If this confirms liver lesions are benign hemangiomas, we can likely discontinue surveillance.          Follow Up   Return if symptoms worsen or fail to improve.    Patient Instructions   Schedule EGD and colonoscopy for further evaluation.     Schedule CT liver protocol to follow up on liver lesions.

## 2022-04-04 NOTE — PROGRESS NOTES
GYN Annual Exam     CC- Here for annual exam.     Candi Del Rio is a 46 y.o. female est pt  who presents for annual well woman exam.  She has a known history of a fibroid uterus.  She did have an ablation in . She had some cardiac issues and so was taken off of Micronor and now her cycles are heavy again and she is passing clots. She is scheduled for her C scope soon. She is currently on Amoxicillin for a URI and also has nitrates in her urine today but is asymptomatic so we will treat her for a positive culture.     OB History        2    Para   2    Term   2            AB        Living   2       SAB        IAB        Ectopic        Molar        Multiple        Live Births              Obstetric Comments   2 C/S             Menarche: 15  Current contraception: tubal ligation  History of abnormal Pap smear: no  History of abnormal mammogram: no  Family history of uterine, colon or ovarian cancer: yes - dad colon age 70  Family history of breast cancer: no  H/o STDs: none  Last pap: 2020- nl pap/HPV  Gardasil:missed  CHELA: none   Fibroid 2.6 cm in 2016  Endometrial ablation in     Health Maintenance   Topic Date Due   • Hepatitis B (1 of 3 - Risk 3-dose series) Never done   • URINE MICROALBUMIN  2021   • COLORECTAL CANCER SCREENING  2021   • COVID-19 Vaccine (3 - Booster for Pfizer series) 2021   • DIABETIC EYE EXAM  2022   • MAMMOGRAM  2022   • DIABETIC FOOT EXAM  2022   • ANNUAL PHYSICAL  2022   • INFLUENZA VACCINE  2022   • HEMOGLOBIN A1C  2022   • LIPID PANEL  10/25/2022   • PAP SMEAR  2023   • Annual Gynecologic Pelvic and Breast Exam  2023   • TDAP/TD VACCINES (2 - Td or Tdap) 2030   • Pneumococcal Vaccine 0-64 (2 of 2 - PPSV23) 2040   • HEPATITIS C SCREENING  Completed       Past Medical History:   Diagnosis Date   • 1st degree AV block    • Anxiety    • Fibroid    • Glaucoma    • History of transfusion    •  Iron deficiency anemia    • Morbidly obese (HCC)    • APRIL (obstructive sleep apnea)    • Paroxysmal A-fib (HCC)    • Syncope and collapse    • Type II diabetes mellitus, uncontrolled    • Vitamin B12 deficiency    • Vitamin D deficiency        Past Surgical History:   Procedure Laterality Date   •  SECTION      x2   • CHOLECYSTECTOMY     • COLONOSCOPY N/A 2016    Procedure: COLONOSCOPY w/ polypectomy;  Surgeon: Darlene Vargas MD;  Location: [facility];  Service:    • ENDOMETRIAL ABLATION     • LAPAROSCOPIC CHOLECYSTECTOMY     • TUBAL ABDOMINAL LIGATION           Current Outpatient Medications:   •  amoxicillin (AMOXIL) 875 MG tablet, Take 1 tablet by mouth 2 (Two) Times a Day., Disp: 20 tablet, Rfl: 0  •  glipizide (GLUCOTROL) 5 MG tablet, Take 1 tablet by mouth Daily., Disp: 30 tablet, Rfl: 6  •  metFORMIN (GLUCOPHAGE) 1000 MG tablet, TAKE 1 TABLET BY MOUTH TWICE DAILY WITH MEALS, Disp: 180 tablet, Rfl: 1  •  Semaglutide,0.25 or 0.5MG/DOS, (OZEMPIC) 2 MG/1.5ML solution pen-injector, Inject 0.5 mg under the skin into the appropriate area as directed 1 (One) Time Per Week., Disp: 1 pen, Rfl: 5  •  True Metrix Blood Glucose Test test strip, CHECK BLOOD SUGAR TWICE DAILY, Disp: 100 each, Rfl: 4  •  Ultra Thin Lancets 31G misc, , Disp: , Rfl:   •  vitamin B-12 (CYANOCOBALAMIN) 1000 MCG tablet, Place 1 tablet under the tongue daily., Disp: , Rfl:   •  vitamin D (ERGOCALCIFEROL) 1.25 MG (05487 UT) capsule capsule, Take 1 capsule by mouth 3 (Three) Times a Week., Disp: 36 capsule, Rfl: 2    No Known Allergies    Social History     Tobacco Use   • Smoking status: Never Smoker   • Smokeless tobacco: Never Used   Vaping Use   • Vaping Use: Never used   Substance Use Topics   • Alcohol use: No   • Drug use: No       Family History   Problem Relation Age of Onset   • Diabetes Mother    • Hypertension Mother    • Colon cancer Father 70   • Diabetes Father    • Hypertension Father    • Colon polyps Father    •  "Prostate cancer Maternal Grandfather    • Diabetes Sister    • Diabetes Brother    • Seizures Other    • Seizures Niece    • Breast cancer Neg Hx    • Ovarian cancer Neg Hx    • Uterine cancer Neg Hx    • Deep vein thrombosis Neg Hx    • Pulmonary embolism Neg Hx        Review of Systems   Constitutional: Positive for activity change. Negative for appetite change, fatigue, fever and unexpected weight change.   Eyes: Negative for photophobia and visual disturbance.   Respiratory: Positive for cough. Negative for shortness of breath.    Cardiovascular: Negative for chest pain and palpitations.   Gastrointestinal: Negative for abdominal distention, abdominal pain, constipation, diarrhea and nausea.   Endocrine: Negative for cold intolerance and heat intolerance.   Genitourinary: Positive for menstrual problem. Negative for dyspareunia, dysuria, pelvic pain, vaginal bleeding and vaginal discharge.   Musculoskeletal: Negative for back pain.   Skin: Negative for color change and rash.   Neurological: Negative for headaches.   Hematological: Negative for adenopathy. Does not bruise/bleed easily.   Psychiatric/Behavioral: Negative for dysphoric mood. The patient is not nervous/anxious.        /72   Ht 157 cm (61.81\")   Wt 119 kg (263 lb)   LMP 03/28/2022   BMI 48.40 kg/m²     Physical Exam   Constitutional: She is oriented to person, place, and time. She appears well-developed.   HENT:   Head: Normocephalic and atraumatic.   Eyes: Conjunctivae are normal. No scleral icterus.   Neck: No thyromegaly present.   Cardiovascular: Normal rate and regular rhythm.   Pulmonary/Chest: Effort normal and breath sounds normal. Right breast exhibits no inverted nipple, no mass, no nipple discharge, no skin change and no tenderness. Left breast exhibits no inverted nipple, no mass, no nipple discharge, no skin change and no tenderness.   Abdominal: Soft. Normal appearance and bowel sounds are normal. She exhibits no distension " and no mass. There is no abdominal tenderness. There is no rebound and no guarding. No hernia.   Genitourinary:    Rectum normal.      Pelvic exam was performed with patient supine.   There is no rash, tenderness, lesion or injury on the right labia. There is no rash, tenderness, lesion or injury on the left labia. Uterus is enlarged. Uterus is not deviated, not fixed and not tender. Cervix exhibits no motion tenderness, no discharge and no friability. Right adnexum displays no mass, no tenderness and no fullness. Left adnexum displays no mass, no tenderness and no fullness.    No vaginal discharge, erythema, tenderness or bleeding.   No erythema, tenderness or bleeding in the vagina.    No foreign body in the vagina.      No signs of injury in the vagina.      Genitourinary Comments: Exam limited due to habitus     Neurological: She is alert and oriented to person, place, and time.   Skin: Skin is warm and dry.   Psychiatric: Her behavior is normal. Mood, judgment and thought content normal.   Nursing note and vitals reviewed.         Assessment/Plan    1) GYN HM: normal pap/HPV 2/2020. Check pap/HPV   SBE demonstrated and encouraged.  2) STD screening: declines Condoms encouraged.  3) Contraception: tubal ligation  4) Family Planning: family planning: childbearing completed, encourage folic acid daily  5) Diet and Exercise discussed  6) Smoking Status: No  7) Menometrorrhagia- stopped Micronor per cardiology. Pt has TSH, CBC and ferritin scheduled already. Schedule TVUS and endo bx. Pt good IUD candidate.   8) MMG- UTD 4/2021- B1, schedule MMG now  9) C scope UTD 12/2016- repeat 5 years. Pt says she has the procedure set up..  10) DEXA- plan age 55.   11) Parts of this document have been copied or forwarded from her previous visits and have been reviewed, updated and edited as indicated.   12)I saw the patient with a face mask, gloves and eye protection  The patient herself was masked.  Social distancing was  observed as appropriate.  13)NItrate + - check UA and CS and will treat for positive culture.   14) RTO 1 year annual and for TVUS/endo bx.          Diagnoses and all orders for this visit:    1. Pap smear, low-risk (Primary)  -     IgP, Aptima HPV    2. Routine gynecological examination  -     POC Urinalysis Dipstick  -     POC Pregnancy, Urine  -     IgP, Aptima HPV    3. Special screening examination for human papillomavirus (HPV)  -     IgP, Aptima HPV    4. Urinary tract infection with hematuria, site unspecified  -     Urine Culture - Urine, Urine, Random Void  -     Urinalysis With Microscopic - Urine, Random Void    5. Encounter for screening mammogram for malignant neoplasm of breast  -     Mammo Screening Bilateral With CAD; Future    6. Menometrorrhagia    7. Abnormal urinalysis          Shae Gage MD  4/4/2022  12:57 EDT

## 2022-04-05 ENCOUNTER — OFFICE VISIT (OUTPATIENT)
Dept: GASTROENTEROLOGY | Facility: CLINIC | Age: 47
End: 2022-04-05

## 2022-04-05 ENCOUNTER — PREP FOR SURGERY (OUTPATIENT)
Dept: SURGERY | Facility: SURGERY CENTER | Age: 47
End: 2022-04-05

## 2022-04-05 VITALS
SYSTOLIC BLOOD PRESSURE: 124 MMHG | BODY MASS INDEX: 48.53 KG/M2 | HEART RATE: 80 BPM | TEMPERATURE: 97.8 F | OXYGEN SATURATION: 98 % | WEIGHT: 263.7 LBS | DIASTOLIC BLOOD PRESSURE: 70 MMHG | HEIGHT: 62 IN

## 2022-04-05 DIAGNOSIS — Z83.71 FAMILY HISTORY OF POLYPS IN THE COLON: ICD-10-CM

## 2022-04-05 DIAGNOSIS — Z01.818 PREOPERATIVE CLEARANCE: ICD-10-CM

## 2022-04-05 DIAGNOSIS — Z12.11 ENCOUNTER FOR SCREENING FOR MALIGNANT NEOPLASM OF COLON: Primary | ICD-10-CM

## 2022-04-05 DIAGNOSIS — Z86.010 HISTORY OF COLON POLYPS: ICD-10-CM

## 2022-04-05 DIAGNOSIS — Z80.0 FAMILY HISTORY OF COLON CANCER IN FATHER: ICD-10-CM

## 2022-04-05 DIAGNOSIS — K76.9 LIVER LESION: ICD-10-CM

## 2022-04-05 PROBLEM — Z86.0100 HISTORY OF COLON POLYPS: Status: ACTIVE | Noted: 2022-04-05

## 2022-04-05 PROCEDURE — 99214 OFFICE O/P EST MOD 30 MIN: CPT | Performed by: NURSE PRACTITIONER

## 2022-04-05 RX ORDER — SODIUM CHLORIDE 0.9 % (FLUSH) 0.9 %
3 SYRINGE (ML) INJECTION EVERY 12 HOURS SCHEDULED
Status: CANCELLED | OUTPATIENT
Start: 2022-04-05

## 2022-04-05 RX ORDER — SODIUM CHLORIDE, SODIUM LACTATE, POTASSIUM CHLORIDE, CALCIUM CHLORIDE 600; 310; 30; 20 MG/100ML; MG/100ML; MG/100ML; MG/100ML
30 INJECTION, SOLUTION INTRAVENOUS CONTINUOUS PRN
Status: CANCELLED | OUTPATIENT
Start: 2022-04-05

## 2022-04-05 RX ORDER — SODIUM CHLORIDE 0.9 % (FLUSH) 0.9 %
10 SYRINGE (ML) INJECTION AS NEEDED
Status: CANCELLED | OUTPATIENT
Start: 2022-04-05

## 2022-04-05 NOTE — PATIENT INSTRUCTIONS
Schedule EGD and colonoscopy for further evaluation.     Schedule CT liver protocol to follow up on liver lesions.

## 2022-04-07 ENCOUNTER — OFFICE VISIT (OUTPATIENT)
Dept: ENDOCRINOLOGY | Age: 47
End: 2022-04-07

## 2022-04-07 VITALS
TEMPERATURE: 98.4 F | HEART RATE: 88 BPM | SYSTOLIC BLOOD PRESSURE: 124 MMHG | HEIGHT: 62 IN | DIASTOLIC BLOOD PRESSURE: 60 MMHG | OXYGEN SATURATION: 98 % | WEIGHT: 267.6 LBS | BODY MASS INDEX: 49.24 KG/M2

## 2022-04-07 DIAGNOSIS — E55.9 VITAMIN D DEFICIENCY: ICD-10-CM

## 2022-04-07 DIAGNOSIS — E11.65 TYPE 2 DIABETES MELLITUS WITH HYPERGLYCEMIA, UNSPECIFIED WHETHER LONG TERM INSULIN USE: Primary | ICD-10-CM

## 2022-04-07 PROBLEM — D50.9 IRON DEFICIENCY ANEMIA: Status: RESOLVED | Noted: 2022-03-17 | Resolved: 2022-04-07

## 2022-04-07 PROBLEM — N92.1 MENOMETRORRHAGIA: Status: RESOLVED | Noted: 2020-02-03 | Resolved: 2022-04-07

## 2022-04-07 PROBLEM — Z86.0100 HISTORY OF COLON POLYPS: Status: RESOLVED | Noted: 2022-04-05 | Resolved: 2022-04-07

## 2022-04-07 PROBLEM — Z01.818 PREOPERATIVE CLEARANCE: Status: RESOLVED | Noted: 2022-04-05 | Resolved: 2022-04-07

## 2022-04-07 PROBLEM — Z86.010 HISTORY OF COLON POLYPS: Status: RESOLVED | Noted: 2022-04-05 | Resolved: 2022-04-07

## 2022-04-07 PROBLEM — IMO0002 TYPE II DIABETES MELLITUS, UNCONTROLLED: Status: RESOLVED | Noted: 2022-03-17 | Resolved: 2022-04-07

## 2022-04-07 PROBLEM — Z80.0 FAMILY HISTORY OF COLON CANCER IN FATHER: Status: RESOLVED | Noted: 2022-04-05 | Resolved: 2022-04-07

## 2022-04-07 PROBLEM — Z12.11 ENCOUNTER FOR SCREENING FOR MALIGNANT NEOPLASM OF COLON: Status: RESOLVED | Noted: 2022-04-04 | Resolved: 2022-04-07

## 2022-04-07 LAB
APPEARANCE UR: ABNORMAL
BACTERIA #/AREA URNS HPF: ABNORMAL /[HPF]
BACTERIA UR CULT: ABNORMAL
BACTERIA UR CULT: ABNORMAL
BILIRUB UR QL STRIP: NEGATIVE
CASTS URNS QL MICRO: ABNORMAL /LPF
COLOR UR: YELLOW
EPI CELLS #/AREA URNS HPF: ABNORMAL /HPF (ref 0–10)
GLUCOSE UR QL STRIP: ABNORMAL
HGB UR QL STRIP: ABNORMAL
KETONES UR QL STRIP: NEGATIVE
LEUKOCYTE ESTERASE UR QL STRIP: ABNORMAL
MICRO URNS: ABNORMAL
NITRITE UR QL STRIP: POSITIVE
OTHER ANTIBIOTIC SUSC ISLT: ABNORMAL
PH UR STRIP: 5 [PH] (ref 5–7.5)
PROT UR QL STRIP: NEGATIVE
RBC #/AREA URNS HPF: ABNORMAL /HPF (ref 0–2)
SP GR UR STRIP: 1.02 (ref 1–1.03)
UROBILINOGEN UR STRIP-MCNC: 0.2 MG/DL (ref 0.2–1)
WBC #/AREA URNS HPF: ABNORMAL /HPF (ref 0–5)

## 2022-04-07 PROCEDURE — 99204 OFFICE O/P NEW MOD 45 MIN: CPT | Performed by: INTERNAL MEDICINE

## 2022-04-07 RX ORDER — METFORMIN HYDROCHLORIDE 500 MG/1
TABLET, EXTENDED RELEASE ORAL
Qty: 360 TABLET | Refills: 2 | Status: SHIPPED | OUTPATIENT
Start: 2022-04-07 | End: 2022-11-16 | Stop reason: SDUPTHER

## 2022-04-07 RX ORDER — GLUCOSAM/CHON-MSM1/C/MANG/BOSW 500-416.6
TABLET ORAL
Qty: 100 EACH | Refills: 4 | Status: SHIPPED | OUTPATIENT
Start: 2022-04-07

## 2022-04-07 RX ORDER — GLIMEPIRIDE 2 MG/1
TABLET ORAL
Qty: 180 TABLET | Refills: 2 | Status: SHIPPED | OUTPATIENT
Start: 2022-04-07 | End: 2022-11-16 | Stop reason: SDUPTHER

## 2022-04-07 RX ORDER — SEMAGLUTIDE 1.34 MG/ML
INJECTION, SOLUTION SUBCUTANEOUS
Qty: 4.5 ML | Refills: 1 | Status: SHIPPED | OUTPATIENT
Start: 2022-04-07 | End: 2022-11-16

## 2022-04-07 RX ORDER — CALCIUM CITRATE/VITAMIN D3 200MG-6.25
TABLET ORAL
Qty: 100 EACH | Refills: 4 | Status: SHIPPED | OUTPATIENT
Start: 2022-04-07

## 2022-04-07 NOTE — PATIENT INSTRUCTIONS
As discussed changes to diabetic plan to improve glucose control.  Change to different version of Metformin which should not cause GI upset. Change Ozempic to be 0.25 mg once a week for one month and if have no issues then increase to 0.5 mg once a week from that point.  Will stop use of Glucotrol and change to Glimepiride 2 mg twice daily as safer agent.  Put in for diet education to be done here.      Labs to be done at least 10 days before return appointment at [facility] lab.  If labs are not done within 3 days of scheduled return appointment the appointment will be canceled and rescheduled to a later date with requirement for labs to be done as directed.      Bring med list, meter and/or log data to all appointments.

## 2022-04-08 LAB
CYTOLOGIST CVX/VAG CYTO: ABNORMAL
CYTOLOGY CVX/VAG DOC CYTO: ABNORMAL
CYTOLOGY CVX/VAG DOC THIN PREP: ABNORMAL
DX ICD CODE: ABNORMAL
DX ICD CODE: ABNORMAL
HIV 1 & 2 AB SER-IMP: ABNORMAL
HPV I/H RISK 4 DNA CVX QL PROBE+SIG AMP: NEGATIVE
OTHER STN SPEC: ABNORMAL
PATHOLOGIST CVX/VAG CYTO: ABNORMAL
RECOM F/U CVX/VAG CYTO: ABNORMAL
STAT OF ADQ CVX/VAG CYTO-IMP: ABNORMAL

## 2022-04-10 RX ORDER — NITROFURANTOIN 25; 75 MG/1; MG/1
100 CAPSULE ORAL 2 TIMES DAILY
Qty: 14 CAPSULE | Refills: 0 | Status: SHIPPED | OUTPATIENT
Start: 2022-04-10 | End: 2022-04-17

## 2022-04-11 NOTE — PROGRESS NOTES
PIP= pt has a few atypical cells on pap but HPV is negative. She does have a UTI. Rx for Macrobid called in.

## 2022-04-13 ENCOUNTER — TELEPHONE (OUTPATIENT)
Dept: GASTROENTEROLOGY | Facility: CLINIC | Age: 47
End: 2022-04-13

## 2022-04-14 ENCOUNTER — TELEPHONE (OUTPATIENT)
Dept: ENDOCRINOLOGY | Age: 47
End: 2022-04-14

## 2022-04-21 PROBLEM — Z12.11 ENCOUNTER FOR SCREENING FOR MALIGNANT NEOPLASM OF COLON: Status: ACTIVE | Noted: 2022-04-05

## 2022-04-21 PROBLEM — Z01.818 PREOPERATIVE CLEARANCE: Status: ACTIVE | Noted: 2022-04-05

## 2022-04-21 PROBLEM — Z86.010 HISTORY OF COLON POLYPS: Status: ACTIVE | Noted: 2022-04-05

## 2022-04-21 PROBLEM — Z80.0 FAMILY HISTORY OF COLON CANCER IN FATHER: Status: ACTIVE | Noted: 2022-04-05

## 2022-04-21 PROBLEM — Z86.0100 HISTORY OF COLON POLYPS: Status: ACTIVE | Noted: 2022-04-05

## 2022-04-27 ENCOUNTER — HOSPITAL ENCOUNTER (OUTPATIENT)
Dept: DIABETES SERVICES | Facility: HOSPITAL | Age: 47
Discharge: HOME | End: 2022-04-27
Admitting: INTERNAL MEDICINE

## 2022-04-27 PROCEDURE — G0109 DIAB MANAGE TRN IND/GROUP: HCPCS

## 2022-04-27 NOTE — CONSULTS
Mrs. Del Rio was seen today by RN Diabetes Educator and Registered Dietitian for the Diabetes Education Class. Comprehensive American Diabetes Association assessment will be sent to medical records to attach to this encounter.      Mrs. Del Rio has been encouraged to call our office with questions or additional education needs. Please place referral for additional services or followup should need arise. Thank you for the referral.

## 2022-04-29 ENCOUNTER — HOSPITAL ENCOUNTER (OUTPATIENT)
Dept: CT IMAGING | Facility: HOSPITAL | Age: 47
Discharge: HOME | End: 2022-04-29
Admitting: NURSE PRACTITIONER

## 2022-04-29 DIAGNOSIS — Z12.11 ENCOUNTER FOR SCREENING FOR MALIGNANT NEOPLASM OF COLON: ICD-10-CM

## 2022-04-29 DIAGNOSIS — K76.9 LIVER LESION: ICD-10-CM

## 2022-04-29 DIAGNOSIS — K76.9 LIVER LESION: Primary | ICD-10-CM

## 2022-04-29 PROCEDURE — 0 IOPAMIDOL PER 1 ML: Performed by: NURSE PRACTITIONER

## 2022-04-29 PROCEDURE — 74170 CT ABD WO CNTRST FLWD CNTRST: CPT

## 2022-04-29 RX ADMIN — IOPAMIDOL 100 ML: 755 INJECTION, SOLUTION INTRAVENOUS at 09:24

## 2022-05-09 ENCOUNTER — PREP FOR SURGERY (OUTPATIENT)
Dept: OTHER | Facility: HOSPITAL | Age: 47
End: 2022-05-09

## 2022-05-09 ENCOUNTER — OFFICE VISIT (OUTPATIENT)
Dept: OBSTETRICS AND GYNECOLOGY | Facility: CLINIC | Age: 47
End: 2022-05-09

## 2022-05-09 VITALS
DIASTOLIC BLOOD PRESSURE: 72 MMHG | HEIGHT: 62 IN | WEIGHT: 265 LBS | SYSTOLIC BLOOD PRESSURE: 122 MMHG | BODY MASS INDEX: 48.76 KG/M2

## 2022-05-09 DIAGNOSIS — N88.2 CERVICAL STENOSIS (UTERINE CERVIX): ICD-10-CM

## 2022-05-09 DIAGNOSIS — R93.89 THICKENED ENDOMETRIUM: ICD-10-CM

## 2022-05-09 DIAGNOSIS — Z01.818 PREOPERATIVE EXAM FOR GYNECOLOGIC SURGERY: Primary | ICD-10-CM

## 2022-05-09 DIAGNOSIS — Z13.89 SCREENING FOR GENITOURINARY CONDITION: ICD-10-CM

## 2022-05-09 DIAGNOSIS — N92.1 MENOMETRORRHAGIA: ICD-10-CM

## 2022-05-09 DIAGNOSIS — N92.1 MENOMETRORRHAGIA: Primary | ICD-10-CM

## 2022-05-09 LAB

## 2022-05-09 PROCEDURE — 99214 OFFICE O/P EST MOD 30 MIN: CPT | Performed by: OBSTETRICS & GYNECOLOGY

## 2022-05-09 PROCEDURE — 81002 URINALYSIS NONAUTO W/O SCOPE: CPT | Performed by: OBSTETRICS & GYNECOLOGY

## 2022-05-09 PROCEDURE — 81025 URINE PREGNANCY TEST: CPT | Performed by: OBSTETRICS & GYNECOLOGY

## 2022-05-09 RX ORDER — SODIUM CHLORIDE 0.9 % (FLUSH) 0.9 %
10 SYRINGE (ML) INJECTION AS NEEDED
Status: CANCELLED | OUTPATIENT
Start: 2022-05-09

## 2022-05-09 RX ORDER — SODIUM CHLORIDE 9 MG/ML
40 INJECTION, SOLUTION INTRAVENOUS AS NEEDED
Status: CANCELLED | OUTPATIENT
Start: 2022-05-09

## 2022-05-09 RX ORDER — SODIUM CHLORIDE 0.9 % (FLUSH) 0.9 %
10 SYRINGE (ML) INJECTION EVERY 12 HOURS SCHEDULED
Status: CANCELLED | OUTPATIENT
Start: 2022-05-09

## 2022-05-09 NOTE — PROGRESS NOTES
Candi Del Rio is a 46 y.o. patient who presents for follow up of   Chief Complaint   Patient presents with   • Procedure     Endo Bx       46-year-old established female here for ultrasound and endometrial biopsy.  She had an ablation in 2006.  She has a known history of fibroid uterus. Cardiology wanted her to stop her Micronor and now her cycles are now heavy again and she is passing clots.  She needs labs drawn.  Her ultrasound today shows an 8.9 cm retroverted uterus with an endometrial lining of 1.1 cm.  She has a posterior wall fibroid that measures 3.3 x 2.9 cm.  Her ovaries appear normal.  This ultrasound is compared to her last scan on 3/2/2020. We discussed the possibility of Lysteda, Mirena IUD, D&C and TLH. She does not feel that her bleeding warrants a hysterectomy at this time. She is concerned about an IUD and wants to hold off on all treatment if her workup is normal.       The following portions of the patient's history were reviewed and updated as appropriate: allergies, current medications and problem list.    Review of Systems   Constitutional: Positive for activity change. Negative for appetite change, fatigue, fever and unexpected weight change.   Eyes: Negative for photophobia and visual disturbance.   Respiratory: Positive for cough. Negative for shortness of breath.    Cardiovascular: Negative for chest pain and palpitations.   Gastrointestinal: Negative for abdominal distention, abdominal pain, constipation, diarrhea and nausea.   Endocrine: Negative for cold intolerance and heat intolerance.   Genitourinary: Positive for menstrual problem. Negative for dyspareunia, dysuria, pelvic pain, vaginal bleeding and vaginal discharge.   Musculoskeletal: Negative for back pain.   Skin: Negative for color change and rash.   Neurological: Negative for headaches.   Hematological: Negative for adenopathy. Does not bruise/bleed easily.   Psychiatric/Behavioral: Negative for dysphoric mood. The  "patient is not nervous/anxious.        /72   Ht 157.5 cm (62\")   Wt 120 kg (265 lb)   BMI 48.47 kg/m²     Physical Exam  Vitals and nursing note reviewed. Exam conducted with a chaperone present.   Constitutional:       Appearance: Normal appearance. She is well-developed. She is obese.   HENT:      Head: Normocephalic and atraumatic.   Eyes:      General: No scleral icterus.     Conjunctiva/sclera: Conjunctivae normal.   Neck:      Thyroid: No thyromegaly.   Abdominal:      General: Bowel sounds are normal. There is no distension.      Palpations: Abdomen is soft. There is no mass.      Tenderness: There is no abdominal tenderness. There is no guarding or rebound.      Hernia: No hernia is present.   Genitourinary:     General: Normal vulva.      Vagina: Normal.      Cervix: Normal.      Uterus: Normal.       Adnexa: Right adnexa normal and left adnexa normal.      Comments: Endometrial biopsy attempted but pt had cervical stenosis and severe discomfort so procedure discontinued  Musculoskeletal:      Cervical back: Neck supple.   Skin:     General: Skin is warm and dry.   Neurological:      Mental Status: She is alert and oriented to person, place, and time.   Psychiatric:         Behavior: Behavior normal.         Thought Content: Thought content normal.         Judgment: Judgment normal.         A/P:  1. Menometrorrhagia- check TSH, CBC and ferritin. Pt has BMI of 48 and thickened EL on US  2. Cervical stenosis-  Endo bx attempted but was not successful. D/w pt HS D&C with MYOSURE for diagnosis and therapy. She is agreeable to the D&C but does not want to do any other hormonal or surgical therapy at this time.  3. RHM- 4/2022- UTD annual ASCUS neg HPV.    Assessment/Plan   Diagnoses and all orders for this visit:    1. Screening for genitourinary condition (Primary)  -     POC Pregnancy, Urine  -     POC Urinalysis Dipstick    2. Menometrorrhagia  -     CBC & Differential  -     Ferritin  -     TSH Rfx " On Abnormal To Free T4    3. Cervical stenosis (uterine cervix)    4. Preoperative exam for gynecologic surgery                 No follow-ups on file.      Shae Gage MD    5/9/2022  11:59 EDT

## 2022-05-10 LAB
BASOPHILS # BLD AUTO: 0 X10E3/UL (ref 0–0.2)
BASOPHILS NFR BLD AUTO: 0 %
EOSINOPHIL # BLD AUTO: 0.2 X10E3/UL (ref 0–0.4)
EOSINOPHIL NFR BLD AUTO: 3 %
ERYTHROCYTE [DISTWIDTH] IN BLOOD BY AUTOMATED COUNT: 13.5 % (ref 11.7–15.4)
FERRITIN SERPL-MCNC: 22 NG/ML (ref 15–150)
HCT VFR BLD AUTO: 35.3 % (ref 34–46.6)
HGB BLD-MCNC: 11.3 G/DL (ref 11.1–15.9)
IMM GRANULOCYTES # BLD AUTO: 0 X10E3/UL (ref 0–0.1)
IMM GRANULOCYTES NFR BLD AUTO: 0 %
LYMPHOCYTES # BLD AUTO: 1.5 X10E3/UL (ref 0.7–3.1)
LYMPHOCYTES NFR BLD AUTO: 30 %
MCH RBC QN AUTO: 28 PG (ref 26.6–33)
MCHC RBC AUTO-ENTMCNC: 32 G/DL (ref 31.5–35.7)
MCV RBC AUTO: 88 FL (ref 79–97)
MONOCYTES # BLD AUTO: 0.4 X10E3/UL (ref 0.1–0.9)
MONOCYTES NFR BLD AUTO: 9 %
NEUTROPHILS # BLD AUTO: 2.8 X10E3/UL (ref 1.4–7)
NEUTROPHILS NFR BLD AUTO: 58 %
PLATELET # BLD AUTO: 317 X10E3/UL (ref 150–450)
RBC # BLD AUTO: 4.03 X10E6/UL (ref 3.77–5.28)
TSH SERPL DL<=0.005 MIU/L-ACNC: 1.87 UIU/ML (ref 0.45–4.5)
WBC # BLD AUTO: 5 X10E3/UL (ref 3.4–10.8)

## 2022-05-11 DIAGNOSIS — E11.9 DIABETES MELLITUS TYPE 2, NONINSULIN DEPENDENT: ICD-10-CM

## 2022-05-11 RX ORDER — GLIPIZIDE 5 MG/1
5 TABLET ORAL DAILY
Qty: 30 TABLET | Refills: 6 | Status: SHIPPED | OUTPATIENT
Start: 2022-05-11 | End: 2022-06-02 | Stop reason: ALTCHOICE

## 2022-05-11 NOTE — PROGRESS NOTES
PIP= patient is anemic with a hemoglobin of 11.3.  Recommend she get a colonoscopy as well as completing her D&C to evaluate causes of anemia.  Recommend over-the-counter iron daily

## 2022-05-24 PROBLEM — N88.2 CERVICAL STENOSIS (UTERINE CERVIX): Status: ACTIVE | Noted: 2022-05-24

## 2022-05-24 PROBLEM — N92.1 MENOMETRORRHAGIA: Status: ACTIVE | Noted: 2022-05-24

## 2022-05-24 PROBLEM — R93.89 THICKENED ENDOMETRIUM: Status: ACTIVE | Noted: 2022-05-24

## 2022-06-02 ENCOUNTER — PRE-ADMISSION TESTING (OUTPATIENT)
Dept: PREADMISSION TESTING | Facility: HOSPITAL | Age: 47
End: 2022-06-02

## 2022-06-02 VITALS
HEIGHT: 62 IN | RESPIRATION RATE: 16 BRPM | HEART RATE: 71 BPM | BODY MASS INDEX: 48.38 KG/M2 | WEIGHT: 262.9 LBS | OXYGEN SATURATION: 98 % | SYSTOLIC BLOOD PRESSURE: 144 MMHG | DIASTOLIC BLOOD PRESSURE: 82 MMHG

## 2022-06-02 LAB
ANION GAP SERPL CALCULATED.3IONS-SCNC: 12.9 MMOL/L (ref 5–15)
BUN SERPL-MCNC: 10 MG/DL (ref 6–20)
BUN/CREAT SERPL: 12.2 (ref 7–25)
CALCIUM SPEC-SCNC: 9.3 MG/DL (ref 8.6–10.5)
CHLORIDE SERPL-SCNC: 98 MMOL/L (ref 98–107)
CO2 SERPL-SCNC: 23.1 MMOL/L (ref 22–29)
CREAT SERPL-MCNC: 0.82 MG/DL (ref 0.57–1)
DEPRECATED RDW RBC AUTO: 43.3 FL (ref 37–54)
EGFRCR SERPLBLD CKD-EPI 2021: 89.5 ML/MIN/1.73
ERYTHROCYTE [DISTWIDTH] IN BLOOD BY AUTOMATED COUNT: 13.2 % (ref 12.3–15.4)
GLUCOSE SERPL-MCNC: 311 MG/DL (ref 65–99)
HCT VFR BLD AUTO: 36.8 % (ref 34–46.6)
HGB BLD-MCNC: 11.6 G/DL (ref 12–15.9)
MCH RBC QN AUTO: 28.4 PG (ref 26.6–33)
MCHC RBC AUTO-ENTMCNC: 31.5 G/DL (ref 31.5–35.7)
MCV RBC AUTO: 90.2 FL (ref 79–97)
PLATELET # BLD AUTO: 318 10*3/MM3 (ref 140–450)
PMV BLD AUTO: 10.2 FL (ref 6–12)
POTASSIUM SERPL-SCNC: 4.2 MMOL/L (ref 3.5–5.2)
RBC # BLD AUTO: 4.08 10*6/MM3 (ref 3.77–5.28)
SODIUM SERPL-SCNC: 134 MMOL/L (ref 136–145)
WBC NRBC COR # BLD: 4.57 10*3/MM3 (ref 3.4–10.8)

## 2022-06-02 PROCEDURE — 93005 ELECTROCARDIOGRAM TRACING: CPT

## 2022-06-02 PROCEDURE — 36415 COLL VENOUS BLD VENIPUNCTURE: CPT

## 2022-06-02 PROCEDURE — 93010 ELECTROCARDIOGRAM REPORT: CPT | Performed by: INTERNAL MEDICINE

## 2022-06-02 PROCEDURE — 80048 BASIC METABOLIC PNL TOTAL CA: CPT | Performed by: OBSTETRICS & GYNECOLOGY

## 2022-06-02 PROCEDURE — 85027 COMPLETE CBC AUTOMATED: CPT | Performed by: OBSTETRICS & GYNECOLOGY

## 2022-06-02 NOTE — PAT
Pt here for PAT visit.  Pre-op tests completed, shower w/antibacterial soap, and clears until 7:30 am dos, voiced understanding. COVID test 6/11. Sugar elevated on labs, notified office if sugar elevated dos could be canceled.

## 2022-06-02 NOTE — PROGRESS NOTES
PIP= her glucose level at PAT was 311. If her values are that high on the day of surgery, anesthesia may cancel her case. Enc her to reach out to her primary care MD for instructions and these labs were sent to [facility].

## 2022-06-02 NOTE — DISCHARGE INSTRUCTIONS
PRE-ADMISSION TESTING INSTRUCTIONS FOR ADULTS    Take these medications the morning of surgery with a small sip of water: nothing      No aspirin, advil, aleve, ibuprofen, naproxen, diet pills, decongestants, or herbal/vitamins for a week prior to surgery.    General Instructions:    DO NOT EAT SOLID FOOD AFTER MIDNIGHT THE NIGHT BEFORE SURGERY. No gum, mints, or hard candy after midnight the night before surgery.  You may drink clear liquids the day of surgery up until 2 hours before your arrival time.   (7:30 am)  Clear liquids are liquids you can see through. Nothing RED in color.     Plain water    Sports drinks  Sodas     Gelatin (Jell-O)  Fruit juices without pulp such as white grape juice and apple juice  Popsicles that contain no fruit or yogurt  Tea or coffee (no cream or milk added)    It is beneficial for you to have a clear drink that contains carbohydrates just before you leave your house and before your fasting time begins.  We suggest a 20 ounce bottle of Gatorade or Powerade for non-diabetic patients or a 20 ounce bottle of G2 or Powerade Zero for diabetic patients.  (7:30 am)    Patients who avoid smoking, chewing tobacco and alcohol for 4 weeks prior to surgery have a reduced risk of post-operative complications.  If at all possible, quit smoking as many days before surgery as you can.    Do not smoke, use chewing tobacco or drink alcohol the day of surgery    Bring your C-PAP/ BI-PAP machine if you use one.  Wear clean comfortable clothes and socks.  Do not wear contact lenses, lotion, deodorant, or make-up.  Bring a case for your glasses if applicable. You may brush your teeth the morning of surgery.  You may wear dentures/partials, do not put adhesive/glue on them.    Leave all other jewelry and valuables at home.      Preventing a Surgical Site Infection:    Shower the night before and on the morning of surgery using the chlorhexidine soap you were given.  Use a clean washcloth with the soap.   Place clean sheets on your bed after showering the night before surgery. Do not use the CHG soap on your hair, face, or private areas. Wash your body gently for five (5) minutes. Do not scrub your skin.  Dry with a clean towel and dress in clean clothing.    Do not shave the surgical area for 10 days-2 weeks prior to surgery  because the razor can irritate skin and make it easier to develop an infection.  Make sure you, your family, and all healthcare providers clean their hands with soap and water or an alcohol based hand  before caring for you or your wound.      Day of surgery:    Your surgeon’s office will advise you of your arrival time for the day of surgery.    Upon arrival, a Pre-op nurse and Anesthesia provider will review your health history, obtain vital signs, and answer questions you may have.  The only belongings needed at this time will be your home medications and if applicable your C-PAP/BI-PAP machine.  If you are staying overnight your family can leave the rest of your belongings in the car and bring them to your room later.  A Pre-op nurse will start an IV and you may receive medication in preparation for surgery, including something to help you relax.  Your family will be able to see you in the Pre-op area.  While you are in surgery your family should notify the waiting room  if they leave the waiting room area and provide a contact phone number.    IF you have any questions, you can call the Pre-Admission Department at [phone] or your surgeon's office.  Notify your surgeon if  you become sick, have a fever, productive cough, or cannot be here the day of surgery    Please be aware that surgery does come with discomfort.  We want to make every effort to control your discomfort so please discuss any uncontrolled symptoms with your nurse.   Your doctor will most likely have prescribed pain medications.      If you are going home after surgery, you will receive  individualized written care instructions before being discharged.  A responsible adult (over the age of 18) must drive you to and from the hospital on the day of your surgery and stay with you for 24 hours after anesthesia.    If you are staying overnight following surgery, you will be transported to your hospital room following the recovery period.  [facility] has all private rooms.    You may receive a survey regarding the care you received. Your feedback is very important and will be used to collect the necessary data to help us to continue to provide excellent care.     Deductibles and co-payments are collected on the day of service. Please be prepared to pay the required co-pay, deductible or deposit on the day of service as defined by your plan.

## 2022-06-03 LAB — QT INTERVAL: 356 MS

## 2022-06-06 ENCOUNTER — LAB (OUTPATIENT)
Dept: LAB | Facility: SURGERY CENTER | Age: 47
End: 2022-06-06

## 2022-06-06 DIAGNOSIS — Z80.0 FAMILY HISTORY OF COLON CANCER IN FATHER: ICD-10-CM

## 2022-06-06 DIAGNOSIS — Z86.010 HISTORY OF COLON POLYPS: ICD-10-CM

## 2022-06-06 DIAGNOSIS — Z01.818 PREOPERATIVE CLEARANCE: ICD-10-CM

## 2022-06-06 DIAGNOSIS — Z12.11 ENCOUNTER FOR SCREENING FOR MALIGNANT NEOPLASM OF COLON: ICD-10-CM

## 2022-06-06 LAB — SARS-COV-2 ORF1AB RESP QL NAA+PROBE: NOT DETECTED

## 2022-06-06 PROCEDURE — U0004 COV-19 TEST NON-CDC HGH THRU: HCPCS | Performed by: NURSE PRACTITIONER

## 2022-06-07 ENCOUNTER — ANESTHESIA EVENT (OUTPATIENT)
Dept: PERIOP | Facility: HOSPITAL | Age: 47
End: 2022-06-07

## 2022-06-08 ENCOUNTER — ANESTHESIA (OUTPATIENT)
Dept: PERIOP | Facility: HOSPITAL | Age: 47
End: 2022-06-08

## 2022-06-08 ENCOUNTER — HOSPITAL ENCOUNTER (OUTPATIENT)
Facility: HOSPITAL | Age: 47
Setting detail: HOSPITAL OUTPATIENT SURGERY
Discharge: HOME | End: 2022-06-08
Attending: INTERNAL MEDICINE | Admitting: INTERNAL MEDICINE

## 2022-06-08 VITALS
TEMPERATURE: 98.4 F | OXYGEN SATURATION: 98 % | BODY MASS INDEX: 47.15 KG/M2 | RESPIRATION RATE: 16 BRPM | HEART RATE: 61 BPM | SYSTOLIC BLOOD PRESSURE: 120 MMHG | WEIGHT: 257.8 LBS | DIASTOLIC BLOOD PRESSURE: 78 MMHG

## 2022-06-08 DIAGNOSIS — Z01.818 PREOPERATIVE CLEARANCE: ICD-10-CM

## 2022-06-08 DIAGNOSIS — Z80.0 FAMILY HISTORY OF COLON CANCER IN FATHER: ICD-10-CM

## 2022-06-08 DIAGNOSIS — Z12.11 ENCOUNTER FOR SCREENING FOR MALIGNANT NEOPLASM OF COLON: ICD-10-CM

## 2022-06-08 DIAGNOSIS — Z86.010 HISTORY OF COLON POLYPS: ICD-10-CM

## 2022-06-08 LAB — GLUCOSE BLDC GLUCOMTR-MCNC: 181 MG/DL (ref 70–130)

## 2022-06-08 PROCEDURE — 82962 GLUCOSE BLOOD TEST: CPT

## 2022-06-08 PROCEDURE — 25010000002 PROPOFOL 10 MG/ML EMULSION: Performed by: ANESTHESIOLOGY

## 2022-06-08 PROCEDURE — 88305 TISSUE EXAM BY PATHOLOGIST: CPT | Performed by: INTERNAL MEDICINE

## 2022-06-08 PROCEDURE — 43235 EGD DIAGNOSTIC BRUSH WASH: CPT | Performed by: INTERNAL MEDICINE

## 2022-06-08 PROCEDURE — 45385 COLONOSCOPY W/LESION REMOVAL: CPT | Performed by: INTERNAL MEDICINE

## 2022-06-08 RX ORDER — GLYCOPYRROLATE 0.2 MG/ML
INJECTION INTRAMUSCULAR; INTRAVENOUS AS NEEDED
Status: DISCONTINUED | OUTPATIENT
Start: 2022-06-08 | End: 2022-06-08 | Stop reason: SURG

## 2022-06-08 RX ORDER — MAGNESIUM HYDROXIDE 1200 MG/15ML
LIQUID ORAL AS NEEDED
Status: DISCONTINUED | OUTPATIENT
Start: 2022-06-08 | End: 2022-06-08 | Stop reason: HOSPADM

## 2022-06-08 RX ORDER — PROPOFOL 10 MG/ML
VIAL (ML) INTRAVENOUS AS NEEDED
Status: DISCONTINUED | OUTPATIENT
Start: 2022-06-08 | End: 2022-06-08 | Stop reason: SURG

## 2022-06-08 RX ORDER — SODIUM CHLORIDE 0.9 % (FLUSH) 0.9 %
10 SYRINGE (ML) INJECTION AS NEEDED
Status: DISCONTINUED | OUTPATIENT
Start: 2022-06-08 | End: 2022-06-08 | Stop reason: HOSPADM

## 2022-06-08 RX ORDER — SODIUM CHLORIDE, SODIUM LACTATE, POTASSIUM CHLORIDE, CALCIUM CHLORIDE 600; 310; 30; 20 MG/100ML; MG/100ML; MG/100ML; MG/100ML
9 INJECTION, SOLUTION INTRAVENOUS CONTINUOUS
Status: DISCONTINUED | OUTPATIENT
Start: 2022-06-08 | End: 2022-06-08 | Stop reason: HOSPADM

## 2022-06-08 RX ORDER — DEXMEDETOMIDINE HYDROCHLORIDE 100 UG/ML
INJECTION, SOLUTION INTRAVENOUS AS NEEDED
Status: DISCONTINUED | OUTPATIENT
Start: 2022-06-08 | End: 2022-06-08 | Stop reason: SURG

## 2022-06-08 RX ORDER — SODIUM CHLORIDE, SODIUM LACTATE, POTASSIUM CHLORIDE, CALCIUM CHLORIDE 600; 310; 30; 20 MG/100ML; MG/100ML; MG/100ML; MG/100ML
30 INJECTION, SOLUTION INTRAVENOUS CONTINUOUS PRN
Status: DISCONTINUED | OUTPATIENT
Start: 2022-06-08 | End: 2022-06-08 | Stop reason: HOSPADM

## 2022-06-08 RX ORDER — SODIUM CHLORIDE 0.9 % (FLUSH) 0.9 %
10 SYRINGE (ML) INJECTION EVERY 12 HOURS SCHEDULED
Status: DISCONTINUED | OUTPATIENT
Start: 2022-06-08 | End: 2022-06-08 | Stop reason: HOSPADM

## 2022-06-08 RX ORDER — SODIUM CHLORIDE 9 MG/ML
40 INJECTION, SOLUTION INTRAVENOUS AS NEEDED
Status: DISCONTINUED | OUTPATIENT
Start: 2022-06-08 | End: 2022-06-08 | Stop reason: HOSPADM

## 2022-06-08 RX ORDER — LIDOCAINE HYDROCHLORIDE 10 MG/ML
0.5 INJECTION, SOLUTION EPIDURAL; INFILTRATION; INTRACAUDAL; PERINEURAL ONCE AS NEEDED
Status: DISCONTINUED | OUTPATIENT
Start: 2022-06-08 | End: 2022-06-08 | Stop reason: HOSPADM

## 2022-06-08 RX ORDER — LIDOCAINE HYDROCHLORIDE 20 MG/ML
INJECTION, SOLUTION INFILTRATION; PERINEURAL AS NEEDED
Status: DISCONTINUED | OUTPATIENT
Start: 2022-06-08 | End: 2022-06-08 | Stop reason: SURG

## 2022-06-08 RX ORDER — SODIUM CHLORIDE 0.9 % (FLUSH) 0.9 %
3 SYRINGE (ML) INJECTION EVERY 12 HOURS SCHEDULED
Status: DISCONTINUED | OUTPATIENT
Start: 2022-06-08 | End: 2022-06-08 | Stop reason: HOSPADM

## 2022-06-08 RX ADMIN — LIDOCAINE HYDROCHLORIDE 100 MG: 20 INJECTION, SOLUTION INFILTRATION; PERINEURAL at 09:19

## 2022-06-08 RX ADMIN — LIDOCAINE HYDROCHLORIDE 100 MG: 20 INJECTION, SOLUTION INFILTRATION; PERINEURAL at 09:22

## 2022-06-08 RX ADMIN — SODIUM CHLORIDE, POTASSIUM CHLORIDE, SODIUM LACTATE AND CALCIUM CHLORIDE: 600; 310; 30; 20 INJECTION, SOLUTION INTRAVENOUS at 09:19

## 2022-06-08 RX ADMIN — DEXMEDETOMIDINE 12 MCG: 100 INJECTION, SOLUTION, CONCENTRATE INTRAVENOUS at 09:19

## 2022-06-08 RX ADMIN — PROPOFOL 200 MG: 10 INJECTION, EMULSION INTRAVENOUS at 09:19

## 2022-06-08 RX ADMIN — GLYCOPYRROLATE 0.1 MG: 0.2 INJECTION INTRAMUSCULAR; INTRAVENOUS at 09:19

## 2022-06-08 NOTE — ANESTHESIA POSTPROCEDURE EVALUATION
Patient: Candi Del Rio    Procedure Summary     Date: 06/08/22 Room / Location: [facility] ENDOSCOPY 2 /  [facility]    Anesthesia Start: 0915 Anesthesia Stop: 0942    Procedures:       ESOPHAGOGASTRODUODENOSCOPY (N/A Esophagus)      COLONOSCOPY WITH POLYPECTOMY (N/A ) Diagnosis:       Encounter for screening for malignant neoplasm of colon      Preoperative clearance      History of colon polyps      Family history of colon cancer in father      (Encounter for screening for malignant neoplasm of colon [Z12.11])      (Preoperative clearance [Z01.818])      (History of colon polyps [Z86.010])      (Family history of colon cancer in father [Z80.0])    Surgeons: Joe Mckeon MD Provider: Janeth Carrington MD    Anesthesia Type: MAC ASA Status: 3          Anesthesia Type: MAC    Vitals  No vitals data found for the desired time range.          Post Anesthesia Care and Evaluation    Patient location during evaluation: PHASE II  Patient participation: complete - patient participated  Level of consciousness: awake and alert  Pain score: 0  Pain management: adequate    Airway patency: patent  Anesthetic complications: No anesthetic complications  PONV Status: none  Cardiovascular status: acceptable  Respiratory status: acceptable  Hydration status: acceptable

## 2022-06-08 NOTE — ANESTHESIA PREPROCEDURE EVALUATION
Anesthesia Evaluation     Patient summary reviewed and Nursing notes reviewed   NPO Solid Status: > 8 hours  NPO Liquid Status: > 8 hours           Airway   Mallampati: I  TM distance: >3 FB  Neck ROM: full  no difficulty expected  Dental - normal exam         Pulmonary - normal exam   Sleep apnea: undiagnosed   Cardiovascular   Exercise tolerance: good (4-7 METS)    ECG reviewed  Rhythm: regular  Rate: normal    (+) dysrhythmias ( questionable afib during syncopal episode. not seen on cardiology workup),       Neuro/Psych  (+) seizures ( x 1 2021 r/t hypoglycemia), syncope ( 2021. cardiology workup was negative.), psychiatric history (sleep apnea test negative) Anxiety,    GI/Hepatic/Renal/Endo    (+) obesity, morbid obesity,  diabetes mellitus well controlled,     Musculoskeletal (-) negative ROS    Abdominal  - normal exam   Substance History - negative use     OB/GYN          Other - negative ROS       ROS/Med Hx Other: Narrative & Impression      HEART RATE= 82  bpm  RR Interval= 728  ms  HI Interval= 190  ms  P Horizontal Axis= -4  deg  P Front Axis= 41  deg  QRSD Interval= 81  ms  QT Interval= 356  ms  QRS Axis= 49  deg  T Wave Axis= 1  deg  - ABNORMAL ECG -  Sinus rhythm  Ventricular premature complex  Borderline T abnormalities, anterior leads  NO PRIOR TRACING AVAILABLE FOR COMPARISON  Electronically Signed By: Bethanie Leonard ([facility]) 03-Jun-2022 15:41:08  Date and Time of Study: 2022-06-02 11:40:21    Specimen Collected: 06/02/22 11:40    Interpretation Summary    · Calculated left ventricular EF = 55.8% Estimated left ventricular EF = 56% Estimated left ventricular EF was in agreement with the calculated left ventricular EF. Left ventricular systolic function is normal. Normal left ventricular cavity size and wall thickness noted. All left ventricular wall segments contract normally. Left ventricular diastolic function was normal.  · Trace mitral valve regurgitation is present.  · Trace tricuspid valve  regurgitation is present. Estimated right ventricular systolic pressure from tricuspid regurgitation is normal (<35 mmHg). Calculated right ventricular systolic pressure from tricuspid regurgitation is 18 mmHg.  · Saline test results are positive with valsalva manuever. This probable small patent foramen ovale with moderate size right to left shunting noted with Valsalva with saline injection                               Anesthesia Plan    ASA 3     MAC     intravenous induction     Anesthetic plan, risks, benefits, and alternatives have been provided, discussed and informed consent has been obtained with: patient.  Use of blood products discussed with consented to blood products.   Plan discussed with CRNA.

## 2022-06-08 NOTE — H&P
No chief complaint on file.      HPI  Patient today for colonoscopy for high rescreening due to family history of colon cancer in her father and also here for an EGD for GERD and for evaluation for possible bariatric surgery.         Problem List:    Patient Active Problem List   Diagnosis   • Vitamin D deficiency   • Back ache   • Anxiety   • Glaucoma   • Obstructive sleep apnea, adult   • 1st degree AV block   • Paroxysmal atrial fibrillation (HCC)   • Syncope and collapse   • Vitamin B12 deficiency   • Type 2 diabetes mellitus with hyperglycemia (HCC)   • Encounter for screening for malignant neoplasm of colon   • Preoperative clearance   • History of colon polyps   • Family history of colon cancer in father   • Menometrorrhagia   • Thickened endometrium   • Cervical stenosis (uterine cervix)       Medical History:    Past Medical History:   Diagnosis Date   • 1st degree AV block    • Abnormal uterine bleeding    • Anxiety    • Colon polyps    • Fibroid    • Glaucoma    • History of transfusion     as    • Iron deficiency anemia    • Morbidly obese (HCC)    • APRIL (obstructive sleep apnea)     had normal sleep study per pt   • Paroxysmal A-fib (HCC)     dx after syncopal episode 2021, saw cardiology and had normal testing   • Slow to wake up after anesthesia     after first  and lap ann   • Syncope and collapse 2021    saw neuro, questionable seizure, had work up   • Type 2 diabetes mellitus with hyperglycemia (HCC) 2019   • Vitamin B12 deficiency    • Vitamin D deficiency         Social History:    Social History     Socioeconomic History   • Marital status:      Spouse name: WILL   • Number of children: 2   Tobacco Use   • Smoking status: Never Smoker   • Smokeless tobacco: Never Used   Vaping Use   • Vaping Use: Never used   Substance and Sexual Activity   • Alcohol use: No   • Drug use: No   • Sexual activity: Yes     Partners: Male     Birth control/protection: Surgical      Comment: BTL       Family History:   Family History   Problem Relation Age of Onset   • Irritable bowel syndrome Mother    • Diabetes Mother    • Hypertension Mother    • Colon cancer Father 70   • Diabetes Father    • Hypertension Father    • Colon polyps Father    • Diabetes Sister    • Diabetes Brother    • Prostate cancer Maternal Grandfather    • Seizures Niece    • Seizures Other    • Breast cancer Neg Hx    • Ovarian cancer Neg Hx    • Uterine cancer Neg Hx    • Deep vein thrombosis Neg Hx    • Pulmonary embolism Neg Hx    • Crohn's disease Neg Hx    • Ulcerative colitis Neg Hx    • Malig Hyperthermia Neg Hx        Surgical History:   Past Surgical History:   Procedure Laterality Date   •  SECTION      x2   • CHOLECYSTECTOMY     • COLONOSCOPY N/A 2016    Procedure: COLONOSCOPY w/ polypectomy;  Surgeon: Darlene Vargas MD;  Location: [facility];  Service:    • ENDOMETRIAL ABLATION     • TUBAL ABDOMINAL LIGATION         No current facility-administered medications for this encounter.    Allergies: No Known Allergies       Review of Systems   Pertinent items are noted in HPI      The following portions of the patient's history were reviewed by me and updated as appropriate: review of systems, allergies, current medications, past family history, past medical history, past social history, past surgical history and problem list.    LMP 2022 (Approximate)     PHYSICAL EXAM:  /78   Pulse 88   Temp 98.4 °F (36.9 °C) (Oral)   Resp 16   Wt 117 kg (257 lb 12.8 oz)   LMP 2022 (Approximate)   SpO2 99%   BMI 47.15 kg/m²   CONSTITUTIONAL:  today's vital signs reviewed by me  GASTROINTESTINAL: abdomen is soft nontender nondistended with normal active bowel sounds, no masses are appreciated    Debilities: None  Emotional Behavior: Appropriate    Assessment/ Plan  We will proceed today with EGD and colonoscopy.    Risks and benefits as well as alternatives to endoscopic evaluation were  explained to the patient and they voiced understanding and wish to proceed.  These risks include but are not limited to the risk of bleeding, perforation, adverse reaction to sedation, and missed lesions.  The patient was given the opportunity to ask questions prior to the endoscopic procedure.

## 2022-06-08 NOTE — ADDENDUM NOTE
Addendum  created 06/08/22 1016 by Janeth Carrington MD    Attestation recorded in Intraprocedure, Flowsheet accepted, Intraprocedure Attestations filed

## 2022-06-09 LAB
LAB AP CASE REPORT: NORMAL
PATH REPORT.FINAL DX SPEC: NORMAL
PATH REPORT.GROSS SPEC: NORMAL

## 2022-06-11 ENCOUNTER — LAB (OUTPATIENT)
Dept: LAB | Facility: HOSPITAL | Age: 47
End: 2022-06-11

## 2022-06-11 DIAGNOSIS — R93.89 THICKENED ENDOMETRIUM: ICD-10-CM

## 2022-06-11 DIAGNOSIS — N92.1 MENOMETRORRHAGIA: ICD-10-CM

## 2022-06-11 DIAGNOSIS — N88.2 CERVICAL STENOSIS (UTERINE CERVIX): ICD-10-CM

## 2022-07-05 DIAGNOSIS — E11.9 DIABETES MELLITUS TYPE 2, NONINSULIN DEPENDENT: Primary | ICD-10-CM

## 2022-07-05 DIAGNOSIS — E78.1 HYPERTRIGLYCERIDEMIA: ICD-10-CM

## 2022-07-05 DIAGNOSIS — I10 ESSENTIAL HYPERTENSION: ICD-10-CM

## 2022-07-16 ENCOUNTER — LAB (OUTPATIENT)
Dept: LAB | Facility: HOSPITAL | Age: 47
End: 2022-07-16

## 2022-07-16 DIAGNOSIS — Z01.818 PREOPERATIVE EXAM FOR GYNECOLOGIC SURGERY: ICD-10-CM

## 2022-07-25 ENCOUNTER — TELEPHONE (OUTPATIENT)
Dept: OBSTETRICS AND GYNECOLOGY | Facility: CLINIC | Age: 47
End: 2022-07-25

## 2022-07-25 NOTE — TELEPHONE ENCOUNTER
I spoke to the patient and she states she didn't show because she wasn't feeling well, took a home covid test and it was positive. She still isn't feeling well and will call back to reschedule.

## 2022-08-13 DIAGNOSIS — E55.9 VITAMIN D DEFICIENCY: ICD-10-CM

## 2022-08-15 RX ORDER — ERGOCALCIFEROL 1.25 MG/1
CAPSULE ORAL
Qty: 36 CAPSULE | Refills: 2 | OUTPATIENT
Start: 2022-08-15

## 2022-08-24 ENCOUNTER — OFFICE VISIT (OUTPATIENT)
Dept: INTERNAL MEDICINE | Facility: CLINIC | Age: 47
End: 2022-08-24

## 2022-08-24 VITALS
SYSTOLIC BLOOD PRESSURE: 126 MMHG | OXYGEN SATURATION: 97 % | WEIGHT: 258 LBS | BODY MASS INDEX: 47.48 KG/M2 | TEMPERATURE: 97.7 F | HEART RATE: 84 BPM | HEIGHT: 62 IN | DIASTOLIC BLOOD PRESSURE: 82 MMHG

## 2022-08-24 DIAGNOSIS — H10.33 ACUTE BACTERIAL CONJUNCTIVITIS OF BOTH EYES: ICD-10-CM

## 2022-08-24 DIAGNOSIS — J02.9 SORE THROAT: ICD-10-CM

## 2022-08-24 DIAGNOSIS — H66.001 NON-RECURRENT ACUTE SUPPURATIVE OTITIS MEDIA OF RIGHT EAR WITHOUT SPONTANEOUS RUPTURE OF TYMPANIC MEMBRANE: Primary | ICD-10-CM

## 2022-08-24 LAB
EXPIRATION DATE: NORMAL
INTERNAL CONTROL: NORMAL
Lab: NORMAL
SARS-COV-2 AG UPPER RESP QL IA.RAPID: NOT DETECTED

## 2022-08-24 PROCEDURE — 87426 SARSCOV CORONAVIRUS AG IA: CPT | Performed by: NURSE PRACTITIONER

## 2022-08-24 PROCEDURE — 99213 OFFICE O/P EST LOW 20 MIN: CPT | Performed by: NURSE PRACTITIONER

## 2022-08-24 RX ORDER — CEFDINIR 300 MG/1
300 CAPSULE ORAL 2 TIMES DAILY
Qty: 14 CAPSULE | Refills: 0 | Status: SHIPPED | OUTPATIENT
Start: 2022-08-24 | End: 2022-08-31

## 2022-08-24 RX ORDER — FLUCONAZOLE 150 MG/1
150 TABLET ORAL ONCE
Qty: 1 TABLET | Refills: 0 | Status: SHIPPED | OUTPATIENT
Start: 2022-08-24 | End: 2022-08-24

## 2022-08-24 NOTE — PROGRESS NOTES
Subjective   Candi Del Rio is a 46 y.o. female.     Chief Complaint   Patient presents with   • Earache     Ear pain right ear  she has pink eye as well         History of Present Illness   She is here today with complaints of right ear pain.  She was seen in urgent care yesterday and diagnosed with viral conjunctivitis. Symptoms started Monday with right eye yellow drainage and irritation. She then developed right ear pain and pressure and right side sore throat yesterday.  The pain became worse last night.  She notes right ear pressure and decreased hearing.  She has been using a warm compress without improvement.  She was prescribed an antibiotic eyedrop by urgent care but she has not picked this up yet.  She has tried over-the-counter Pataday eyedrops but this caused burning.  She recently had COVID 3 weeks ago.    The following portions of the patient's history were reviewed and updated as appropriate: allergies, current medications, past family history, past medical history, past social history, past surgical history and problem list.    Review of Systems   Constitutional: Positive for fatigue. Negative for chills and fever.   HENT: Positive for congestion, ear pain, hearing loss and sore throat. Negative for ear discharge, postnasal drip, rhinorrhea, sinus pressure and trouble swallowing.    Eyes: Positive for discharge and redness. Negative for blurred vision, pain and itching.   Respiratory: Negative.    Cardiovascular: Negative.    Neurological: Negative for headache.       Objective   Physical Exam  Constitutional:       General: She is awake.      Appearance: She is well-developed. She is ill-appearing.   HENT:      Head: Normocephalic and atraumatic.      Right Ear: Decreased hearing noted. No drainage. Tympanic membrane is erythematous and bulging. Tympanic membrane is not perforated.      Left Ear: Hearing, tympanic membrane, ear canal and external ear normal.      Nose: Rhinorrhea present.  Rhinorrhea is clear.      Right Sinus: No maxillary sinus tenderness or frontal sinus tenderness.      Left Sinus: No maxillary sinus tenderness or frontal sinus tenderness.      Mouth/Throat:      Lips: Pink.      Mouth: Mucous membranes are moist. No injury or oral lesions.      Dentition: Normal dentition.      Tongue: No lesions. Tongue does not deviate from midline.      Palate: No mass and lesions.      Pharynx: Oropharynx is clear. Uvula midline.   Eyes:      General: Lids are normal. Vision grossly intact.      Extraocular Movements: Extraocular movements intact.      Conjunctiva/sclera:      Right eye: Right conjunctiva is injected. Exudate (yellow drainage) present. No hemorrhage.     Left eye: Left conjunctiva is not injected. Exudate ( yellow drainage) present. No hemorrhage.  Neck:      Thyroid: No thyroid mass, thyromegaly or thyroid tenderness.      Vascular: No carotid bruit.      Trachea: Trachea normal.   Cardiovascular:      Rate and Rhythm: Normal rate and regular rhythm.      Chest Wall: PMI is not displaced.      Pulses:           Radial pulses are 2+ on the right side and 2+ on the left side.        Dorsalis pedis pulses are 2+ on the right side and 2+ on the left side.        Posterior tibial pulses are 2+ on the right side and 2+ on the left side.      Heart sounds: S1 normal and S2 normal.   Pulmonary:      Effort: Pulmonary effort is normal.      Breath sounds: Normal breath sounds.   Musculoskeletal:      Right lower leg: No edema.      Left lower leg: No edema.   Lymphadenopathy:      Head:      Right side of head: No submental, submandibular, tonsillar or occipital adenopathy.      Left side of head: No submental, submandibular, tonsillar or occipital adenopathy.      Cervical: No cervical adenopathy.   Skin:     General: Skin is warm and dry.      Capillary Refill: Capillary refill takes less than 2 seconds.      Nails: There is no clubbing.   Neurological:      Mental Status: She is  alert and oriented to person, place, and time.   Psychiatric:         Attention and Perception: Attention normal.         Mood and Affect: Mood and affect normal.         Speech: Speech normal.         Behavior: Behavior normal. Behavior is cooperative.         Thought Content: Thought content normal.         Cognition and Memory: Cognition normal.         Vitals:    08/24/22 1117   BP: 126/82   Pulse: 84   Temp: 97.7 °F (36.5 °C)   SpO2: 97%      Body mass index is 47.18 kg/m².    Assessment & Plan   Diagnoses and all orders for this visit:    1. Non-recurrent acute suppurative otitis media of right ear without spontaneous rupture of tympanic membrane (Primary)  -     cefdinir (OMNICEF) 300 MG capsule; Take 1 capsule by mouth 2 (Two) Times a Day for 7 days.  Dispense: 14 capsule; Refill: 0    2. Sore throat  -     POCT SARS-CoV-2 Antigen YURIY    3. Acute bacterial conjunctivitis of both eyes    Other orders  -     fluconazole (Diflucan) 150 MG tablet; Take 1 tablet by mouth 1 (One) Time for 1 dose.  Dispense: 1 tablet; Refill: 0      1.  Acute otitis media of the right ear-rapid COVID test negative today in office.  Start cefdinir 300 mg twice daily x7 days.  Recommend that she start a nasal steroid spray 1 to 2 puffs in each nostril daily.  Hydrate well with fluids, warm compress, Tylenol or ibuprofen as needed for analgesia.  Start a probiotic separate from the antibiotic and continue this for 2 weeks after completion.  We will send in a prescription for Diflucan 150 mg once if she develops yeast infection.  Notify for worsening symptoms.  2.  Acute bacterial conjunctivitis of both eyes-recommend she start the antibiotic eyedrops in both eyes as prescribed by urgent care.  Avoid touching the eyes.  Encourage good handwashing techniques, avoiding eye make-up and no contact lens use.

## 2022-11-07 DIAGNOSIS — E11.9 DIABETES MELLITUS TYPE 2, NONINSULIN DEPENDENT: ICD-10-CM

## 2022-11-16 ENCOUNTER — OFFICE VISIT (OUTPATIENT)
Dept: ENDOCRINOLOGY | Age: 47
End: 2022-11-16

## 2022-11-16 VITALS
WEIGHT: 269.4 LBS | HEIGHT: 62 IN | HEART RATE: 86 BPM | TEMPERATURE: 97.9 F | OXYGEN SATURATION: 98 % | SYSTOLIC BLOOD PRESSURE: 138 MMHG | BODY MASS INDEX: 49.58 KG/M2 | DIASTOLIC BLOOD PRESSURE: 70 MMHG

## 2022-11-16 DIAGNOSIS — Z91.199 NON COMPLIANCE WITH MEDICAL TREATMENT: ICD-10-CM

## 2022-11-16 DIAGNOSIS — E11.65 TYPE 2 DIABETES MELLITUS WITH HYPERGLYCEMIA, UNSPECIFIED WHETHER LONG TERM INSULIN USE: Primary | ICD-10-CM

## 2022-11-16 PROBLEM — Z12.11 ENCOUNTER FOR SCREENING FOR MALIGNANT NEOPLASM OF COLON: Status: RESOLVED | Noted: 2022-04-05 | Resolved: 2022-11-16

## 2022-11-16 PROBLEM — Z01.818 PREOPERATIVE CLEARANCE: Status: RESOLVED | Noted: 2022-04-05 | Resolved: 2022-11-16

## 2022-11-16 LAB — GLUCOSE BLDC GLUCOMTR-MCNC: 218 MG/DL (ref 70–130)

## 2022-11-16 PROCEDURE — 99213 OFFICE O/P EST LOW 20 MIN: CPT | Performed by: INTERNAL MEDICINE

## 2022-11-16 PROCEDURE — 82962 GLUCOSE BLOOD TEST: CPT | Performed by: INTERNAL MEDICINE

## 2022-11-16 RX ORDER — GLIMEPIRIDE 2 MG/1
TABLET ORAL
Qty: 180 TABLET | Refills: 2 | Status: SHIPPED | OUTPATIENT
Start: 2022-11-16 | End: 2023-03-20 | Stop reason: SDUPTHER

## 2022-11-16 RX ORDER — METFORMIN HYDROCHLORIDE 500 MG/1
TABLET, EXTENDED RELEASE ORAL
Qty: 360 TABLET | Refills: 2 | Status: SHIPPED | OUTPATIENT
Start: 2022-11-16

## 2022-11-16 NOTE — PATIENT INSTRUCTIONS
As discussed will update labs at this time and determine if a dose change is needed with any of the medications and will then address.  I expect the Ozempic will need to be restarted if not proceed to insulin.  Either way once the plan is setup you need to continue that treatment plan till see next time to then determine if it is working or not.      Labs to be done at least 10 days before return appointment.  If labs are not done within 3 days of scheduled return appointment the appointment will be canceled and rescheduled to a later date with requirement for labs to be done as directed.      Bring med list, meter and/or log data to all appointments.

## 2022-11-16 NOTE — PROGRESS NOTES
Candi Del Rio, 47 y.o., Gender: female    Assessment/Plan    1.  Pt with DM Type 2 uncontrolled w/o complications.  A1c 9.6% 3/22, 9% 10/21, 8.1% 6/21.  Counseled that conservative goal A1c is to be <7 % and aggressive 6.5 %.  Counseled pt that overall risk with diabetes is related to long term complications of both small and large blood vessels and that the risk for CAD, MI, and stroke is much higher than general population.  Counseled pt on long term effects of prolonged poor control of diabetes.  Counseled that the goal of tx is prevention of further complications.  Counseled on lifestyle change as a key part of this process to improve all parameters related to diabetes.  Pt counseled on how to do freq bg checks with fasting AM, before meals, before bed, occasionally 2 hours after a meal, and at 2-3 am if awake.  Pt is not checking enough as directed based on current treatment plan to check 0-2 times a day.  Rec cont Metformin XR at 1000 mg twice daily after AM and PM meals as that is optimal dose.  Rec cont Amaryl at 2 mg twice daily as better and safer agent in this class of tx.  Pt then voices had yeast infections w/ Jardiance, so SGLT2 not an option.  Again pt was on Ozempic at 0.5 mg and tolerating but pt stopped tx herself for unclear reason as sounds like it was working at the time w/o any further GI c/o.  Feel that as pt has not consistently been on a stable tx plan for prolonged period to know if non insulin tx will control things or not.  Pt was told to restart Ozempic and also added Actos 15 mg as this combination will offer best option to improve control now that a degree of insulin resistance is noted in labs after visit.  I again feel insulin likely will be needed in the future if pt does not stick to directed tx plan.  Compliance w/ tx and apts is of concern for this pt.  Will have f/u labs before pt returns.  Counseled must keep log and bring to apts to cont tx plan.  Will work to optimize  treatment plan in coming months and get back to PCP.  2.  Hypertension goal of <140/90.  Pt not on tx for renal protection.  Last microalbumin/creatinine ratio urine check not known so will get with next labs.  Dec in GFR noted for first time with mild stage 2.  Will monitor this and as pt has not had the urine test as was ordered unsure if there is actual renal damage, though it is concerning.   3.  Hyperlipidemia LDL goal <100 and trig goal <150.  Pt currently not on directed tx and based on labs 10/21 is not indicated.  Pt did not update labs so have no new data.  Labs after visit showed trigs are slightly elevated but that could be from ongoing high glucose.  Focus remains to improve glucose and if this remains a problem will need to be addressed in future w/ directed tx.   4.  Note visit not started till around 250 as was sig delay getting pt ready.        Time Counseled: 12  Minutes  Total Time: 15  Minutes    Return to office in:   3-4   Months      Random Glucose: 218 mg/dL      HPI Summary    Pt is here for evaluation of DM reported to be Type 2.  Pt seen here once early April then did not do f/u labs and missed at least one apt.  Pt now returns today w/o any recent lab data.  Pt also notes about 2 months ago she opted to stop Ozempic herself after that was restarted back in April and by her report sounds like glucose was good at the time it was stopped as she says glucose was around 110 which is desired reading.  Pt reports DM since '19.  Pt reports blood glucoses are 135-300 w/ limited record as pt not checking as directed.  Pt reports weight has been stable.  Pt has no report of fatigue.  Pt has no complaint of general muscle weakness.  Pt denies any increased thirst or urination.  Pt denies any chest pain.  Pt denies any shortness of breath.  Pt denies abdominal complaints after change to the XR form of Metformin  Pt denies any early satiety or postprandial bloating.  Pt reports occ blurry vision.  Pt  reports last seen by eye doctor in  not sure when no retinopathy.  Pt reports no problems with feet.  Pt denies any skin wounds.  Pt has no report of depression.  Pt reports good sleep quality without apnea symptoms reports neg sleep study.  Pt reports not trying to follow a diet to lose weight and limited exercise.  Pt reports having pneumonia vaccine in the past and does not get flu shot.  Pt without any other complaints related to diabetes at this time.    Review of Systems  see HPI      Patient History    Past History (reviewed):    Medical:   Past Medical History:   Diagnosis Date   • 1st degree AV block    • Abnormal uterine bleeding    • Anxiety    • Colon polyps    • Fibroid    • Glaucoma    • History of transfusion     as    • Iron deficiency anemia    • Morbidly obese (HCC)    • APRIL (obstructive sleep apnea)     had normal sleep study per pt   • Paroxysmal A-fib (HCC)     dx after syncopal episode 2021, saw cardiology and had normal testing   • Slow to wake up after anesthesia     after first  and lap ann   • Syncope and collapse 2021    saw neuro, questionable seizure, had work up   • Type 2 diabetes mellitus with hyperglycemia (HCC) 2019   • Vitamin B12 deficiency    • Vitamin D deficiency        Surgery:   Past Surgical History:   Procedure Laterality Date   •  SECTION      x2   • CHOLECYSTECTOMY     • COLONOSCOPY N/A 2016    Procedure: COLONOSCOPY w/ polypectomy;  Surgeon: Darlene Vargas MD;  Location: [facility] OR;  Service:    • COLONOSCOPY W/ POLYPECTOMY N/A 2022    Procedure: COLONOSCOPY WITH POLYPECTOMY;  Surgeon: Joe Mckeon MD;  Location: [facility] OR;  Service: Gastroenterology;  Laterality: N/A;  ascending colon polyp (cold snare)   • ENDOMETRIAL ABLATION     • ENDOSCOPY N/A 2022    Procedure: ESOPHAGOGASTRODUODENOSCOPY;  Surgeon: Joe Mckeon MD;  Location: [facility] OR;  Service: Gastroenterology;  Laterality: N/A;  normal exam   •  TUBAL ABDOMINAL LIGATION           Social History (reviewed):  - Smoking, - ETOH, - Drugs, , 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    Allergies:  No Known Allergies    Physical Exam    VITALS:    Vitals:    11/16/22 1436   BP: 138/70   Pulse: 86   Temp: 97.9 °F (36.6 °C)   SpO2: 98%     Body mass index is 49.26 kg/m².    GENERAL:  Looks stated age, Morbidly obese  HEAD/EYES:  N/C, A/T, Mask in place  EXTREMITIES:  FROM  CNS:  A&Ox3    Full exam not done today.      Labs/Imaging  All available lab and imaging data were reviewed.        Melo Briones MD, CE, FACE, ECNU  11/16/2022  15:03 EST

## 2022-11-17 LAB
ALBUMIN SERPL-MCNC: 4 G/DL (ref 3.5–5.2)
ALBUMIN/GLOB SERPL: 1.5 G/DL
ALP SERPL-CCNC: 60 U/L (ref 39–117)
ALT SERPL-CCNC: 13 U/L (ref 1–33)
AST SERPL-CCNC: 12 U/L (ref 1–32)
BILIRUB SERPL-MCNC: <0.2 MG/DL (ref 0–1.2)
BUN SERPL-MCNC: 9 MG/DL (ref 6–20)
BUN/CREAT SERPL: 11 (ref 7–25)
C PEPTIDE SERPL-MCNC: 5.7 NG/ML (ref 1.1–4.4)
CALCIUM SERPL-MCNC: 9.4 MG/DL (ref 8.6–10.5)
CHLORIDE SERPL-SCNC: 103 MMOL/L (ref 98–107)
CHOLEST SERPL-MCNC: 169 MG/DL (ref 0–200)
CO2 SERPL-SCNC: 28.1 MMOL/L (ref 22–29)
CREAT SERPL-MCNC: 0.82 MG/DL (ref 0.57–1)
EGFRCR SERPLBLD CKD-EPI 2021: 88.9 ML/MIN/1.73
GLOBULIN SER CALC-MCNC: 2.7 GM/DL
GLUCOSE SERPL-MCNC: 209 MG/DL (ref 65–99)
HBA1C MFR BLD: 8.7 % (ref 4.8–5.6)
HDLC SERPL-MCNC: 52 MG/DL (ref 40–60)
IMP & REVIEW OF LAB RESULTS: NORMAL
INSULIN SERPL-ACNC: 51.6 UIU/ML (ref 2.6–24.9)
LDLC SERPL CALC-MCNC: 87 MG/DL (ref 0–100)
POTASSIUM SERPL-SCNC: 4.2 MMOL/L (ref 3.5–5.2)
PROT SERPL-MCNC: 6.7 G/DL (ref 6–8.5)
SODIUM SERPL-SCNC: 140 MMOL/L (ref 136–145)
TRIGL SERPL-MCNC: 175 MG/DL (ref 0–150)
TSH SERPL DL<=0.005 MIU/L-ACNC: 1.73 UIU/ML (ref 0.27–4.2)
VLDLC SERPL CALC-MCNC: 30 MG/DL (ref 5–40)

## 2022-11-17 RX ORDER — SEMAGLUTIDE 1.34 MG/ML
INJECTION, SOLUTION SUBCUTANEOUS
Qty: 4.5 ML | Refills: 1 | Status: SHIPPED | OUTPATIENT
Start: 2022-11-17

## 2022-11-17 RX ORDER — PIOGLITAZONEHYDROCHLORIDE 15 MG/1
TABLET ORAL
Qty: 90 TABLET | Refills: 2 | Status: SHIPPED | OUTPATIENT
Start: 2022-11-17 | End: 2023-03-16

## 2023-01-25 LAB
ALBUMIN SERPL-MCNC: 3.8 G/DL (ref 3.5–5.2)
ALBUMIN/GLOB SERPL: 1.3 G/DL
ALP SERPL-CCNC: 57 U/L (ref 39–117)
ALT SERPL-CCNC: 10 U/L (ref 1–33)
AST SERPL-CCNC: 9 U/L (ref 1–32)
BASOPHILS # BLD AUTO: 0.01 10*3/MM3 (ref 0–0.2)
BASOPHILS NFR BLD AUTO: 0.2 % (ref 0–1.5)
BILIRUB SERPL-MCNC: 0.3 MG/DL (ref 0–1.2)
BUN SERPL-MCNC: 9 MG/DL (ref 6–20)
BUN/CREAT SERPL: 11.1 (ref 7–25)
CALCIUM SERPL-MCNC: 9 MG/DL (ref 8.6–10.5)
CHLORIDE SERPL-SCNC: 104 MMOL/L (ref 98–107)
CHOLEST SERPL-MCNC: 141 MG/DL (ref 0–200)
CHOLEST/HDLC SERPL: 2.94 {RATIO}
CO2 SERPL-SCNC: 26.1 MMOL/L (ref 22–29)
CREAT SERPL-MCNC: 0.81 MG/DL (ref 0.57–1)
EGFRCR SERPLBLD CKD-EPI 2021: 90.2 ML/MIN/1.73
EOSINOPHIL # BLD AUTO: 0.12 10*3/MM3 (ref 0–0.4)
EOSINOPHIL NFR BLD AUTO: 2.6 % (ref 0.3–6.2)
ERYTHROCYTE [DISTWIDTH] IN BLOOD BY AUTOMATED COUNT: 13.7 % (ref 12.3–15.4)
GLOBULIN SER CALC-MCNC: 2.9 GM/DL
GLUCOSE SERPL-MCNC: 150 MG/DL (ref 65–99)
HBA1C MFR BLD: 8 % (ref 4.8–5.6)
HCT VFR BLD AUTO: 36.5 % (ref 34–46.6)
HDLC SERPL-MCNC: 48 MG/DL (ref 40–60)
HGB BLD-MCNC: 11.7 G/DL (ref 12–15.9)
IMM GRANULOCYTES # BLD AUTO: 0.01 10*3/MM3 (ref 0–0.05)
IMM GRANULOCYTES NFR BLD AUTO: 0.2 % (ref 0–0.5)
LDLC SERPL CALC-MCNC: 68 MG/DL (ref 0–100)
LYMPHOCYTES # BLD AUTO: 1.59 10*3/MM3 (ref 0.7–3.1)
LYMPHOCYTES NFR BLD AUTO: 33.9 % (ref 19.6–45.3)
MCH RBC QN AUTO: 28.9 PG (ref 26.6–33)
MCHC RBC AUTO-ENTMCNC: 32.1 G/DL (ref 31.5–35.7)
MCV RBC AUTO: 90.1 FL (ref 79–97)
MONOCYTES # BLD AUTO: 0.42 10*3/MM3 (ref 0.1–0.9)
MONOCYTES NFR BLD AUTO: 9 % (ref 5–12)
NEUTROPHILS # BLD AUTO: 2.54 10*3/MM3 (ref 1.7–7)
NEUTROPHILS NFR BLD AUTO: 54.1 % (ref 42.7–76)
NRBC BLD AUTO-RTO: 0 /100 WBC (ref 0–0.2)
PLATELET # BLD AUTO: 294 10*3/MM3 (ref 140–450)
POTASSIUM SERPL-SCNC: 4.1 MMOL/L (ref 3.5–5.2)
PROT SERPL-MCNC: 6.7 G/DL (ref 6–8.5)
RBC # BLD AUTO: 4.05 10*6/MM3 (ref 3.77–5.28)
SODIUM SERPL-SCNC: 139 MMOL/L (ref 136–145)
TRIGL SERPL-MCNC: 147 MG/DL (ref 0–150)
VLDLC SERPL CALC-MCNC: 25 MG/DL (ref 5–40)
WBC # BLD AUTO: 4.69 10*3/MM3 (ref 3.4–10.8)

## 2023-01-31 ENCOUNTER — OFFICE VISIT (OUTPATIENT)
Dept: INTERNAL MEDICINE | Facility: CLINIC | Age: 48
End: 2023-01-31
Payer: COMMERCIAL

## 2023-01-31 VITALS
HEIGHT: 62 IN | TEMPERATURE: 97.3 F | BODY MASS INDEX: 48.21 KG/M2 | OXYGEN SATURATION: 98 % | DIASTOLIC BLOOD PRESSURE: 78 MMHG | WEIGHT: 262 LBS | HEART RATE: 77 BPM | SYSTOLIC BLOOD PRESSURE: 128 MMHG

## 2023-01-31 DIAGNOSIS — Z00.00 HEALTH CARE MAINTENANCE: Primary | ICD-10-CM

## 2023-01-31 DIAGNOSIS — E53.8 VITAMIN B12 DEFICIENCY: ICD-10-CM

## 2023-01-31 DIAGNOSIS — D50.9 IRON DEFICIENCY ANEMIA, UNSPECIFIED IRON DEFICIENCY ANEMIA TYPE: ICD-10-CM

## 2023-01-31 DIAGNOSIS — E11.65 UNCONTROLLED TYPE 2 DIABETES MELLITUS WITH HYPERGLYCEMIA: ICD-10-CM

## 2023-01-31 PROCEDURE — 99396 PREV VISIT EST AGE 40-64: CPT | Performed by: NURSE PRACTITIONER

## 2023-01-31 NOTE — PROGRESS NOTES
Subjective   Candi Del Rio is a 47 y.o. female.     History of Present Illness   The patient is here today for CPE and lab work F/U. She is feeling ok.     DM2- At last visit ozempic restarted and actos added on. Currently taking 0.25 of ozempic.   She is not exercising or eating healthy.     Anemia-  Has heavy periods, denies blood in stool, no black tarry stools, UTD with GYN  The following portions of the patient's history were reviewed and updated as appropriate: allergies, current medications, past family history, past medical history, past social history, past surgical history and problem list.    Review of Systems   Constitutional: Negative for chills, fatigue and fever.   HENT: Negative for ear pain, rhinorrhea and sore throat.    Eyes: Negative.    Respiratory: Negative for cough and shortness of breath.    Cardiovascular: Negative.    Gastrointestinal: Negative.    Endocrine: Negative for cold intolerance and heat intolerance.   Genitourinary: Negative for breast discharge, breast lump, breast pain, difficulty urinating, dyspareunia, dysuria, flank pain, frequency, genital sores, hematuria, pelvic pain and urgency.   Musculoskeletal: Negative.    Skin: Negative.    Allergic/Immunologic: Negative for environmental allergies and food allergies.   Neurological: Negative.    Hematological: Negative.    Psychiatric/Behavioral: Negative for dysphoric mood and suicidal ideas. The patient is not nervous/anxious.        Objective   Physical Exam  Constitutional:       Appearance: Normal appearance. She is well-developed.      Comments: Body mass index is 47.91 kg/m².     HENT:      Right Ear: Hearing, tympanic membrane, ear canal and external ear normal.      Left Ear: Hearing, tympanic membrane, ear canal and external ear normal.   Eyes:      General: Lids are normal.      Conjunctiva/sclera: Conjunctivae normal.      Pupils: Pupils are equal, round, and reactive to light.   Neck:      Thyroid: No thyroid mass or  thyromegaly.      Vascular: No carotid bruit.      Trachea: Trachea normal.   Cardiovascular:      Rate and Rhythm: Normal rate and regular rhythm.      Pulses: Normal pulses.           Dorsalis pedis pulses are 2+ on the right side and 2+ on the left side.        Posterior tibial pulses are 2+ on the right side and 2+ on the left side.      Heart sounds: Normal heart sounds.   Pulmonary:      Effort: Pulmonary effort is normal.      Breath sounds: Normal breath sounds.   Abdominal:      General: Bowel sounds are normal.      Palpations: Abdomen is soft.      Tenderness: There is no abdominal tenderness.   Musculoskeletal:         General: Normal range of motion.      Cervical back: Normal range of motion.      Right foot: Normal range of motion.      Left foot: Normal range of motion.   Feet:      Right foot:      Protective Sensation: 10 sites tested. 10 sites sensed.      Skin integrity: Dry skin present.      Toenail Condition: Right toenails are normal.      Left foot:      Protective Sensation: 10 sites tested. 10 sites sensed.      Skin integrity: Dry skin present.      Toenail Condition: Left toenails are normal.   Lymphadenopathy:      Cervical: No cervical adenopathy.      Upper Body:      Right upper body: No supraclavicular adenopathy.      Left upper body: No supraclavicular adenopathy.      Lower Body: No right inguinal adenopathy. No left inguinal adenopathy.   Skin:     General: Skin is warm and dry.   Neurological:      Mental Status: She is alert and oriented to person, place, and time.      GCS: GCS eye subscore is 4. GCS verbal subscore is 5. GCS motor subscore is 6.      Cranial Nerves: No cranial nerve deficit.      Sensory: No sensory deficit.      Deep Tendon Reflexes:      Reflex Scores:       Patellar reflexes are 2+ on the right side and 1+ on the left side.  Psychiatric:         Speech: Speech normal.         Behavior: Behavior normal. Behavior is cooperative.         Thought Content:  Thought content normal.         Judgment: Judgment normal.         Vitals:    01/31/23 0836   BP: 128/78   Pulse: 77   Temp: 97.3 °F (36.3 °C)   SpO2: 98%     Body mass index is 47.91 kg/m².  Office Visit on 11/16/2022   Component Date Value Ref Range Status   • Glucose 11/16/2022 218 (A)  70 - 130 mg/dL Final   • C-Peptide 11/16/2022 5.7 (H)  1.1 - 4.4 ng/mL Final    C-Peptide reference interval is for fasting patients.   • Insulin 11/16/2022 51.6 (H)  2.6 - 24.9 uIU/mL Final   • Hemoglobin A1C 11/16/2022 8.70 (H)  4.80 - 5.60 % Final    Comment: Hemoglobin A1C Ranges:  Increased Risk for Diabetes  5.7% to 6.4%  Diabetes                     >= 6.5%  Diabetic Goal                < 7.0%     • Glucose 11/16/2022 209 (H)  65 - 99 mg/dL Final   • BUN 11/16/2022 9  6 - 20 mg/dL Final   • Creatinine 11/16/2022 0.82  0.57 - 1.00 mg/dL Final   • EGFR Result 11/16/2022 88.9  >60.0 mL/min/1.73 Final    Comment: National Kidney Foundation and American Society of  Nephrology (ASN) Task Force recommended calculation based  on the Chronic Kidney Disease Epidemiology Collaboration  (CKD-EPI) equation refit without adjustment for race.  GFR Normal >60  Chronic Kidney Disease <60  Kidney Failure <15     • BUN/Creatinine Ratio 11/16/2022 11.0  7.0 - 25.0 Final   • Sodium 11/16/2022 140  136 - 145 mmol/L Final   • Potassium 11/16/2022 4.2  3.5 - 5.2 mmol/L Final   • Chloride 11/16/2022 103  98 - 107 mmol/L Final   • Total CO2 11/16/2022 28.1  22.0 - 29.0 mmol/L Final   • Calcium 11/16/2022 9.4  8.6 - 10.5 mg/dL Final   • Total Protein 11/16/2022 6.7  6.0 - 8.5 g/dL Final   • Albumin 11/16/2022 4.00  3.50 - 5.20 g/dL Final   • Globulin 11/16/2022 2.7  gm/dL Final   • A/G Ratio 11/16/2022 1.5  g/dL Final   • Total Bilirubin 11/16/2022 <0.2  0.0 - 1.2 mg/dL Final   • Alkaline Phosphatase 11/16/2022 60  39 - 117 U/L Final   • AST (SGOT) 11/16/2022 12  1 - 32 U/L Final   • ALT (SGPT) 11/16/2022 13  1 - 33 U/L Final   • Total Cholesterol  11/16/2022 169  0 - 200 mg/dL Final    Comment: Cholesterol Reference Ranges  (U.S. Department of Health and Human Services ATP III  Classifications)  Desirable          <200 mg/dL  Borderline High    200-239 mg/dL  High Risk          >240 mg/dL  Triglyceride Reference Ranges  (U.S. Department of Health and Human Services ATP III  Classifications)  Normal           <150 mg/dL  Borderline High  150-199 mg/dL  High             200-499 mg/dL  Very High        >500 mg/dL  HDL Reference Ranges  (U.S. Department of Health and Human Services ATP III  Classifications)  Low     <40 mg/dl (major risk factor for CHD)  High    >60 mg/dl ('negative' risk factor for CHD)  LDL Reference Ranges  (U.S. Department of Health and Human Services ATP III  Classifications)  Optimal          <100 mg/dL  Near Optimal     100-129 mg/dL  Borderline High  130-159 mg/dL  High             160-189 mg/dL  Very High        >189 mg/dL     • Triglycerides 11/16/2022 175 (H)  0 - 150 mg/dL Final   • HDL Cholesterol 11/16/2022 52  40 - 60 mg/dL Final   • VLDL Cholesterol Oniel 11/16/2022 30  5 - 40 mg/dL Final   • LDL Chol Calc ([facility]) 11/16/2022 87  0 - 100 mg/dL Final   • TSH 11/16/2022 1.730  0.270 - 4.200 uIU/mL Final   • Interpretation 11/16/2022 Note   Final    Supplemental report is available.     Current Outpatient Medications:   •  glimepiride (Amaryl) 2 MG tablet, By mouth take one tab twice daily with AM and PM meals., Disp: 180 tablet, Rfl: 2  •  glucose blood (True Metrix Blood Glucose Test) test strip, Check 1 time a day not on insulin tx, poor control, hypoglycemia. Dx: E 11.65, Disp: 100 each, Rfl: 4  •  metFORMIN ER (GLUCOPHAGE-XR) 500 MG 24 hr tablet, By mouth take 2 tabs twice daily after AM & PM meals., Disp: 360 tablet, Rfl: 2  •  pioglitazone (Actos) 15 MG tablet, By mouth take one tab daily., Disp: 90 tablet, Rfl: 2  •  Semaglutide,0.25 or 0.5MG/DOS, (Ozempic, 0.25 or 0.5 MG/DOSE,) 2 MG/1.5ML solution pen-injector, Inject 0.25 mg  weekly for two weeks, then increase to 0.5 mg weekly if tolerated without nausea., Disp: 4.5 mL, Rfl: 1  •  TRUEplus Lancets 30G misc, Check 1 time a day not on insulin tx, poor control, hypoglycemia. Dx: E 11.65, Disp: 100 each, Rfl: 4  •  vitamin B-12 (CYANOCOBALAMIN) 1000 MCG tablet, Place 1 tablet under the tongue daily., Disp: , Rfl:   •  vitamin D (ERGOCALCIFEROL) 1.25 MG (36511 UT) capsule capsule, Take 1 capsule by mouth 3 (Three) Times a Week. (Patient taking differently: Take 50,000 Units by mouth Every Other Day.), Disp: 36 capsule, Rfl: 2    Assessment & Plan   Diagnoses and all orders for this visit:    1. Health care maintenance (Primary)    2. Uncontrolled type 2 diabetes mellitus with hyperglycemia (HCC)    3. Vitamin B12 deficiency  -     Ferritin  -     Iron Profile  -     Vitamin B12 & Folate    4. Iron deficiency anemia, unspecified iron deficiency anemia type  -     Ferritin  -     Iron Profile  -     Vitamin B12 & Folate                 1. Preventative counseling- work healthy nutrition and exercise  2. DM2- try ozempic at 0.5 mg weekly, prolonged uncontrolled DM2, will try a CGM- gregg and see if that helps direct nutritional intake  Discussed statin and ACE/ARB  If needed would add on long acting insulin at follow up.   3. Anemia- check ferritin, iron panel and B12/folate panel     “Discussed risks/benefits to vaccination, reviewed components of the vaccine, discussed VIS, discussed informed consent, informed consent obtained. Patient/Parent was allowed to accept or refuse vaccine. Questions answered to satisfactory state of patient/Parent. We reviewed typical age appropriate and seasonally appropriate vaccinations. Reviewed immunization history and updated state vaccination form as needed. Patient was counseled on Hep A  Hep B  Influenza  Prevnar 20

## 2023-02-12 DIAGNOSIS — E55.9 VITAMIN D DEFICIENCY: ICD-10-CM

## 2023-02-13 ENCOUNTER — TELEPHONE (OUTPATIENT)
Dept: INTERNAL MEDICINE | Facility: CLINIC | Age: 48
End: 2023-02-13

## 2023-02-13 DIAGNOSIS — E11.65 UNCONTROLLED TYPE 2 DIABETES MELLITUS WITH HYPERGLYCEMIA: Primary | ICD-10-CM

## 2023-02-13 RX ORDER — LANOLIN ALCOHOL/MO/W.PET/CERES
1000 CREAM (GRAM) TOPICAL DAILY
Qty: 90 TABLET | Refills: 0 | Status: SHIPPED | OUTPATIENT
Start: 2023-02-13 | End: 2023-05-14

## 2023-02-13 RX ORDER — BLOOD-GLUCOSE SENSOR
1 EACH MISCELLANEOUS
Qty: 6 EACH | Refills: 2 | Status: SHIPPED | OUTPATIENT
Start: 2023-02-13 | End: 2023-03-16 | Stop reason: SDUPTHER

## 2023-02-13 RX ORDER — ERGOCALCIFEROL 1.25 MG/1
50000 CAPSULE ORAL 3 TIMES WEEKLY
Qty: 36 CAPSULE | Refills: 2 | Status: SHIPPED | OUTPATIENT
Start: 2023-02-13

## 2023-02-13 NOTE — TELEPHONE ENCOUNTER
Caller: Candi Del Rio    Relationship: Self    Best call back number: [phone]    What was the call regarding: PATIENT STATED THAT TWO WEEKS AGO, [facility] WAS SUPPOSED TO SEND IN A PRESCRIPTION FOR A ROSALBA 2 SCANNER. PATIENT STATED THAT [facility] HAS NOT RECEIVED THE PRESCRIPTION. PLEASE ADVISE.     Do you require a callback: YES

## 2023-03-16 ENCOUNTER — OFFICE VISIT (OUTPATIENT)
Dept: ENDOCRINOLOGY | Age: 48
End: 2023-03-16
Payer: COMMERCIAL

## 2023-03-16 VITALS
HEIGHT: 62 IN | DIASTOLIC BLOOD PRESSURE: 90 MMHG | OXYGEN SATURATION: 100 % | TEMPERATURE: 97.1 F | HEART RATE: 77 BPM | WEIGHT: 264.8 LBS | BODY MASS INDEX: 48.73 KG/M2 | SYSTOLIC BLOOD PRESSURE: 130 MMHG

## 2023-03-16 DIAGNOSIS — E11.65 TYPE 2 DIABETES MELLITUS WITH HYPERGLYCEMIA, WITH LONG-TERM CURRENT USE OF INSULIN: Primary | ICD-10-CM

## 2023-03-16 DIAGNOSIS — Z79.4 TYPE 2 DIABETES MELLITUS WITH HYPERGLYCEMIA, WITH LONG-TERM CURRENT USE OF INSULIN: Primary | ICD-10-CM

## 2023-03-16 PROCEDURE — 95251 CONT GLUC MNTR ANALYSIS I&R: CPT | Performed by: NURSE PRACTITIONER

## 2023-03-16 PROCEDURE — 99213 OFFICE O/P EST LOW 20 MIN: CPT | Performed by: NURSE PRACTITIONER

## 2023-03-16 RX ORDER — BLOOD-GLUCOSE SENSOR
1 EACH MISCELLANEOUS
Qty: 6 EACH | Refills: 2 | Status: SHIPPED | OUTPATIENT
Start: 2023-03-16

## 2023-03-16 NOTE — PROGRESS NOTES
"Chief Complaint  Diabetes (Type 2: Pt gregg is attached, is up to date on eye exam, no hx of retinopathy or neuropathy.)    Subjective        Candi Del Rio presents to [facility] ENDOCRINOLOGY  History of Present Illness    I have reviewed PMH, allergies and medications UTD at this visit      Type 2 dm    Diagnosed in 2019  Today in clinic pt reports being on ozempic 0.5mg weekly, metformin 1000mg BID, glimepiride 2mg BID  Sensor - gregg 94% time in range 0%  Last eye exam - 1/2023  Dm nephropathy - denies       Objective   Vital Signs:  /90   Pulse 77   Temp 97.1 °F (36.2 °C) (Temporal)   Ht 157.5 cm (62.01\")   Wt 120 kg (264 lb 12.8 oz)   SpO2 100%   BMI 48.42 kg/m²   Estimated body mass index is 48.42 kg/m² as calculated from the following:    Height as of this encounter: 157.5 cm (62.01\").    Weight as of this encounter: 120 kg (264 lb 12.8 oz).             Physical Exam  Vitals reviewed.   Constitutional:       General: She is not in acute distress.  HENT:      Head: Normocephalic and atraumatic.   Cardiovascular:      Rate and Rhythm: Normal rate.   Pulmonary:      Effort: Pulmonary effort is normal. No respiratory distress.   Musculoskeletal:         General: No signs of injury. Normal range of motion.      Cervical back: Normal range of motion and neck supple.   Skin:     General: Skin is warm and dry.   Neurological:      Mental Status: She is alert and oriented to person, place, and time. Mental status is at baseline.   Psychiatric:         Mood and Affect: Mood normal.         Behavior: Behavior normal.         Thought Content: Thought content normal.         Judgment: Judgment normal.        Result Review :  The following data was reviewed by: WESLEY Arreola on 03/16/2023:  Common labs    Common Labs 6/2/22 6/2/22 11/16/22 11/16/22 11/16/22 1/24/23 1/24/23 1/24/23 1/24/23    1120 1120 1512 1512 1512 1041 1041 1041 1041   Glucose  311 (A)  209 (A)   150 (A)     BUN  10 "  9   9     Creatinine  0.82  0.82   0.81     Sodium  134 (A)  140   139     Potassium  4.2  4.2   4.1     Chloride  98  103   104     Calcium  9.3  9.4   9.0     Total Protein    6.7   6.7     Albumin    4.00   3.8     Total Bilirubin    <0.2   0.3     Alkaline Phosphatase    60   57     AST (SGOT)    12   9     ALT (SGPT)    13   10     WBC 4.57     4.69      Hemoglobin 11.6 (A)     11.7 (A)      Hematocrit 36.8     36.5      Platelets 318     294      Total Cholesterol     169   141    Triglycerides     175 (A)   147    HDL Cholesterol     52   48    LDL Cholesterol      87   68    Hemoglobin A1C   8.70 (A)      8.00 (A)   (A) Abnormal value       Comments are available for some flowsheets but are not being displayed.                        Assessment and Plan   Diagnoses and all orders for this visit:    1. Type 2 diabetes mellitus with hyperglycemia, with long-term current use of insulin (HCC) (Primary)  -     Basic Metabolic Panel  -     Fructosamine  -     Continuous Blood Gluc Sensor (FreeStyle Brooklynn 3 Sensor) misc; 1 each Every 14 (Fourteen) Days.  Dispense: 6 each; Refill: 2             Follow Up   Return in about 4 months (around 7/16/2023).     Cgm review with patient  Labs today  No additional changes made at this time  Cgm promising for well controlled glycemic statin     Patient was given instructions and counseling regarding her condition or for health maintenance advice. Please see specific information pulled into the AVS if appropriate.     Yesi Spicer APRN

## 2023-03-17 LAB
BUN SERPL-MCNC: 8 MG/DL (ref 6–20)
BUN/CREAT SERPL: 11.9 (ref 7–25)
CALCIUM SERPL-MCNC: 9.3 MG/DL (ref 8.6–10.5)
CHLORIDE SERPL-SCNC: 103 MMOL/L (ref 98–107)
CO2 SERPL-SCNC: 23.1 MMOL/L (ref 22–29)
CREAT SERPL-MCNC: 0.67 MG/DL (ref 0.57–1)
EGFRCR SERPLBLD CKD-EPI 2021: 108.6 ML/MIN/1.73
FRUCTOSAMINE SERPL-SCNC: 256 UMOL/L (ref 0–285)
GLUCOSE SERPL-MCNC: 169 MG/DL (ref 65–99)
POTASSIUM SERPL-SCNC: 4.2 MMOL/L (ref 3.5–5.2)
SODIUM SERPL-SCNC: 138 MMOL/L (ref 136–145)

## 2023-03-20 DIAGNOSIS — E11.65 TYPE 2 DIABETES MELLITUS WITH HYPERGLYCEMIA, UNSPECIFIED WHETHER LONG TERM INSULIN USE: ICD-10-CM

## 2023-03-20 RX ORDER — GLIMEPIRIDE 2 MG/1
TABLET ORAL
Qty: 180 TABLET | Refills: 2 | Status: SHIPPED | OUTPATIENT
Start: 2023-03-20

## 2023-05-18 RX ORDER — LANOLIN ALCOHOL/MO/W.PET/CERES
CREAM (GRAM) TOPICAL
Qty: 90 TABLET | Refills: 0 | Status: SHIPPED | OUTPATIENT
Start: 2023-05-18

## 2023-08-02 ENCOUNTER — OFFICE VISIT (OUTPATIENT)
Dept: INTERNAL MEDICINE | Facility: CLINIC | Age: 48
End: 2023-08-02
Payer: COMMERCIAL

## 2023-08-02 VITALS
OXYGEN SATURATION: 99 % | WEIGHT: 257 LBS | HEART RATE: 79 BPM | BODY MASS INDEX: 47.29 KG/M2 | DIASTOLIC BLOOD PRESSURE: 78 MMHG | HEIGHT: 62 IN | SYSTOLIC BLOOD PRESSURE: 108 MMHG

## 2023-08-02 DIAGNOSIS — E11.65 UNCONTROLLED TYPE 2 DIABETES MELLITUS WITH HYPERGLYCEMIA: Primary | ICD-10-CM

## 2023-08-02 DIAGNOSIS — E66.01 CLASS 3 SEVERE OBESITY WITH SERIOUS COMORBIDITY AND BODY MASS INDEX (BMI) OF 45.0 TO 49.9 IN ADULT, UNSPECIFIED OBESITY TYPE: ICD-10-CM

## 2023-08-02 PROBLEM — G47.33 OBSTRUCTIVE SLEEP APNEA, ADULT: Status: RESOLVED | Noted: 2020-10-06 | Resolved: 2023-08-02

## 2023-08-02 PROCEDURE — 99213 OFFICE O/P EST LOW 20 MIN: CPT | Performed by: NURSE PRACTITIONER

## 2023-08-02 RX ORDER — SEMAGLUTIDE 1.34 MG/ML
1 INJECTION, SOLUTION SUBCUTANEOUS WEEKLY
Qty: 9 ML | Refills: 0 | Status: SHIPPED | OUTPATIENT
Start: 2023-08-02

## 2023-08-03 DIAGNOSIS — I10 ESSENTIAL HYPERTENSION: ICD-10-CM

## 2023-08-03 DIAGNOSIS — E11.65 UNCONTROLLED TYPE 2 DIABETES MELLITUS WITH HYPERGLYCEMIA: Primary | ICD-10-CM

## 2023-08-03 LAB — MICROALBUMIN UR-MCNC: 11.1 UG/ML

## 2023-09-26 RX ORDER — PIOGLITAZONEHYDROCHLORIDE 15 MG/1
15 TABLET ORAL DAILY
Qty: 30 TABLET | Refills: 0 | OUTPATIENT
Start: 2023-09-26 | End: 2023-10-26

## 2023-09-26 NOTE — TELEPHONE ENCOUNTER
Rx Refill Note  Requested Prescriptions     Pending Prescriptions Disp Refills    pioglitazone (ACTOS) 15 MG tablet 30 tablet 0     Sig: Take 1 tablet by mouth Daily for 30 days.      Last office visit with prescribing clinician: 3/16/2023   Last telemedicine visit with prescribing clinician: Visit date not found   Next office visit with prescribing clinician: Visit date not found                         Would you like a call back once the refill request has been completed: [] Yes [] No    If the office needs to give you a call back, can they leave a voicemail: [] Yes [] No    Sandra Fritz MA  09/26/23, 15:32 EDT

## 2023-10-02 ENCOUNTER — TELEPHONE (OUTPATIENT)
Dept: ENDOCRINOLOGY | Age: 48
End: 2023-10-02
Payer: COMMERCIAL

## 2023-10-02 NOTE — TELEPHONE ENCOUNTER
10/2/2023 Called and left patient a detailed message to try and schedule a follow up appointment.     OR HUB MAY RELAY MESSAGE TO PATIENT.

## 2023-10-03 ENCOUNTER — OFFICE VISIT (OUTPATIENT)
Dept: INTERNAL MEDICINE | Facility: CLINIC | Age: 48
End: 2023-10-03
Payer: COMMERCIAL

## 2023-10-03 VITALS
HEART RATE: 100 BPM | SYSTOLIC BLOOD PRESSURE: 140 MMHG | BODY MASS INDEX: 47.39 KG/M2 | HEIGHT: 62 IN | OXYGEN SATURATION: 98 % | DIASTOLIC BLOOD PRESSURE: 90 MMHG | TEMPERATURE: 98.7 F | WEIGHT: 257.5 LBS

## 2023-10-03 DIAGNOSIS — J03.90 TONSILLITIS: Primary | ICD-10-CM

## 2023-10-03 DIAGNOSIS — N76.0 ACUTE VAGINITIS: ICD-10-CM

## 2023-10-03 DIAGNOSIS — R05.9 COUGH, UNSPECIFIED TYPE: ICD-10-CM

## 2023-10-03 DIAGNOSIS — J02.9 SORE THROAT: ICD-10-CM

## 2023-10-03 LAB
EXPIRATION DATE: NORMAL
EXPIRATION DATE: NORMAL
FLUAV AG UPPER RESP QL IA.RAPID: NOT DETECTED
FLUBV AG UPPER RESP QL IA.RAPID: NOT DETECTED
INTERNAL CONTROL: NORMAL
INTERNAL CONTROL: NORMAL
Lab: NORMAL
Lab: NORMAL
S PYO AG THROAT QL: NEGATIVE
SARS-COV-2 AG UPPER RESP QL IA.RAPID: NOT DETECTED

## 2023-10-03 PROCEDURE — 99213 OFFICE O/P EST LOW 20 MIN: CPT | Performed by: NURSE PRACTITIONER

## 2023-10-03 PROCEDURE — 87428 SARSCOV & INF VIR A&B AG IA: CPT | Performed by: NURSE PRACTITIONER

## 2023-10-03 PROCEDURE — 87880 STREP A ASSAY W/OPTIC: CPT | Performed by: NURSE PRACTITIONER

## 2023-10-03 RX ORDER — AMOXICILLIN 875 MG/1
875 TABLET, COATED ORAL 2 TIMES DAILY
Qty: 20 TABLET | Refills: 0 | Status: SHIPPED | OUTPATIENT
Start: 2023-10-03

## 2023-10-03 RX ORDER — FLUCONAZOLE 150 MG/1
150 TABLET ORAL ONCE
Qty: 1 TABLET | Refills: 0 | Status: SHIPPED | OUTPATIENT
Start: 2023-10-03 | End: 2023-10-03

## 2023-10-03 NOTE — PROGRESS NOTES
Subjective   Candi Del Rio is a 48 y.o. female. Patient is here today for   Chief Complaint   Patient presents with    Headache    Sore Throat    Facial Pain    Nasal Congestion   .    History of Present Illness   C/o nasal congestion x 5 days associated with sore throat, left ear pain. No pain in chest. Some cough.  Theraflu helps some at night  She is concerned about possible Impetigo. She has a lesion on forehead and the right side of face. 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    The following portions of the patient's history were reviewed and updated as appropriate: allergies, current medications, past family history, past medical history, past social history, past surgical history and problem list.    Review of Systems    Objective   Vitals:    10/03/23 1137   BP: 140/90   Pulse: 100   Temp: 98.7 °F (37.1 °C)   SpO2: 98%     Body mass index is 47.09 kg/m².  Physical Exam  Vitals and nursing note reviewed.   Constitutional:       Appearance: Normal appearance. She is well-developed.   HENT:      Right Ear: Ear canal normal. A middle ear effusion is present.      Left Ear: Ear canal normal. A middle ear effusion is present.      Mouth/Throat:     Cardiovascular:      Rate and Rhythm: Normal rate and regular rhythm.      Heart sounds: Normal heart sounds.   Pulmonary:      Effort: Pulmonary effort is normal.      Breath sounds: Normal breath sounds.   Lymphadenopathy:      Head:      Left side of head: Tonsillar adenopathy present.   Skin:     General: Skin is warm and dry.   Neurological:      Mental Status: She is alert and oriented to person, place, and time.   Psychiatric:         Speech: Speech normal.         Behavior: Behavior normal.         Thought Content: Thought content normal.     Strep, covid, and flu are all negative    Assessment & Plan   Diagnoses and all orders for this visit:    1. Tonsillitis (Primary)  -     amoxicillin (AMOXIL) 875 MG tablet; Take 1 tablet  by mouth 2 (Two) Times a Day.  Dispense: 20 tablet; Refill: 0    2. Cough, unspecified type  -     POCT SARS-CoV-2 Antigen YURIY    3. Acute vaginitis  -     fluconazole (Diflucan) 150 MG tablet; Take 1 tablet by mouth 1 (One) Time for 1 dose.  Dispense: 1 tablet; Refill: 0      Will treat patient with amoxicillin. Diflucan prescribed because patient states that she gets yeast infections with antibiotic usage.

## 2023-10-26 DIAGNOSIS — I10 ESSENTIAL HYPERTENSION: ICD-10-CM

## 2023-10-26 DIAGNOSIS — E11.65 UNCONTROLLED TYPE 2 DIABETES MELLITUS WITH HYPERGLYCEMIA: ICD-10-CM

## 2023-10-27 LAB
ALBUMIN SERPL-MCNC: 4.1 G/DL (ref 3.5–5.2)
ALBUMIN/GLOB SERPL: 1.6 G/DL
ALP SERPL-CCNC: 57 U/L (ref 39–117)
ALT SERPL-CCNC: 11 U/L (ref 1–33)
AST SERPL-CCNC: 12 U/L (ref 1–32)
BILIRUB SERPL-MCNC: 0.2 MG/DL (ref 0–1.2)
BUN SERPL-MCNC: 10 MG/DL (ref 6–20)
BUN/CREAT SERPL: 13.2 (ref 7–25)
CALCIUM SERPL-MCNC: 9.3 MG/DL (ref 8.6–10.5)
CHLORIDE SERPL-SCNC: 105 MMOL/L (ref 98–107)
CHOLEST SERPL-MCNC: 155 MG/DL (ref 0–200)
CHOLEST/HDLC SERPL: 3.04 {RATIO}
CO2 SERPL-SCNC: 25.7 MMOL/L (ref 22–29)
CREAT SERPL-MCNC: 0.76 MG/DL (ref 0.57–1)
EGFRCR SERPLBLD CKD-EPI 2021: 96.8 ML/MIN/1.73
GLOBULIN SER CALC-MCNC: 2.6 GM/DL
GLUCOSE SERPL-MCNC: 135 MG/DL (ref 65–99)
HBA1C MFR BLD: 7.6 % (ref 4.8–5.6)
HDLC SERPL-MCNC: 51 MG/DL (ref 40–60)
LDLC SERPL CALC-MCNC: 85 MG/DL (ref 0–100)
POTASSIUM SERPL-SCNC: 4.2 MMOL/L (ref 3.5–5.2)
PROT SERPL-MCNC: 6.7 G/DL (ref 6–8.5)
SODIUM SERPL-SCNC: 139 MMOL/L (ref 136–145)
TRIGL SERPL-MCNC: 101 MG/DL (ref 0–150)
VLDLC SERPL CALC-MCNC: 19 MG/DL (ref 5–40)

## 2023-10-28 ENCOUNTER — APPOINTMENT (OUTPATIENT)
Dept: CT IMAGING | Facility: HOSPITAL | Age: 48
End: 2023-10-28
Payer: COMMERCIAL

## 2023-10-28 ENCOUNTER — HOSPITAL ENCOUNTER (EMERGENCY)
Facility: HOSPITAL | Age: 48
Discharge: HOME | End: 2023-10-28
Attending: EMERGENCY MEDICINE
Payer: COMMERCIAL

## 2023-10-28 VITALS
BODY MASS INDEX: 49.13 KG/M2 | TEMPERATURE: 98 F | RESPIRATION RATE: 16 BRPM | OXYGEN SATURATION: 99 % | SYSTOLIC BLOOD PRESSURE: 136 MMHG | WEIGHT: 267 LBS | DIASTOLIC BLOOD PRESSURE: 88 MMHG | HEIGHT: 62 IN | HEART RATE: 72 BPM

## 2023-10-28 DIAGNOSIS — D50.9 IRON DEFICIENCY ANEMIA, UNSPECIFIED IRON DEFICIENCY ANEMIA TYPE: ICD-10-CM

## 2023-10-28 DIAGNOSIS — N20.0 KIDNEY STONE ON LEFT SIDE: ICD-10-CM

## 2023-10-28 DIAGNOSIS — R10.31 RIGHT LOWER QUADRANT ABDOMINAL PAIN: Primary | ICD-10-CM

## 2023-10-28 LAB
ALBUMIN SERPL-MCNC: 3.7 G/DL (ref 3.5–5.2)
ALBUMIN/GLOB SERPL: 1.2 G/DL
ALP SERPL-CCNC: 54 U/L (ref 39–117)
ALT SERPL W P-5'-P-CCNC: 8 U/L (ref 1–33)
ANION GAP SERPL CALCULATED.3IONS-SCNC: 12.4 MMOL/L (ref 5–15)
AST SERPL-CCNC: 12 U/L (ref 1–32)
B-HCG UR QL: NEGATIVE
BASOPHILS # BLD AUTO: 0.02 10*3/MM3 (ref 0–0.2)
BASOPHILS NFR BLD AUTO: 0.4 % (ref 0–1.5)
BILIRUB SERPL-MCNC: 0.3 MG/DL (ref 0–1.2)
BILIRUB UR QL STRIP: NEGATIVE
BUN SERPL-MCNC: 10 MG/DL (ref 6–20)
BUN/CREAT SERPL: 13.7 (ref 7–25)
CALCIUM SPEC-SCNC: 8.5 MG/DL (ref 8.6–10.5)
CHLORIDE SERPL-SCNC: 105 MMOL/L (ref 98–107)
CLARITY UR: CLEAR
CO2 SERPL-SCNC: 21.6 MMOL/L (ref 22–29)
COLOR UR: YELLOW
CREAT SERPL-MCNC: 0.73 MG/DL (ref 0.57–1)
DEPRECATED RDW RBC AUTO: 43.4 FL (ref 37–54)
EGFRCR SERPLBLD CKD-EPI 2021: 101.6 ML/MIN/1.73
EOSINOPHIL # BLD AUTO: 0.17 10*3/MM3 (ref 0–0.4)
EOSINOPHIL NFR BLD AUTO: 3.1 % (ref 0.3–6.2)
ERYTHROCYTE [DISTWIDTH] IN BLOOD BY AUTOMATED COUNT: 13.3 % (ref 12.3–15.4)
GLOBULIN UR ELPH-MCNC: 3 GM/DL
GLUCOSE SERPL-MCNC: 134 MG/DL (ref 65–99)
GLUCOSE UR STRIP-MCNC: NEGATIVE MG/DL
HCT VFR BLD AUTO: 32.8 % (ref 34–46.6)
HGB BLD-MCNC: 10.7 G/DL (ref 12–15.9)
HGB UR QL STRIP.AUTO: NEGATIVE
IMM GRANULOCYTES # BLD AUTO: 0.01 10*3/MM3 (ref 0–0.05)
IMM GRANULOCYTES NFR BLD AUTO: 0.2 % (ref 0–0.5)
KETONES UR QL STRIP: NEGATIVE
LEUKOCYTE ESTERASE UR QL STRIP.AUTO: NEGATIVE
LYMPHOCYTES # BLD AUTO: 2.13 10*3/MM3 (ref 0.7–3.1)
LYMPHOCYTES NFR BLD AUTO: 38.9 % (ref 19.6–45.3)
MCH RBC QN AUTO: 29.1 PG (ref 26.6–33)
MCHC RBC AUTO-ENTMCNC: 32.6 G/DL (ref 31.5–35.7)
MCV RBC AUTO: 89.1 FL (ref 79–97)
MONOCYTES # BLD AUTO: 0.46 10*3/MM3 (ref 0.1–0.9)
MONOCYTES NFR BLD AUTO: 8.4 % (ref 5–12)
NEUTROPHILS NFR BLD AUTO: 2.69 10*3/MM3 (ref 1.7–7)
NEUTROPHILS NFR BLD AUTO: 49 % (ref 42.7–76)
NITRITE UR QL STRIP: NEGATIVE
NRBC BLD AUTO-RTO: 0 /100 WBC (ref 0–0.2)
PH UR STRIP.AUTO: 5.5 [PH] (ref 4.5–8)
PLATELET # BLD AUTO: 282 10*3/MM3 (ref 140–450)
PMV BLD AUTO: 9.9 FL (ref 6–12)
POTASSIUM SERPL-SCNC: 4 MMOL/L (ref 3.5–5.2)
PROT SERPL-MCNC: 6.7 G/DL (ref 6–8.5)
PROT UR QL STRIP: NEGATIVE
RBC # BLD AUTO: 3.68 10*6/MM3 (ref 3.77–5.28)
SODIUM SERPL-SCNC: 139 MMOL/L (ref 136–145)
SP GR UR STRIP: 1.02 (ref 1–1.03)
UROBILINOGEN UR QL STRIP: NORMAL
WBC NRBC COR # BLD: 5.48 10*3/MM3 (ref 3.4–10.8)

## 2023-10-28 PROCEDURE — 81003 URINALYSIS AUTO W/O SCOPE: CPT | Performed by: EMERGENCY MEDICINE

## 2023-10-28 PROCEDURE — 25510000001 IOPAMIDOL PER 1 ML: Performed by: EMERGENCY MEDICINE

## 2023-10-28 PROCEDURE — 74177 CT ABD & PELVIS W/CONTRAST: CPT

## 2023-10-28 PROCEDURE — 99285 EMERGENCY DEPT VISIT HI MDM: CPT

## 2023-10-28 PROCEDURE — 80053 COMPREHEN METABOLIC PANEL: CPT | Performed by: EMERGENCY MEDICINE

## 2023-10-28 PROCEDURE — 85025 COMPLETE CBC W/AUTO DIFF WBC: CPT | Performed by: EMERGENCY MEDICINE

## 2023-10-28 PROCEDURE — 81025 URINE PREGNANCY TEST: CPT | Performed by: EMERGENCY MEDICINE

## 2023-10-28 RX ORDER — SODIUM CHLORIDE 0.9 % (FLUSH) 0.9 %
10 SYRINGE (ML) INJECTION AS NEEDED
Status: DISCONTINUED | OUTPATIENT
Start: 2023-10-28 | End: 2023-10-28 | Stop reason: HOSPADM

## 2023-10-28 RX ORDER — DICYCLOMINE HYDROCHLORIDE 10 MG/1
20 CAPSULE ORAL ONCE
Status: COMPLETED | OUTPATIENT
Start: 2023-10-28 | End: 2023-10-28

## 2023-10-28 RX ORDER — DICYCLOMINE HCL 20 MG
20 TABLET ORAL EVERY 6 HOURS PRN
Qty: 30 TABLET | Refills: 0 | Status: SHIPPED | OUTPATIENT
Start: 2023-10-28 | End: 2023-11-02

## 2023-10-28 RX ADMIN — DICYCLOMINE HYDROCHLORIDE 20 MG: 10 CAPSULE ORAL at 05:31

## 2023-10-28 RX ADMIN — IOPAMIDOL 100 ML: 755 INJECTION, SOLUTION INTRAVENOUS at 04:49

## 2023-10-28 NOTE — ED PROVIDER NOTES
Subjective   History of Present Illness  Candi Del Rio is a 48-year-old black female who presents secondary to right lower quadrant abdominal pain.  Onset of pain Yesterday.  The pain is sharp and stabbing.  It lasted only a few seconds and then resolves.  No fever or chills.  No nausea or vomiting.  No dysuria.  No diarrhea or constipation.  No vaginal bleeding or discharge.  Patient states that WebMD says she has not appendicitis.  Thus she elected to come to the ED for evaluation.    Patient states the symptoms are identical to her gallbladder pain.  Patient states she ended up in the hospital for 10 days after cholecystectomy.    History provided by:  Patient      Review of Systems   Constitutional: Negative.  Negative for fever.   HENT: Negative.  Negative for rhinorrhea.    Eyes: Negative.  Negative for redness.   Respiratory:  Negative for cough.    Cardiovascular:  Negative for chest pain.   Gastrointestinal:  Positive for abdominal pain.   Endocrine: Negative.    Genitourinary: Negative.  Negative for difficulty urinating.   Musculoskeletal: Negative.  Negative for back pain.   Skin: Negative.  Negative for color change.   Neurological: Negative.  Negative for syncope.   Hematological: Negative.    Psychiatric/Behavioral: Negative.     All other systems reviewed and are negative.      Past Medical History:   Diagnosis Date    1st degree AV block     Abnormal uterine bleeding     Anxiety     Colon polyps     Fibroid     Glaucoma     History of transfusion     as     Iron deficiency anemia     Morbidly obese     APRIL (obstructive sleep apnea)     had normal sleep study per pt    Paroxysmal A-fib     dx after syncopal episode 2021, saw cardiology and had normal testing    Slow to wake up after anesthesia     after first  and lap ann    Syncope and collapse 2021    saw neuro, questionable seizure, had work up    Type 2 diabetes mellitus with hyperglycemia 2019    Vitamin B12 deficiency      Vitamin D deficiency        No Known Allergies    Past Surgical History:   Procedure Laterality Date     SECTION      x2    CHOLECYSTECTOMY      COLONOSCOPY N/A 2016    Procedure: COLONOSCOPY w/ polypectomy;  Surgeon: Darlene Vargas MD;  Location: [facility] OR;  Service:     COLONOSCOPY W/ POLYPECTOMY N/A 2022    Procedure: COLONOSCOPY WITH POLYPECTOMY;  Surgeon: Joe Mckeon MD;  Location: [facility] OR;  Service: Gastroenterology;  Laterality: N/A;  ascending colon polyp (cold snare)    ENDOMETRIAL ABLATION      ENDOSCOPY N/A 2022    Procedure: ESOPHAGOGASTRODUODENOSCOPY;  Surgeon: Joe Mckeon MD;  Location: [facility] OR;  Service: Gastroenterology;  Laterality: N/A;  normal exam    TUBAL ABDOMINAL LIGATION         Family History   Problem Relation Age of Onset    Irritable bowel syndrome Mother     Diabetes Mother     Hypertension Mother     Colon cancer Father 70    Diabetes Father     Hypertension Father     Colon polyps Father     Diabetes Sister     Diabetes Brother     Prostate cancer Maternal Grandfather     Seizures Niece     Seizures Other     Breast cancer Neg Hx     Ovarian cancer Neg Hx     Uterine cancer Neg Hx     Deep vein thrombosis Neg Hx     Pulmonary embolism Neg Hx     Crohn's disease Neg Hx     Ulcerative colitis Neg Hx     Malig Hyperthermia Neg Hx        Social History     Socioeconomic History    Marital status:      Spouse name: WILL    Number of children: 2   Tobacco Use    Smoking status: Never    Smokeless tobacco: Never   Vaping Use    Vaping Use: Never used   Substance and Sexual Activity    Alcohol use: No    Drug use: No    Sexual activity: Yes     Partners: Male     Birth control/protection: Surgical     Comment: BTL           Objective   Physical Exam  Vitals and nursing note reviewed.   Constitutional:       General: She is not in acute distress.     Appearance: Normal appearance. She is well-developed. She is not ill-appearing,  toxic-appearing or diaphoretic.      Comments: 48-year-old black female lying in bed.  Patient is obese.  She otherwise appears in good health.  Vital signs unremarkable.  Patient friendly and cooperative.   HENT:      Head: Normocephalic and atraumatic.      Right Ear: Tympanic membrane, ear canal and external ear normal.      Left Ear: Tympanic membrane, ear canal and external ear normal.      Nose: Nose normal.      Mouth/Throat:      Mouth: Mucous membranes are moist.      Pharynx: Oropharynx is clear.   Eyes:      Extraocular Movements: Extraocular movements intact.      Conjunctiva/sclera: Conjunctivae normal.      Pupils: Pupils are equal, round, and reactive to light.   Cardiovascular:      Rate and Rhythm: Normal rate and regular rhythm.      Pulses: Normal pulses.      Heart sounds: Normal heart sounds. No murmur heard.     No friction rub. No gallop.   Pulmonary:      Effort: Pulmonary effort is normal.      Breath sounds: Normal breath sounds.   Abdominal:      General: Bowel sounds are normal. There is no distension.      Palpations: Abdomen is soft. There is no mass.      Tenderness: There is no abdominal tenderness. There is no guarding or rebound.      Hernia: No hernia is present.   Musculoskeletal:         General: Normal range of motion.      Cervical back: Normal range of motion and neck supple.   Skin:     General: Skin is warm and dry.      Capillary Refill: Capillary refill takes less than 2 seconds.   Neurological:      General: No focal deficit present.      Mental Status: She is alert and oriented to person, place, and time.      Deep Tendon Reflexes: Reflexes are normal and symmetric.   Psychiatric:         Mood and Affect: Mood normal.         Behavior: Behavior normal.         Procedures           ED Course  ED Course as of 10/28/23 0528   Sat Oct 28, 2023   0338 Abdominal exam is unremarkable.  However patient continues to have intermittent right lower quadrant pain.  Obtaining labs and  CT with IV contrast.  Patient declined offer for pain medication at this time. [SS]   0407 Hemoglobin(!): 10.7 [SS]   0407 Hematocrit(!): 32.8 [SS]   0420 Glucose(!): 134 [SS]   0421 CBC shows anemia with hemoglobin 10.7 and hematocrit 32.8.  CMP notable for glucose of 134.  Otherwise unremarkable.  UA is unremarkable. [SS]   0504 CT does not show any acute abnormalities.  Appendix is unremarkable.  Uterine fibroids seen as well as stone in the left kidney.  No specific etiology found for patient's pain. [SS]   0525 I suspect patient's pain is GI in nature.  Giving Bentyl 20 mg p.o.  Will prescribe the same for home.  Discussed at length with patient all results, diagnoses, treatment and follow-up.  Also discussed need to take a multivitamin with iron.  Also discussed kidney stone diet plan.  All questions answered.  Will DC home.    Prescription  1-Bentyl [SS]      ED Course User Index  [SS] Grant Cleary MD      Labs Reviewed   COMPREHENSIVE METABOLIC PANEL - Abnormal; Notable for the following components:       Result Value    Glucose 134 (*)     CO2 21.6 (*)     Calcium 8.5 (*)     All other components within normal limits    Narrative:     GFR Normal >60  Chronic Kidney Disease <60  Kidney Failure <15     CBC WITH AUTO DIFFERENTIAL - Abnormal; Notable for the following components:    RBC 3.68 (*)     Hemoglobin 10.7 (*)     Hematocrit 32.8 (*)     All other components within normal limits   URINALYSIS W/ MICROSCOPIC IF INDICATED (NO CULTURE) - Normal    Narrative:     Urine microscopic not indicated.   PREGNANCY, URINE - Normal   CBC AND DIFFERENTIAL    Narrative:     The following orders were created for panel order CBC & Differential.  Procedure                               Abnormality         Status                     ---------                               -----------         ------                     CBC Auto Differential[[id number]]        Abnormal            Final result                 Please view  results for these tests on the individual orders.     CT Abdomen Pelvis With Contrast    Result Date: 10/28/2023  Narrative: CT ABDOMEN PELVIS W CONTRAST-10/28/2023 5:36 AM  INDICATION: Right lower quadrant abdominal pain beginning yesterday.  TECHNIQUE: CT of the abdomen and pelvis with IV contrast. Coronal and sagittal reconstructions were obtained.  Radiation dose reduction techniques included automated exposure control or exposure modulation based on body size. Count of known CT and cardiac nuc med studies performed in previous 12 months: 0.  COMPARISON: CT abdomen 4/29/2022 CT abdomen pelvis 8/5/2021  FINDINGS: Visualized lung bases are unremarkable.  Abdomen: Small benign-appearing hypoattenuating lesion in the right hepatic lobe is stable. The liver is otherwise unremarkable. The spleen and pancreas are normal. Cholecystectomy. Both adrenal glands are normal. Punctate nonobstructing left intrarenal stone. Tiny simple appearing left renal cyst requiring no additional follow-up. The kidneys are otherwise unremarkable. No hydronephrosis. Abdominal aorta normal in course and caliber. Small bowel is unremarkable without obstruction. Normal appendix. The colon is unremarkable. No free fluid or free air.  Pelvis: The urinary bladder is unremarkable. Multiple uterine leiomyomas again noted. 2.5 cm functional left ovarian cyst. Right adnexa is unremarkable. No free pelvic fluid.  No acute osseous abnormality.       Impression:  1. No acute abdominal or pelvic findings. 2. Normal appendix. 3. Punctate nonobstructing left intrarenal stone. 4. Fibroid uterus. 5. Cholecystectomy.      This report was finalized on 10/28/2023 4:59 AM by Dr. Jasper Rahman MD.       My differential diagnosis for abdominal pain includes but is not limited to:  Gastritis, gastroenteritis, peptic ulcer disease, GERD, esophageal perforation, acute appendicitis, mesenteric adenitis, Meckel’s diverticulum, epiploic appendagitis, diverticulitis,  colon cancer, ulcerative colitis, Crohn’s disease, intussusception, small bowel obstruction, adhesions, ischemic bowel, perforated viscus, ileus, obstipation, biliary colic, cholecystitis, cholelithiasis, John-Mann Abdoulaye, hepatitis, pancreatitis, common bile duct obstruction, cholangitis, bile leak, splenic trauma, splenic rupture, splenic infarction, splenic abscess, abdominal wall hematoma, abdominal wall abscess, intra-abdominal abscess, ascites, spontaneous bacterial peritonitis, hernia, UTI, cystitis, prostatitis, ureterolithiasis, urinary obstruction, AAA, myocardial infarction, pneumonia, cancer, porphyria, DKA, medications, sickle cell, viral syndrome, zoster                                       Medical Decision Making  Problems Addressed:  Iron deficiency anemia, unspecified iron deficiency anemia type: complicated acute illness or injury  Kidney stone on left side: complicated acute illness or injury  Right lower quadrant abdominal pain: complicated acute illness or injury    Amount and/or Complexity of Data Reviewed  Labs: ordered. Decision-making details documented in ED Course.  Radiology: ordered.    Risk  Prescription drug management.        Final diagnoses:   Right lower quadrant abdominal pain   Iron deficiency anemia, unspecified iron deficiency anemia type   Kidney stone on left side       ED Disposition  ED Disposition       ED Disposition   Discharge    Condition   Stable    Comment   --               Keya Baker, APRN  [address] [address]  [phone]    Schedule an appointment as soon as possible for a visit in 1 week  for continued or worsened symptoms, Sooner if needed         Medication List        New Prescriptions      dicyclomine 20 MG tablet  Commonly known as: BENTYL  Take 1 tablet by mouth Every 6 (Six) Hours As Needed (abdominal pain) for up to 30 doses.               Where to Get Your Medications        These medications were sent to [id number] - [place] - [address] AT [facility] & [address] - [phone] PH - [phone]   [address], [place] [address]      Phone: [phone]   dicyclomine 20 MG tablet            Grant Cleary MD  10/28/23 0544

## 2023-10-28 NOTE — DISCHARGE INSTRUCTIONS
Medication as directed.  Recommended daily multivitamin with iron.  Follow-up with your PCP as above.  Return to ED for worsening symptoms, medical emergencies.

## 2023-11-02 ENCOUNTER — OFFICE VISIT (OUTPATIENT)
Dept: INTERNAL MEDICINE | Facility: CLINIC | Age: 48
End: 2023-11-02
Payer: COMMERCIAL

## 2023-11-02 VITALS
OXYGEN SATURATION: 98 % | SYSTOLIC BLOOD PRESSURE: 128 MMHG | BODY MASS INDEX: 47.84 KG/M2 | HEART RATE: 87 BPM | DIASTOLIC BLOOD PRESSURE: 80 MMHG | HEIGHT: 62 IN | WEIGHT: 260 LBS

## 2023-11-02 DIAGNOSIS — N20.0 RENAL STONE: ICD-10-CM

## 2023-11-02 DIAGNOSIS — E11.65 UNCONTROLLED TYPE 2 DIABETES MELLITUS WITH HYPERGLYCEMIA: Primary | ICD-10-CM

## 2023-11-02 DIAGNOSIS — D64.9 ANEMIA, UNSPECIFIED TYPE: ICD-10-CM

## 2023-11-02 DIAGNOSIS — D25.9 UTERINE LEIOMYOMA, UNSPECIFIED LOCATION: ICD-10-CM

## 2023-11-02 DIAGNOSIS — N83.202 CYST OF LEFT OVARY: ICD-10-CM

## 2023-11-02 LAB
BASOPHILS # BLD AUTO: 0.02 10*3/MM3 (ref 0–0.2)
BASOPHILS NFR BLD AUTO: 0.4 % (ref 0–1.5)
EOSINOPHIL # BLD AUTO: 0.12 10*3/MM3 (ref 0–0.4)
EOSINOPHIL NFR BLD AUTO: 2.3 % (ref 0.3–6.2)
ERYTHROCYTE [DISTWIDTH] IN BLOOD BY AUTOMATED COUNT: 13.4 % (ref 12.3–15.4)
FERRITIN SERPL-MCNC: 24.3 NG/ML (ref 13–150)
HCT VFR BLD AUTO: 33.4 % (ref 34–46.6)
HGB BLD-MCNC: 11 G/DL (ref 12–15.9)
IMM GRANULOCYTES # BLD AUTO: 0.02 10*3/MM3 (ref 0–0.05)
IMM GRANULOCYTES NFR BLD AUTO: 0.4 % (ref 0–0.5)
IRON SATN MFR SERPL: 12 % (ref 20–50)
IRON SERPL-MCNC: 52 MCG/DL (ref 37–145)
LYMPHOCYTES # BLD AUTO: 1.67 10*3/MM3 (ref 0.7–3.1)
LYMPHOCYTES NFR BLD AUTO: 32.7 % (ref 19.6–45.3)
MCH RBC QN AUTO: 28.9 PG (ref 26.6–33)
MCHC RBC AUTO-ENTMCNC: 32.9 G/DL (ref 31.5–35.7)
MCV RBC AUTO: 87.7 FL (ref 79–97)
MONOCYTES # BLD AUTO: 0.37 10*3/MM3 (ref 0.1–0.9)
MONOCYTES NFR BLD AUTO: 7.2 % (ref 5–12)
NEUTROPHILS # BLD AUTO: 2.91 10*3/MM3 (ref 1.7–7)
NEUTROPHILS NFR BLD AUTO: 57 % (ref 42.7–76)
NRBC BLD AUTO-RTO: 0 /100 WBC (ref 0–0.2)
PLATELET # BLD AUTO: 326 10*3/MM3 (ref 140–450)
RBC # BLD AUTO: 3.81 10*6/MM3 (ref 3.77–5.28)
TIBC SERPL-MCNC: 428 MCG/DL
UIBC SERPL-MCNC: 376 MCG/DL (ref 112–346)
WBC # BLD AUTO: 5.11 10*3/MM3 (ref 3.4–10.8)

## 2023-11-02 PROCEDURE — 99214 OFFICE O/P EST MOD 30 MIN: CPT | Performed by: NURSE PRACTITIONER

## 2023-11-02 NOTE — PROGRESS NOTES
Subjective   Candi Del Rio is a 48 y.o. female.      History of Present Illness   The patient is here today to F/U on lab work. She is feeling ok.     DM2- wearing gregg, 85% time in range, average BG for 90 days 154.    10/28th- RLQ pain, went to [facility] ER. Ct abd pelbis shows- non obstructing left intrarenal stone, fibroid uterus. Left ovarian cyst 2.5 cm.     Mom in hospital- fell and broke her hip on Saturday. She is already on dialysis.   The following portions of the patient's history were reviewed and updated as appropriate: allergies, current medications, past family history, past medical history, past social history, past surgical history and problem list.    Review of Systems   Constitutional:  Negative for chills and fever.   Respiratory: Negative.     Cardiovascular: Negative.    Psychiatric/Behavioral:  Positive for stress. Negative for dysphoric mood and suicidal ideas. The patient is not nervous/anxious.        Objective   Physical Exam  Constitutional:       Appearance: Normal appearance. She is well-developed.   Neck:      Thyroid: No thyromegaly.   Cardiovascular:      Rate and Rhythm: Normal rate and regular rhythm.      Heart sounds: Normal heart sounds.   Pulmonary:      Effort: Pulmonary effort is normal.      Breath sounds: Normal breath sounds.   Musculoskeletal:      Cervical back: Normal range of motion and neck supple.   Lymphadenopathy:      Cervical: No cervical adenopathy.   Skin:     General: Skin is warm and dry.   Neurological:      Mental Status: She is alert.   Psychiatric:         Behavior: Behavior normal.         Thought Content: Thought content normal.         Judgment: Judgment normal.         Vitals:    11/02/23 0956   BP: 128/80   Pulse: 87   SpO2: 98%     Body mass index is 47.54 kg/m².      Current Outpatient Medications:     Continuous Blood Gluc Sensor (FreeStyle Gregg 3 Sensor) misc, 1 each Every 14 (Fourteen) Days., Disp: 6 each, Rfl: 2    glimepiride (Amaryl) 2 MG  tablet, By mouth take one tab twice daily with AM and PM meals., Disp: 180 tablet, Rfl: 2    glucose blood (True Metrix Blood Glucose Test) test strip, Check 1 time a day not on insulin tx, poor control, hypoglycemia. Dx: E 11.65, Disp: 100 each, Rfl: 4    metFORMIN ER (GLUCOPHAGE-XR) 500 MG 24 hr tablet, By mouth take 2 tabs twice daily after AM & PM meals., Disp: 360 tablet, Rfl: 2    Semaglutide, 1 MG/DOSE, (Ozempic, 1 MG/DOSE,) 4 MG/3ML solution pen-injector, Inject 1 mg under the skin into the appropriate area as directed 1 (One) Time Per Week., Disp: 9 mL, Rfl: 0    TRUEplus Lancets 30G misc, Check 1 time a day not on insulin tx, poor control, hypoglycemia. Dx: E 11.65, Disp: 100 each, Rfl: 4    vitamin B-12 (CYANOCOBALAMIN) 1000 MCG tablet, TAKE 1 TABLET BY MOUTH EVERY DAY FOR 90 DAYS, Disp: 90 tablet, Rfl: 0    vitamin D (ERGOCALCIFEROL) 1.25 MG (58161 UT) capsule capsule, Take 1 capsule by mouth 3 (Three) Times a Week., Disp: 36 capsule, Rfl: 2 la.last  Assessment & Plan   Diagnoses and all orders for this visit:    1. Uncontrolled type 2 diabetes mellitus with hyperglycemia (Primary)    2. Uterine leiomyoma, unspecified location    3. Cyst of left ovary    4. Renal stone  -     Ambulatory Referral to Urology    5. Anemia, unspecified type  -     CBC & Differential  -     Iron Profile  -     Ferritin        Admission on 10/28/2023, Discharged on 10/28/2023   Component Date Value Ref Range Status    Glucose 10/28/2023 134 (H)  65 - 99 mg/dL Final    BUN 10/28/2023 10  6 - 20 mg/dL Final    Creatinine 10/28/2023 0.73  0.57 - 1.00 mg/dL Final    Sodium 10/28/2023 139  136 - 145 mmol/L Final    Potassium 10/28/2023 4.0  3.5 - 5.2 mmol/L Final    Chloride 10/28/2023 105  98 - 107 mmol/L Final    CO2 10/28/2023 21.6 (L)  22.0 - 29.0 mmol/L Final    Calcium 10/28/2023 8.5 (L)  8.6 - 10.5 mg/dL Final    Total Protein 10/28/2023 6.7  6.0 - 8.5 g/dL Final    Albumin 10/28/2023 3.7  3.5 - 5.2 g/dL Final    ALT (SGPT)  10/28/2023 8  1 - 33 U/L Final    AST (SGOT) 10/28/2023 12  1 - 32 U/L Final    Alkaline Phosphatase 10/28/2023 54  39 - 117 U/L Final    Total Bilirubin 10/28/2023 0.3  0.0 - 1.2 mg/dL Final    Globulin 10/28/2023 3.0  gm/dL Final    A/G Ratio 10/28/2023 1.2  g/dL Final    BUN/Creatinine Ratio 10/28/2023 13.7  7.0 - 25.0 Final    Anion Gap 10/28/2023 12.4  5.0 - 15.0 mmol/L Final    eGFR 10/28/2023 101.6  >60.0 mL/min/1.73 Final    Color, UA 10/28/2023 Yellow  Yellow, Straw Final    Appearance, UA 10/28/2023 Clear  Clear Final    pH, UA 10/28/2023 5.5  4.5 - 8.0 Final    Specific Gravity, UA 10/28/2023 1.025  1.003 - 1.030 Final    Glucose, UA 10/28/2023 Negative  Negative Final    Ketones, UA 10/28/2023 Negative  Negative Final    Bilirubin, UA 10/28/2023 Negative  Negative Final    Blood, UA 10/28/2023 Negative  Negative Final    Protein, UA 10/28/2023 Negative  Negative Final    Leuk Esterase, UA 10/28/2023 Negative  Negative Final    Nitrite, UA 10/28/2023 Negative  Negative Final    Urobilinogen, UA 10/28/2023 0.2 E.U./dL  0.2 - 1.0 E.U./dL Final    WBC 10/28/2023 5.48  3.40 - 10.80 10*3/mm3 Final    RBC 10/28/2023 3.68 (L)  3.77 - 5.28 10*6/mm3 Final    Hemoglobin 10/28/2023 10.7 (L)  12.0 - 15.9 g/dL Final    Hematocrit 10/28/2023 32.8 (L)  34.0 - 46.6 % Final    MCV 10/28/2023 89.1  79.0 - 97.0 fL Final    MCH 10/28/2023 29.1  26.6 - 33.0 pg Final    MCHC 10/28/2023 32.6  31.5 - 35.7 g/dL Final    RDW 10/28/2023 13.3  12.3 - 15.4 % Final    RDW-SD 10/28/2023 43.4  37.0 - 54.0 fl Final    MPV 10/28/2023 9.9  6.0 - 12.0 fL Final    Platelets 10/28/2023 282  140 - 450 10*3/mm3 Final    Neutrophil % 10/28/2023 49.0  42.7 - 76.0 % Final    Lymphocyte % 10/28/2023 38.9  19.6 - 45.3 % Final    Monocyte % 10/28/2023 8.4  5.0 - 12.0 % Final    Eosinophil % 10/28/2023 3.1  0.3 - 6.2 % Final    Basophil % 10/28/2023 0.4  0.0 - 1.5 % Final    Immature Grans % 10/28/2023 0.2  0.0 - 0.5 % Final    Neutrophils, Absolute  10/28/2023 2.69  1.70 - 7.00 10*3/mm3 Final    Lymphocytes, Absolute 10/28/2023 2.13  0.70 - 3.10 10*3/mm3 Final    Monocytes, Absolute 10/28/2023 0.46  0.10 - 0.90 10*3/mm3 Final    Eosinophils, Absolute 10/28/2023 0.17  0.00 - 0.40 10*3/mm3 Final    Basophils, Absolute 10/28/2023 0.02  0.00 - 0.20 10*3/mm3 Final    Immature Grans, Absolute 10/28/2023 0.01  0.00 - 0.05 10*3/mm3 Final    nRBC 10/28/2023 0.0  0.0 - 0.2 /100 WBC Final    HCG, Urine QL 10/28/2023 Negative  Negative Final           1. DM2- close to goal, continue same, she does have some abd pain so will continue the ozempic 1 mg weekly, would consider switching to mounjaro   Continue low carb diet and exercise   2. Uterine fibroids- GYN aware, notify them ovarian cyst   3. Renal stone- place urology referral   4. Anemia- while in ER, recheck CBC and iron studies, she does have heavy periods, she will discuss hyst with GYN     Defers flu vaccine

## 2023-11-02 NOTE — PATIENT INSTRUCTIONS
1. DM2- close to goal, continue same, she does have some abd pain so will continue the ozempic 1 mg weekly, would consider switching to mounjaro   Continue low carb diet and exercise   2. Uterine fibroids- GYN aware, notify them ovarian cyst   3. Renal stone- place urology referral   4. Anemia- while in ER, recheck CBC and iron studies, she does have heavy periods, she will discuss hyst with GYN

## 2023-11-03 DIAGNOSIS — D64.9 ANEMIA, UNSPECIFIED TYPE: Primary | ICD-10-CM

## 2023-11-13 DIAGNOSIS — E55.9 VITAMIN D DEFICIENCY: ICD-10-CM

## 2023-11-13 RX ORDER — ERGOCALCIFEROL 1.25 MG/1
50000 CAPSULE ORAL 3 TIMES WEEKLY
Qty: 36 CAPSULE | Refills: 2 | Status: SHIPPED | OUTPATIENT
Start: 2023-11-13

## 2023-11-20 DIAGNOSIS — E11.65 TYPE 2 DIABETES MELLITUS WITH HYPERGLYCEMIA, UNSPECIFIED WHETHER LONG TERM INSULIN USE: ICD-10-CM

## 2023-11-20 RX ORDER — METFORMIN HYDROCHLORIDE 500 MG/1
TABLET, EXTENDED RELEASE ORAL
Qty: 360 TABLET | Refills: 1 | OUTPATIENT
Start: 2023-11-20

## 2023-11-20 RX ORDER — METFORMIN HYDROCHLORIDE 500 MG/1
TABLET, EXTENDED RELEASE ORAL
Qty: 360 TABLET | Refills: 2 | OUTPATIENT
Start: 2023-11-20

## 2023-11-20 RX ORDER — METFORMIN HYDROCHLORIDE 500 MG/1
TABLET, EXTENDED RELEASE ORAL
Qty: 120 TABLET | Refills: 0 | Status: SHIPPED | OUTPATIENT
Start: 2023-11-20

## 2023-11-20 NOTE — TELEPHONE ENCOUNTER
Incoming Refill Request      Medication requested (name and dose): METFORMIN    Pharmacy where request should be sent: [facility]    Additional details provided by patient:     Best call back number: [phone]     Does the patient have less than a 3 day supply:  [] Yes  [] No    Roberto Carlos Mullins Rep  11/20/23, 12:48 EST

## 2023-11-27 ENCOUNTER — SPECIALTY PHARMACY (OUTPATIENT)
Dept: ENDOCRINOLOGY | Age: 48
End: 2023-11-27
Payer: COMMERCIAL

## 2023-11-27 NOTE — PROGRESS NOTES
Benefits Investigation Summary    Prescription: Renewal     Dispensing pharmacy:   Currently filling at   "[id number]  Price below listed for [employer]     Copay amount:  Ozempic $54/84 days    Prior Auth and Med Assistance notes: N/A    BIN: [id number]  PCN: [id number]    Heena Bryant, [place], MM   Clinical Speciality Pharmacist, Endocrinology   11/27/2023  09:20 EST

## 2023-11-29 ENCOUNTER — OFFICE VISIT (OUTPATIENT)
Dept: ENDOCRINOLOGY | Age: 48
End: 2023-11-29
Payer: COMMERCIAL

## 2023-11-29 VITALS
OXYGEN SATURATION: 100 % | HEIGHT: 62 IN | SYSTOLIC BLOOD PRESSURE: 136 MMHG | HEART RATE: 83 BPM | DIASTOLIC BLOOD PRESSURE: 84 MMHG | BODY MASS INDEX: 48.07 KG/M2 | WEIGHT: 261.2 LBS | TEMPERATURE: 97.1 F

## 2023-11-29 DIAGNOSIS — E66.01 CLASS 3 SEVERE OBESITY DUE TO EXCESS CALORIES WITH SERIOUS COMORBIDITY AND BODY MASS INDEX (BMI) OF 45.0 TO 49.9 IN ADULT: ICD-10-CM

## 2023-11-29 DIAGNOSIS — E11.65 TYPE 2 DIABETES MELLITUS WITH HYPERGLYCEMIA, WITHOUT LONG-TERM CURRENT USE OF INSULIN: Primary | ICD-10-CM

## 2023-11-29 PROCEDURE — 99214 OFFICE O/P EST MOD 30 MIN: CPT | Performed by: NURSE PRACTITIONER

## 2023-11-29 NOTE — PROGRESS NOTES
"Chief Complaint  Diabetes (Type 2: Pt gregg is attached, is up to date on eye exam, no hx of retinopathy or neuropathy. )    Subjective        Candi Del Rio presents to [facility]  History of Present Illness    Type 2 dm    Diagnosed in 2019  Today in clinic pt reports being on ozempic 1mg weekly, metformin 1000mg BID, and glimepiride 2mg BID  She has been wanting to consider changing to mounjaro to see if her nausea on day 1 and 2 of ozempic can be improved  Reviewed nausea prevention techniques to trial before changing to Mounjaro since she just got a new 90 days supply that she does not want to waste   Last eye exam - 1/2023  No known diabetic complications     Objective   Vital Signs:  /84   Pulse 83   Temp 97.1 °F (36.2 °C) (Temporal)   Ht 157.5 cm (62.01\")   Wt 118 kg (261 lb 3.2 oz)   SpO2 100%   BMI 47.76 kg/m²   Estimated body mass index is 47.76 kg/m² as calculated from the following:    Height as of this encounter: 157.5 cm (62.01\").    Weight as of this encounter: 118 kg (261 lb 3.2 oz).           Physical Exam  Vitals reviewed.   Constitutional:       General: She is not in acute distress.  HENT:      Head: Normocephalic and atraumatic.   Cardiovascular:      Rate and Rhythm: Normal rate.   Pulmonary:      Effort: Pulmonary effort is normal. No respiratory distress.   Musculoskeletal:         General: No signs of injury. Normal range of motion.      Cervical back: Normal range of motion and neck supple.   Skin:     General: Skin is warm and dry.   Neurological:      Mental Status: She is alert and oriented to person, place, and time. Mental status is at baseline.   Psychiatric:         Mood and Affect: Mood normal.         Behavior: Behavior normal.         Thought Content: Thought content normal.         Judgment: Judgment normal.          Result Review :  The following data was reviewed by: WESLEY Arreola on 11/29/2023:  Common labs          " 10/26/2023    09:14 10/28/2023    03:56 11/2/2023    10:50   Common Labs   Glucose 135  134     BUN 10  10     Creatinine 0.76  0.73     Sodium 139  139     Potassium 4.2  4.0     Chloride 105  105     Calcium 9.3  8.5     Total Protein 6.7      Albumin 4.1  3.7     Total Bilirubin 0.2  0.3     Alkaline Phosphatase 57  54     AST (SGOT) 12  12     ALT (SGPT) 11  8     WBC  5.48  5.11    Hemoglobin  10.7  11.0    Hematocrit  32.8  33.4    Platelets  282  326    Total Cholesterol 155      Triglycerides 101      HDL Cholesterol 51      LDL Cholesterol  85      Hemoglobin A1C 7.60                     Assessment and Plan   Diagnoses and all orders for this visit:    1. Type 2 diabetes mellitus with hyperglycemia, without long-term current use of insulin (Primary)    2. Class 3 severe obesity due to excess calories with serious comorbidity and body mass index (BMI) of 45.0 to 49.9 in adult             Follow Up   Return in about 3 months (around 2/29/2024).    A1c improving, will hold on changes unless patient wants to change to mounjaro  LDL at goal, not on statin    Continue with weight loss efforts     Patient was given instructions and counseling regarding her condition or for health maintenance advice. Please see specific information pulled into the AVS if appropriate.     WESLEY Arreola

## 2023-12-27 DIAGNOSIS — E11.65 TYPE 2 DIABETES MELLITUS WITH HYPERGLYCEMIA, UNSPECIFIED WHETHER LONG TERM INSULIN USE: ICD-10-CM

## 2023-12-27 RX ORDER — GLIMEPIRIDE 2 MG/1
TABLET ORAL
Qty: 180 TABLET | Refills: 0 | Status: SHIPPED | OUTPATIENT
Start: 2023-12-27

## 2023-12-27 NOTE — TELEPHONE ENCOUNTER
Rx Refill Note  Requested Prescriptions     Pending Prescriptions Disp Refills    glimepiride (AMARYL) 2 MG tablet [Pharmacy Med Name: GLIMEPIRIDE 2MG TABLETS] 180 tablet 2     Sig: TAKE 1 TABLET BY MOUTH TWICE DAILY WITH THE MORNING AND EVENING MEAL      Last office visit with prescribing clinician: 11/29/2023   Last telemedicine visit with prescribing clinician: Visit date not found   Next office visit with prescribing clinician: 3/29/2024                         Would you like a call back once the refill request has been completed: [] Yes [] No    If the office needs to give you a call back, can they leave a voicemail: [] Yes [] No    Maricarmen Gallagher  12/27/23, 14:19 EST

## 2024-04-03 ENCOUNTER — OFFICE VISIT (OUTPATIENT)
Dept: ENDOCRINOLOGY | Age: 49
End: 2024-04-03
Payer: COMMERCIAL

## 2024-04-03 VITALS
DIASTOLIC BLOOD PRESSURE: 82 MMHG | HEIGHT: 62 IN | BODY MASS INDEX: 50.02 KG/M2 | SYSTOLIC BLOOD PRESSURE: 136 MMHG | OXYGEN SATURATION: 99 % | WEIGHT: 271.8 LBS | HEART RATE: 86 BPM

## 2024-04-03 DIAGNOSIS — E11.65 TYPE 2 DIABETES MELLITUS WITH HYPERGLYCEMIA, WITHOUT LONG-TERM CURRENT USE OF INSULIN: Primary | ICD-10-CM

## 2024-04-03 DIAGNOSIS — E66.01 CLASS 3 SEVERE OBESITY DUE TO EXCESS CALORIES WITH SERIOUS COMORBIDITY AND BODY MASS INDEX (BMI) OF 45.0 TO 49.9 IN ADULT: ICD-10-CM

## 2024-04-03 PROCEDURE — 95251 CONT GLUC MNTR ANALYSIS I&R: CPT | Performed by: NURSE PRACTITIONER

## 2024-04-03 PROCEDURE — 99214 OFFICE O/P EST MOD 30 MIN: CPT | Performed by: NURSE PRACTITIONER

## 2024-04-03 RX ORDER — GLIMEPIRIDE 2 MG/1
TABLET ORAL
Qty: 180 TABLET | Refills: 1 | Status: SHIPPED | OUTPATIENT
Start: 2024-04-03

## 2024-04-03 RX ORDER — METFORMIN HYDROCHLORIDE 500 MG/1
1000 TABLET, EXTENDED RELEASE ORAL 2 TIMES DAILY
Qty: 360 TABLET | Refills: 1 | Status: SHIPPED | OUTPATIENT
Start: 2024-04-03

## 2024-04-03 RX ORDER — BLOOD-GLUCOSE SENSOR
1 EACH MISCELLANEOUS
Qty: 6 EACH | Refills: 2 | Status: SHIPPED | OUTPATIENT
Start: 2024-04-03

## 2024-04-03 NOTE — PROGRESS NOTES
"Chief Complaint  Diabetes (Type 2. Brooklynn is attached)    Subjective        Candi Del Rio presents to [facility] ENDOCRINOLOGY  History of Present Illness    Ready to change to mounjaro   Off ozempic x 3 months r/t nausea and intolerance  Wanting to stop     Type 2 dm, 2019   Dm regimen: metformin 1000mg BID, and glimepiride 2mg BID  Last eye exam - 3/2024  Denies CP and MANNING   Denies diabetic foot concerns     Cgm review, 93% active time 3/21/24-4/3/24  Avg glu 189  Gmi 7.8%  Very high 8%  High 51%  Target range 41%  Low 0%    Objective   Vital Signs:  /82 (BP Location: Right arm, Patient Position: Sitting)   Pulse 86   Ht 157.5 cm (62.01\")   Wt 123 kg (271 lb 12.8 oz)   SpO2 99%   BMI 49.70 kg/m²   Estimated body mass index is 49.7 kg/m² as calculated from the following:    Height as of this encounter: 157.5 cm (62.01\").    Weight as of this encounter: 123 kg (271 lb 12.8 oz).           Physical Exam  Vitals reviewed.   Constitutional:       General: She is not in acute distress.  HENT:      Head: Normocephalic and atraumatic.   Cardiovascular:      Rate and Rhythm: Normal rate.   Pulmonary:      Effort: Pulmonary effort is normal. No respiratory distress.   Musculoskeletal:         General: No signs of injury. Normal range of motion.      Cervical back: Normal range of motion and neck supple.   Skin:     General: Skin is warm and dry.   Neurological:      Mental Status: She is alert and oriented to person, place, and time. Mental status is at baseline.   Psychiatric:         Mood and Affect: Mood normal.         Behavior: Behavior normal.         Thought Content: Thought content normal.         Judgment: Judgment normal.          Result Review :    The following data was reviewed by: WESLEY Arreola on 04/03/2024:  Common labs          10/26/2023    09:14 10/28/2023    03:56 11/2/2023    10:50   Common Labs   Glucose 135  134     BUN 10  10     Creatinine 0.76  0.73     Sodium 139 "  139     Potassium 4.2  4.0     Chloride 105  105     Calcium 9.3  8.5     Total Protein 6.7      Albumin 4.1  3.7     Total Bilirubin 0.2  0.3     Alkaline Phosphatase 57  54     AST (SGOT) 12  12     ALT (SGPT) 11  8     WBC  5.48  5.11    Hemoglobin  10.7  11.0    Hematocrit  32.8  33.4    Platelets  282  326    Total Cholesterol 155      Triglycerides 101      HDL Cholesterol 51      LDL Cholesterol  85      Hemoglobin A1C 7.60                     Assessment and Plan     Diagnoses and all orders for this visit:    1. Type 2 diabetes mellitus with hyperglycemia, without long-term current use of insulin (Primary)  -     Tirzepatide (Mounjaro) 2.5 MG/0.5ML solution pen-injector pen; Inject 0.5 mL under the skin into the appropriate area as directed 1 (One) Time Per Week. Start 2.5 mg subQ weekly for 4 weeks then go to 5 mg weekly  Dispense: 2 mL; Refill: 0  -     glimepiride (AMARYL) 2 MG tablet; TAKE 1 TABLET BY MOUTH TWICE DAILY WITH THE MORNING AND EVENING MEAL  Dispense: 180 tablet; Refill: 1  -     Continuous Blood Gluc Sensor (FreeStyle Brooklynn 3 Sensor) misc; Use 1 each Every 14 (Fourteen) Days.  Dispense: 6 each; Refill: 2  -     metFORMIN ER (GLUCOPHAGE-XR) 500 MG 24 hr tablet; Take 2 tablets by mouth 2 (Two) Times a Day. By mouth take 2 tabs twice daily after AM & PM meals.  Dispense: 360 tablet; Refill: 1  -     Hemoglobin A1c  -     Basic Metabolic Panel    2. Class 3 severe obesity due to excess calories with serious comorbidity and body mass index (BMI) of 45.0 to 49.9 in adult             Follow Up     Return in about 4 months (around 8/3/2024).    Cgm reviewed, not at goal  Labs today  Start mounjaro- reviewed medication safety, dosing and indications      Patient was given instructions and counseling regarding her condition or for health maintenance advice. Please see specific information pulled into the AVS if appropriate.       WESLEY Arreola

## 2024-04-09 ENCOUNTER — TELEPHONE (OUTPATIENT)
Dept: ENDOCRINOLOGY | Age: 49
End: 2024-04-09
Payer: COMMERCIAL

## 2024-04-09 ENCOUNTER — PRIOR AUTHORIZATION (OUTPATIENT)
Dept: ENDOCRINOLOGY | Age: 49
End: 2024-04-09
Payer: COMMERCIAL

## 2024-04-09 NOTE — TELEPHONE ENCOUNTER
Patient switched from the ozempic to mounjaro. She has a question regarding her mounjaro. She mentioned the insurance is needing to know she is diabetic so they can cover it

## 2024-04-09 NOTE — TELEPHONE ENCOUNTER
A PA has been started for Mounjaro 2.5MG/0.5ML pen-injectors.  Key: [id number] - PA Case ID: [id number]

## 2024-04-12 NOTE — TELEPHONE ENCOUNTER
Approved on April 11  Request Reference Number: [id number]. MOUNJARO INJ 2.5/0.5 is approved through 04/09/2025. Your patient may now fill this prescription and it will be covered.

## 2024-04-13 DIAGNOSIS — E11.65 TYPE 2 DIABETES MELLITUS WITH HYPERGLYCEMIA, WITHOUT LONG-TERM CURRENT USE OF INSULIN: ICD-10-CM

## 2024-04-15 RX ORDER — BLOOD-GLUCOSE SENSOR
EACH MISCELLANEOUS
Qty: 6 EACH | Refills: 1 | Status: SHIPPED | OUTPATIENT
Start: 2024-04-15

## 2024-04-15 NOTE — TELEPHONE ENCOUNTER
Rx Refill Note  Requested Prescriptions     Pending Prescriptions Disp Refills    Continuous Blood Gluc Sensor (FreeStyle Brooklynn 3 Sensor) misc [Pharmacy Med Name: FREESTYLE BROOKLYNN 3 SENSOR] 6 each 2     Sig: USE 1 ECH EVERY 14 DAYS      Last office visit with prescribing clinician: 4/3/2024   Last telemedicine visit with prescribing clinician: Visit date not found   Next office visit with prescribing clinician: 8/2/2024                         Would you like a call back once the refill request has been completed: [] Yes [] No    If the office needs to give you a call back, can they leave a voicemail: [] Yes [] No    Maricarmen Gallagher  04/15/24, 08:38 EDT

## 2024-05-06 ENCOUNTER — APPOINTMENT (OUTPATIENT)
Dept: CT IMAGING | Facility: HOSPITAL | Age: 49
End: 2024-05-06
Payer: COMMERCIAL

## 2024-05-06 ENCOUNTER — HOSPITAL ENCOUNTER (EMERGENCY)
Facility: HOSPITAL | Age: 49
Discharge: HOME | End: 2024-05-06
Attending: EMERGENCY MEDICINE
Payer: COMMERCIAL

## 2024-05-06 VITALS
WEIGHT: 260 LBS | SYSTOLIC BLOOD PRESSURE: 144 MMHG | HEIGHT: 62 IN | OXYGEN SATURATION: 100 % | HEART RATE: 59 BPM | TEMPERATURE: 98.6 F | DIASTOLIC BLOOD PRESSURE: 76 MMHG | RESPIRATION RATE: 16 BRPM | BODY MASS INDEX: 47.84 KG/M2

## 2024-05-06 DIAGNOSIS — S39.011A STRAIN OF ABDOMINAL WALL, INITIAL ENCOUNTER: ICD-10-CM

## 2024-05-06 DIAGNOSIS — S16.1XXA ACUTE STRAIN OF NECK MUSCLE, INITIAL ENCOUNTER: Primary | ICD-10-CM

## 2024-05-06 LAB
ALBUMIN SERPL-MCNC: 3.6 G/DL (ref 3.5–5.2)
ALBUMIN/GLOB SERPL: 1.2 G/DL
ALP SERPL-CCNC: 57 U/L (ref 39–117)
ALT SERPL W P-5'-P-CCNC: 8 U/L (ref 1–33)
ANION GAP SERPL CALCULATED.3IONS-SCNC: 10.1 MMOL/L (ref 5–15)
AST SERPL-CCNC: 13 U/L (ref 1–32)
BACTERIA UR QL AUTO: ABNORMAL /HPF
BASOPHILS # BLD AUTO: 0.02 10*3/MM3 (ref 0–0.2)
BASOPHILS NFR BLD AUTO: 0.5 % (ref 0–1.5)
BILIRUB SERPL-MCNC: 0.2 MG/DL (ref 0–1.2)
BILIRUB UR QL STRIP: NEGATIVE
BUN SERPL-MCNC: 8 MG/DL (ref 6–20)
BUN/CREAT SERPL: 12.3 (ref 7–25)
CALCIUM SPEC-SCNC: 8.4 MG/DL (ref 8.6–10.5)
CHLORIDE SERPL-SCNC: 105 MMOL/L (ref 98–107)
CLARITY UR: ABNORMAL
CO2 SERPL-SCNC: 22.9 MMOL/L (ref 22–29)
COLOR UR: ABNORMAL
CREAT SERPL-MCNC: 0.65 MG/DL (ref 0.57–1)
DEPRECATED RDW RBC AUTO: 46.7 FL (ref 37–54)
EGFRCR SERPLBLD CKD-EPI 2021: 108.8 ML/MIN/1.73
EOSINOPHIL # BLD AUTO: 0.12 10*3/MM3 (ref 0–0.4)
EOSINOPHIL NFR BLD AUTO: 2.9 % (ref 0.3–6.2)
ERYTHROCYTE [DISTWIDTH] IN BLOOD BY AUTOMATED COUNT: 14 % (ref 12.3–15.4)
GLOBULIN UR ELPH-MCNC: 2.9 GM/DL
GLUCOSE SERPL-MCNC: 176 MG/DL (ref 65–99)
GLUCOSE UR STRIP-MCNC: ABNORMAL MG/DL
HCT VFR BLD AUTO: 34.7 % (ref 34–46.6)
HGB BLD-MCNC: 10.8 G/DL (ref 12–15.9)
HGB UR QL STRIP.AUTO: ABNORMAL
HYALINE CASTS UR QL AUTO: ABNORMAL /LPF
IMM GRANULOCYTES # BLD AUTO: 0 10*3/MM3 (ref 0–0.05)
IMM GRANULOCYTES NFR BLD AUTO: 0 % (ref 0–0.5)
KETONES UR QL STRIP: NEGATIVE
LEUKOCYTE ESTERASE UR QL STRIP.AUTO: NEGATIVE
LIPASE SERPL-CCNC: 20 U/L (ref 13–60)
LYMPHOCYTES # BLD AUTO: 1.63 10*3/MM3 (ref 0.7–3.1)
LYMPHOCYTES NFR BLD AUTO: 39.4 % (ref 19.6–45.3)
MCH RBC QN AUTO: 27.9 PG (ref 26.6–33)
MCHC RBC AUTO-ENTMCNC: 31.1 G/DL (ref 31.5–35.7)
MCV RBC AUTO: 89.7 FL (ref 79–97)
MONOCYTES # BLD AUTO: 0.32 10*3/MM3 (ref 0.1–0.9)
MONOCYTES NFR BLD AUTO: 7.7 % (ref 5–12)
NEUTROPHILS NFR BLD AUTO: 2.05 10*3/MM3 (ref 1.7–7)
NEUTROPHILS NFR BLD AUTO: 49.5 % (ref 42.7–76)
NITRITE UR QL STRIP: NEGATIVE
PH UR STRIP.AUTO: 6 [PH] (ref 4.5–8)
PLATELET # BLD AUTO: 291 10*3/MM3 (ref 140–450)
PMV BLD AUTO: 10.2 FL (ref 6–12)
POTASSIUM SERPL-SCNC: 3.8 MMOL/L (ref 3.5–5.2)
PROT SERPL-MCNC: 6.5 G/DL (ref 6–8.5)
PROT UR QL STRIP: ABNORMAL
RBC # BLD AUTO: 3.87 10*6/MM3 (ref 3.77–5.28)
RBC # UR STRIP: ABNORMAL /HPF
REF LAB TEST METHOD: ABNORMAL
SODIUM SERPL-SCNC: 138 MMOL/L (ref 136–145)
SP GR UR STRIP: 1.02 (ref 1–1.03)
SQUAMOUS #/AREA URNS HPF: ABNORMAL /HPF
UROBILINOGEN UR QL STRIP: ABNORMAL
WBC # UR STRIP: ABNORMAL /HPF
WBC NRBC COR # BLD AUTO: 4.14 10*3/MM3 (ref 3.4–10.8)

## 2024-05-06 PROCEDURE — 70450 CT HEAD/BRAIN W/O DYE: CPT

## 2024-05-06 PROCEDURE — 81001 URINALYSIS AUTO W/SCOPE: CPT | Performed by: EMERGENCY MEDICINE

## 2024-05-06 PROCEDURE — 74177 CT ABD & PELVIS W/CONTRAST: CPT

## 2024-05-06 PROCEDURE — 83690 ASSAY OF LIPASE: CPT | Performed by: EMERGENCY MEDICINE

## 2024-05-06 PROCEDURE — 25510000001 IOPAMIDOL PER 1 ML: Performed by: EMERGENCY MEDICINE

## 2024-05-06 PROCEDURE — 80053 COMPREHEN METABOLIC PANEL: CPT | Performed by: EMERGENCY MEDICINE

## 2024-05-06 PROCEDURE — 99285 EMERGENCY DEPT VISIT HI MDM: CPT

## 2024-05-06 PROCEDURE — 72125 CT NECK SPINE W/O DYE: CPT

## 2024-05-06 PROCEDURE — 85025 COMPLETE CBC W/AUTO DIFF WBC: CPT | Performed by: EMERGENCY MEDICINE

## 2024-05-06 RX ORDER — CYCLOBENZAPRINE HCL 10 MG
10 TABLET ORAL 3 TIMES DAILY PRN
Qty: 24 TABLET | Refills: 0 | Status: SHIPPED | OUTPATIENT
Start: 2024-05-06

## 2024-05-06 RX ORDER — SODIUM CHLORIDE 0.9 % (FLUSH) 0.9 %
10 SYRINGE (ML) INJECTION AS NEEDED
Status: DISCONTINUED | OUTPATIENT
Start: 2024-05-06 | End: 2024-05-06 | Stop reason: HOSPADM

## 2024-05-06 RX ORDER — DICLOFENAC SODIUM 75 MG/1
75 TABLET, DELAYED RELEASE ORAL 2 TIMES DAILY PRN
Qty: 20 TABLET | Refills: 0 | Status: SHIPPED | OUTPATIENT
Start: 2024-05-06

## 2024-05-06 RX ADMIN — IOPAMIDOL 100 ML: 755 INJECTION, SOLUTION INTRAVENOUS at 09:33

## 2024-05-06 NOTE — Clinical Note
[place]  [facility] EMERGENCY DEPARTMENT  [address]  [place] [address]  Phone: [phone]    Candi Del Rio was seen and treated in our emergency department on 5/6/2024.  She may return to work on 05/10/2024.         Thank you for choosing [facility].    Turner Cox MD

## 2024-05-06 NOTE — ED PROVIDER NOTES
"Subjective     History provided by:  Patient    History of Present Illness    Chief complaint: Fall    Location: Injury to the back of the head, the neck and the left upper quadrant of the abdomen.    Quality/Severity: Moderate pain.    Timing/Onset: She fell Saturday night, 36 hours ago.    Modifying Factors: Movement of her neck exacerbates pain.  Her left upper quadrant of her abdomen is tender and hurts with movement.    Associated symptoms: Denies loss of consciousness.  Denies injuries to the extremities.  Denies any chest pain.  Denies any thoracic or lumbar back pain.    Narrative: The patient is a 48-year-old -American female who was walking her dog Saturday night when the dog chased the cat and she fell backwards landing on her back and striking her head on the concrete.  She denies any loss of conscious.  She states she has had neck pain since the fall that is exacerbated by turning her head.  She states the following day she noticed she had left upper quadrant abdominal pain that hurts with movement and is tender.    Review of Systems  Past Medical History:   Diagnosis Date    1st degree AV block     Abnormal uterine bleeding     Anxiety     Colon polyps     Fibroid     Glaucoma     History of transfusion     as     Iron deficiency anemia     Morbidly obese     APRIL (obstructive sleep apnea)     had normal sleep study per pt    Paroxysmal A-fib     dx after syncopal episode 2021, saw cardiology and had normal testing    Slow to wake up after anesthesia     after first  and lap ann    Syncope and collapse 2021    saw neuro, questionable seizure, had work up    Type 2 diabetes mellitus with hyperglycemia 2019    Vitamin B12 deficiency     Vitamin D deficiency      /76   Pulse 59   Temp 98.6 °F (37 °C) (Oral)   Resp 16   Ht 157.5 cm (62\")   Wt 118 kg (260 lb)   LMP 2024 (Approximate)   SpO2 100%   BMI 47.55 kg/m²     Past Medical History:   Diagnosis Date    " 1st degree AV block     Abnormal uterine bleeding     Anxiety     Colon polyps     Fibroid     Glaucoma     History of transfusion     as     Iron deficiency anemia     Morbidly obese     APRIL (obstructive sleep apnea)     had normal sleep study per pt    Paroxysmal A-fib     dx after syncopal episode 2021, saw cardiology and had normal testing    Slow to wake up after anesthesia     after first  and lap ann    Syncope and collapse 2021    saw neuro, questionable seizure, had work up    Type 2 diabetes mellitus with hyperglycemia     Vitamin B12 deficiency     Vitamin D deficiency        No Known Allergies    Past Surgical History:   Procedure Laterality Date     SECTION      x2    CHOLECYSTECTOMY      COLONOSCOPY N/A 2016    Procedure: COLONOSCOPY w/ polypectomy;  Surgeon: Darlene Vargas MD;  Location: [facility] OR;  Service:     COLONOSCOPY W/ POLYPECTOMY N/A 2022    Procedure: COLONOSCOPY WITH POLYPECTOMY;  Surgeon: Joe Mckeon MD;  Location: [facility] OR;  Service: Gastroenterology;  Laterality: N/A;  ascending colon polyp (cold snare)    ENDOMETRIAL ABLATION      ENDOSCOPY N/A 2022    Procedure: ESOPHAGOGASTRODUODENOSCOPY;  Surgeon: Joe Mckeon MD;  Location: [facility] OR;  Service: Gastroenterology;  Laterality: N/A;  normal exam    TUBAL ABDOMINAL LIGATION         Family History   Problem Relation Age of Onset    Irritable bowel syndrome Mother     Diabetes Mother     Hypertension Mother     Colon cancer Father 70    Diabetes Father     Hypertension Father     Colon polyps Father     Diabetes Sister     Diabetes Brother     Prostate cancer Maternal Grandfather     Seizures Niece     Seizures Other     Breast cancer Neg Hx     Ovarian cancer Neg Hx     Uterine cancer Neg Hx     Deep vein thrombosis Neg Hx     Pulmonary embolism Neg Hx     Crohn's disease Neg Hx     Ulcerative colitis Neg Hx     Malig Hyperthermia Neg Hx        Social History      Socioeconomic History    Marital status:      Spouse name: WILL    Number of children: 2   Tobacco Use    Smoking status: Never    Smokeless tobacco: Never   Vaping Use    Vaping status: Never Used   Substance and Sexual Activity    Alcohol use: No    Drug use: No    Sexual activity: Yes     Partners: Male     Birth control/protection: Surgical     Comment: BTL           Objective   Physical Exam  Vitals and nursing note reviewed.   Constitutional:       General: She is not in acute distress.     Appearance: Normal appearance. She is well-developed. She is obese. She is not ill-appearing, toxic-appearing or diaphoretic.      Comments: The patient appears healthy in no acute distress.  Review of her vital signs: Her blood pressure is elevated 153/133, remainder vital signs within normal limits.   HENT:      Head: Normocephalic and atraumatic.      Nose: Nose normal.      Mouth/Throat:      Mouth: Mucous membranes are moist.      Pharynx: Oropharynx is clear.   Eyes:      General: No scleral icterus.        Right eye: No discharge.         Left eye: No discharge.      Conjunctiva/sclera: Conjunctivae normal.      Pupils: Pupils are equal, round, and reactive to light.   Neck:      Thyroid: No thyromegaly.      Vascular: No JVD.   Cardiovascular:      Rate and Rhythm: Normal rate and regular rhythm.      Heart sounds: Normal heart sounds. No murmur heard.  Pulmonary:      Effort: Pulmonary effort is normal.      Breath sounds: Normal breath sounds. No wheezing, rhonchi or rales.   Chest:      Chest wall: No tenderness.   Abdominal:      General: Bowel sounds are normal. There is no distension.      Palpations: Abdomen is soft.      Tenderness: There is abdominal tenderness (Left upper quadrant.). There is no right CVA tenderness, left CVA tenderness, guarding or rebound.   Musculoskeletal:         General: No tenderness or deformity. Normal range of motion.      Cervical back: Normal range of motion and neck  supple. No tenderness.   Lymphadenopathy:      Cervical: No cervical adenopathy.   Skin:     General: Skin is warm and dry.      Capillary Refill: Capillary refill takes less than 2 seconds.      Findings: No bruising or rash.   Neurological:      Mental Status: She is alert and oriented to person, place, and time.      Cranial Nerves: No cranial nerve deficit.      Coordination: Coordination normal.      Comments: No focal motor sensory deficit   Psychiatric:         Mood and Affect: Mood normal.         Behavior: Behavior normal.         Thought Content: Thought content normal.         Judgment: Judgment normal.         Procedures           ED Course  ED Course as of 05/06/24 1055   Mon May 06, 2024   0904 Reviewed the patient's laboratory studies: The patient's CMP has an elevated glucose of 176, otherwise is within normal limits.  CBC is within normal limits.  Lipase is normal. [TP]   0957 CT of the head was interpreted by the radiologist as no acute intracranial abnormality.  CT of the cervical spine was negative for acute osseous abnormality.  CT of the abdomen pelvis with IV contrast was negative for acute abnormality in the abdomen and pelvis. [TP]   1005 The patient's urine was grossly bloody because the patient is currently on her menstrual period.  Urinalysis just shows blood, but is negative for infection. [TP]      ED Course User Index  [TP] Turner Cox MD                                     [facility] reviewed by Turner Cox MD       Medical Decision Making  My differential diagnosis for abdominal pain includes but is not limited to:  Gastritis, gastroenteritis, peptic ulcer disease, GERD, esophageal perforation, acute appendicitis, mesenteric adenitis, Meckel's diverticulum, epiploic appendagitis, diverticulitis, colon cancer, ulcerative colitis, Crohn's disease, intussusception, small bowel obstruction, adhesions, ischemic bowel, perforated viscus, ileus, obstipation, biliary colic,  cholecystitis, cholelithiasis, John-Mann Abdoulaye, hepatitis, pancreatitis, common bile duct obstruction, cholangitis, bile leak, splenic trauma, splenic rupture, splenic infarction, splenic abscess, abdominal abscess, ascites, spontaneous bacterial peritonitis, hernia, UTI, cystitis, prostatitis, ureterolithiasis, urinary obstruction, AAA, myocardial infarction, pneumonia, cancer, porphyria, DKA, medications, sickle cell, viral syndrome, zoster   My differential includes but is not limited to neck pain, cervical contusion, degenerative disc disease, herniated disc, acute cervical strain, cervical radiculopathy, cervical fracture, torticollis, carotid artery dissection and vertebral artery dissection     Problems Addressed:  Acute strain of neck muscle, initial encounter: complicated acute illness or injury  Strain of abdominal wall, initial encounter: complicated acute illness or injury    Amount and/or Complexity of Data Reviewed  Labs: ordered. Decision-making details documented in ED Course.  Radiology: ordered. Decision-making details documented in ED Course.    Risk  Prescription drug management.        Final diagnoses:   Acute strain of neck muscle, initial encounter   Strain of abdominal wall, initial encounter       ED Disposition  ED Disposition       ED Disposition   Discharge    Condition   Stable    Comment   --               Keya Baker, APRN  [address]  [place] [address]  [phone]    Schedule an appointment as soon as possible for a visit in 1 week  If not improved         Medication List        New Prescriptions      cyclobenzaprine 10 MG tablet  Commonly known as: FLEXERIL  Take 1 tablet by mouth 3 (Three) Times a Day As Needed for Muscle Spasms for up to 24 doses.     diclofenac 75 MG EC tablet  Commonly known as: VOLTAREN  Take 1 tablet by mouth 2 (Two) Times a Day As Needed (Pain) for up to 20 doses.               Where to Get Your Medications        These medications  were sent to [id number] - [place], [place] - [address] AT [facility] & [address] - [phone] PH - [phone] FX  [address], [place] [address]      Phone: [phone]   cyclobenzaprine 10 MG tablet  diclofenac 75 MG EC tablet           Labs Reviewed   COMPREHENSIVE METABOLIC PANEL - Abnormal; Notable for the following components:       Result Value    Glucose 176 (*)     Calcium 8.4 (*)     All other components within normal limits    Narrative:     GFR Normal >60  Chronic Kidney Disease <60  Kidney Failure <15     URINALYSIS W/ MICROSCOPIC IF INDICATED (NO CULTURE) - Abnormal; Notable for the following components:    Color, UA Red (*)     Appearance, UA Turbid (*)     Glucose,  mg/dL (Trace) (*)     Blood, UA Large (3+) (*)     Protein, UA >=300 mg/dL (3+) (*)     All other components within normal limits   CBC WITH AUTO DIFFERENTIAL - Abnormal; Notable for the following components:    Hemoglobin 10.8 (*)     MCHC 31.1 (*)     All other components within normal limits   URINALYSIS, MICROSCOPIC ONLY - Abnormal; Notable for the following components:    RBC, UA Too Numerous to Count (*)     WBC, UA 3-5 (*)     All other components within normal limits   LIPASE - Normal   CBC AND DIFFERENTIAL    Narrative:     The following orders were created for panel order CBC & Differential.  Procedure                               Abnormality         Status                     ---------                               -----------         ------                     CBC Auto Differential[[id number]]        Abnormal            Final result                 Please view results for these tests on the individual orders.     CT Head Without Contrast   Final Result   Impression:   No acute intracranial abnormality.         Electronically Signed: Hal Traylor MD     5/6/2024 9:50 AM EDT     Workstation ID: [id number]      CT Cervical Spine Without Contrast   Final Result   Impression:   No  acute abnormality of the cervical spine.         Electronically Signed: Hal Traylor MD     5/6/2024 9:52 AM EDT     Workstation ID: [id number]      CT Abdomen Pelvis With Contrast   Final Result   Impression:   No acute abnormality of the abdomen or pelvis. Ancillary findings as above.                  Electronically Signed: Hal Traylor MD     5/6/2024 9:56 AM EDT     Workstation ID: [id number]             Medication List        New Prescriptions      cyclobenzaprine 10 MG tablet  Commonly known as: FLEXERIL  Take 1 tablet by mouth 3 (Three) Times a Day As Needed for Muscle Spasms for up to 24 doses.     diclofenac 75 MG EC tablet  Commonly known as: VOLTAREN  Take 1 tablet by mouth 2 (Two) Times a Day As Needed (Pain) for up to 20 doses.               Where to Get Your Medications        These medications were sent to [id number] - [place] [facility] & [address] - [phone]  - [phone] [facility] [address] [facility] [place] [address]      Phone: [phone]   cyclobenzaprine 10 MG tablet  diclofenac 75 MG EC tablet              Turner Cox MD  05/06/24 7342

## 2024-05-23 DIAGNOSIS — E11.65 TYPE 2 DIABETES MELLITUS WITH HYPERGLYCEMIA, WITHOUT LONG-TERM CURRENT USE OF INSULIN: ICD-10-CM

## 2024-05-24 RX ORDER — METFORMIN HYDROCHLORIDE 500 MG/1
TABLET, EXTENDED RELEASE ORAL
Qty: 120 TABLET | Refills: 1 | Status: SHIPPED | OUTPATIENT
Start: 2024-05-24

## 2024-05-24 RX ORDER — TIRZEPATIDE 2.5 MG/.5ML
INJECTION, SOLUTION SUBCUTANEOUS
Qty: 2 ML | Refills: 1 | Status: SHIPPED | OUTPATIENT
Start: 2024-05-24

## 2024-05-24 NOTE — TELEPHONE ENCOUNTER
Rx Refill Note  Requested Prescriptions     Pending Prescriptions Disp Refills    Mounjaro 2.5 MG/0.5ML solution pen-injector pen [Pharmacy Med Name: MOUNJARO 2.5MG/0.5ML PF PEN INJ] 2 mL 0     Sig: ADMINISTER 2.5 MG UNDER THE SKIN 1 TIME EVERY WEEK AS DIRECTED. START 2.5 MG UNDER THE SKIN WEEKLY FOR 4 WEEKS THEN GOTO 5 MG WEEKLY    metFORMIN ER (GLUCOPHAGE-XR) 500 MG 24 hr tablet [Pharmacy Med Name: METFORMIN ER 500MG 24HR TABS] 120 tablet 0     Sig: BY MOUTH TAKE 2 TABS TWICE DAILY AFTER AM& PM MEALS.      Last office visit with prescribing clinician: 4/3/2024   Last telemedicine visit with prescribing clinician: Visit date not found   Next office visit with prescribing clinician: 8/2/2024                         Would you like a call back once the refill request has been completed: [] Yes [] No    If the office needs to give you a call back, can they leave a voicemail: [] Yes [] No    Maricarmen Gallagher  05/24/24, 07:37 EDT

## 2024-08-02 ENCOUNTER — OFFICE VISIT (OUTPATIENT)
Dept: ENDOCRINOLOGY | Age: 49
End: 2024-08-02
Payer: COMMERCIAL

## 2024-08-02 VITALS
SYSTOLIC BLOOD PRESSURE: 132 MMHG | TEMPERATURE: 98.5 F | DIASTOLIC BLOOD PRESSURE: 90 MMHG | WEIGHT: 269.4 LBS | HEIGHT: 62 IN | OXYGEN SATURATION: 98 % | BODY MASS INDEX: 49.58 KG/M2 | HEART RATE: 84 BPM

## 2024-08-02 DIAGNOSIS — E11.65 TYPE 2 DIABETES MELLITUS WITH HYPERGLYCEMIA, WITHOUT LONG-TERM CURRENT USE OF INSULIN: Primary | ICD-10-CM

## 2024-08-02 DIAGNOSIS — E66.01 CLASS 3 SEVERE OBESITY DUE TO EXCESS CALORIES WITH SERIOUS COMORBIDITY AND BODY MASS INDEX (BMI) OF 45.0 TO 49.9 IN ADULT: ICD-10-CM

## 2024-08-02 PROCEDURE — 95251 CONT GLUC MNTR ANALYSIS I&R: CPT | Performed by: NURSE PRACTITIONER

## 2024-08-02 PROCEDURE — 99214 OFFICE O/P EST MOD 30 MIN: CPT | Performed by: NURSE PRACTITIONER

## 2024-08-02 NOTE — PROGRESS NOTES
"Chief Complaint  Diabetes    Subjective        Candi Del Rio presents to [facility] ENDOCRINOLOGY  History of Present Illness    Type 2 dm, 2019   Dm regimen: mounjaro 2.5mg weekly, metformin 1000mg BID, and glimepiride 2mg BIDAC  Last eye exam - 3/2024  Feeling well overall without complaints other than not losing weight as expecting since she has been walking      Cgm review   Avg glu 178  Gmi 7.6%  Very high 4%  High 39%  Target range 57%  Low 0%       Objective   Vital Signs:  /90   Pulse 84   Temp 98.5 °F (36.9 °C) (Oral)   Ht 157.5 cm (62.01\")   Wt 122 kg (269 lb 6.4 oz)   SpO2 98%   BMI 49.26 kg/m²   Estimated body mass index is 49.26 kg/m² as calculated from the following:    Height as of this encounter: 157.5 cm (62.01\").    Weight as of this encounter: 122 kg (269 lb 6.4 oz).             Physical Exam  Vitals reviewed.   Constitutional:       General: She is not in acute distress.  HENT:      Head: Normocephalic and atraumatic.   Cardiovascular:      Rate and Rhythm: Normal rate.   Pulmonary:      Effort: Pulmonary effort is normal. No respiratory distress.   Musculoskeletal:         General: No signs of injury. Normal range of motion.      Cervical back: Normal range of motion and neck supple.   Skin:     General: Skin is warm and dry.   Neurological:      Mental Status: She is alert and oriented to person, place, and time. Mental status is at baseline.   Psychiatric:         Mood and Affect: Mood normal.         Behavior: Behavior normal.         Thought Content: Thought content normal.         Judgment: Judgment normal.        Result Review :    The following data was reviewed by: WESLEY Arreola on 08/02/2024:  Common labs          10/28/2023    03:56 11/2/2023    10:50 5/6/2024    08:22   Common Labs   Glucose 134   176    BUN 10   8    Creatinine 0.73   0.65    Sodium 139   138    Potassium 4.0   3.8    Chloride 105   105    Calcium 8.5   8.4    Albumin 3.7   3.6  "   Total Bilirubin 0.3   0.2    Alkaline Phosphatase 54   57    AST (SGOT) 12   13    ALT (SGPT) 8   8    WBC 5.48  5.11  4.14    Hemoglobin 10.7  11.0  10.8    Hematocrit 32.8  33.4  34.7    Platelets 282  326  291                   Assessment and Plan     Diagnoses and all orders for this visit:    1. Type 2 diabetes mellitus with hyperglycemia, without long-term current use of insulin (Primary)  -     Tirzepatide (Mounjaro) 5 MG/0.5ML solution pen-injector pen; Inject 0.5 mL under the skin into the appropriate area as directed 1 (One) Time Per Week.  Dispense: 2 mL; Refill: 0  -     Lipid Panel  -     Hemoglobin A1c  -     Comprehensive Metabolic Panel  -     Microalbumin / Creatinine Urine Ratio - Urine, Clean Catch  -     TSH    2. Class 3 severe obesity due to excess calories with serious comorbidity and body mass index (BMI) of 45.0 to 49.9 in adult    Other orders  -     Discontinue: Tirzepatide (Mounjaro) 5 MG/0.5ML solution pen-injector pen; Inject 0.5 mL under the skin into the appropriate area as directed 1 (One) Time Per Week.  Dispense: 2 mL; Refill: 0             Follow Up     Return in about 6 months (around 2/2/2025).    Increase mounjaro to 5mg weekly   Labs today  Further recommendations as needed based on results  Cgm reviewed, improving, not quite to goal     Patient was given instructions and counseling regarding her condition or for health maintenance advice. Please see specific information pulled into the AVS if appropriate.     WESLEY Arreola

## 2024-08-29 DIAGNOSIS — E11.65 TYPE 2 DIABETES MELLITUS WITH HYPERGLYCEMIA, WITHOUT LONG-TERM CURRENT USE OF INSULIN: ICD-10-CM

## 2024-08-29 RX ORDER — TIRZEPATIDE 2.5 MG/.5ML
INJECTION, SOLUTION SUBCUTANEOUS
Qty: 2 ML | Refills: 1 | OUTPATIENT
Start: 2024-08-29

## 2024-08-29 NOTE — TELEPHONE ENCOUNTER
Rx Refill Note  Requested Prescriptions     Pending Prescriptions Disp Refills    Mounjaro 2.5 MG/0.5ML solution pen-injector pen [Pharmacy Med Name: MOUNJARO 2.5MG/0.5ML INJ ( 4 PENS)] 2 mL 1     Sig: ADMINISTER 2.5 MG UNDER THE SKIN 1 TIME EVERY WEEK AS DIRECTED. START 2.5 MG UNDER THE SKIN WEEKLY FOR 4 WEEKS THEN GOTO 5 MG WEEKLY      Last office visit with prescribing clinician: 8/2/2024   Last telemedicine visit with prescribing clinician: Visit date not found   Next office visit with prescribing clinician: 2/7/2025                         Would you like a call back once the refill request has been completed: [] Yes [] No    If the office needs to give you a call back, can they leave a voicemail: [] Yes [] No    Maria Guadalupe Quinones MA  08/29/24, 11:45 EDT

## 2024-10-07 ENCOUNTER — PATIENT MESSAGE (OUTPATIENT)
Dept: ENDOCRINOLOGY | Age: 49
End: 2024-10-07
Payer: COMMERCIAL

## 2024-10-08 RX ORDER — BLOOD-GLUCOSE SENSOR
EACH MISCELLANEOUS
Qty: 2 EACH | Refills: 1 | Status: SHIPPED | OUTPATIENT
Start: 2024-10-08

## 2024-10-08 RX ORDER — BLOOD-GLUCOSE SENSOR
1 EACH MISCELLANEOUS TAKE AS DIRECTED
Qty: 6 EACH | Refills: 1 | Status: SHIPPED | OUTPATIENT
Start: 2024-10-08

## 2024-12-05 ENCOUNTER — OFFICE VISIT (OUTPATIENT)
Dept: INTERNAL MEDICINE | Facility: CLINIC | Age: 49
End: 2024-12-05
Payer: COMMERCIAL

## 2024-12-05 ENCOUNTER — HOSPITAL ENCOUNTER (OUTPATIENT)
Dept: GENERAL RADIOLOGY | Facility: HOSPITAL | Age: 49
Discharge: HOME | End: 2024-12-05
Admitting: INTERNAL MEDICINE
Payer: COMMERCIAL

## 2024-12-05 VITALS
HEART RATE: 71 BPM | SYSTOLIC BLOOD PRESSURE: 146 MMHG | HEIGHT: 62 IN | DIASTOLIC BLOOD PRESSURE: 98 MMHG | TEMPERATURE: 98 F | BODY MASS INDEX: 49.37 KG/M2 | OXYGEN SATURATION: 98 % | WEIGHT: 268.3 LBS

## 2024-12-05 DIAGNOSIS — M25.551 RIGHT HIP PAIN: Primary | ICD-10-CM

## 2024-12-05 LAB
BILIRUB BLD-MCNC: NEGATIVE MG/DL
CLARITY, POC: CLEAR
COLOR UR: YELLOW
EXPIRATION DATE: NORMAL
GLUCOSE UR STRIP-MCNC: NEGATIVE MG/DL
KETONES UR QL: NEGATIVE
LEUKOCYTE EST, POC: NEGATIVE
Lab: NORMAL
NITRITE UR-MCNC: NEGATIVE MG/ML
PH UR: 5.5 [PH] (ref 5–8)
PROT UR STRIP-MCNC: NEGATIVE MG/DL
RBC # UR STRIP: NEGATIVE /UL
SP GR UR: 1.03 (ref 1–1.03)
UROBILINOGEN UR QL: NORMAL

## 2024-12-05 PROCEDURE — 81003 URINALYSIS AUTO W/O SCOPE: CPT | Performed by: INTERNAL MEDICINE

## 2024-12-05 PROCEDURE — 73502 X-RAY EXAM HIP UNI 2-3 VIEWS: CPT

## 2024-12-05 PROCEDURE — 99214 OFFICE O/P EST MOD 30 MIN: CPT | Performed by: INTERNAL MEDICINE

## 2024-12-05 RX ORDER — NAPROXEN 500 MG/1
500 TABLET ORAL 2 TIMES DAILY WITH MEALS
Qty: 30 TABLET | Refills: 1 | Status: SHIPPED | OUTPATIENT
Start: 2024-12-05

## 2024-12-05 NOTE — PROGRESS NOTES
Subjective   Candi Del Rio is a 49 y.o. female.   Right side pain for 1 week  Flank Pain     Pain is worse at night  better after a BM and with sitting    no diarrhea no constipation  no dyuria  Ibuprofen does help  Hard to get out of bed    The following portions of the patient's history were reviewed and updated as appropriate: allergies, current medications, past family history, past medical history, past social history, past surgical history, and problem list.    Review of Systems   Genitourinary:  Positive for flank pain.       Objective   Physical Exam  Vitals reviewed.   Constitutional:       Appearance: She is well-developed.   HENT:      Head: Normocephalic and atraumatic.      Right Ear: External ear normal.      Left Ear: External ear normal.   Eyes:      Conjunctiva/sclera: Conjunctivae normal.      Pupils: Pupils are equal, round, and reactive to light.   Neck:      Thyroid: No thyromegaly.      Trachea: No tracheal deviation.   Cardiovascular:      Rate and Rhythm: Normal rate and regular rhythm.      Heart sounds: Normal heart sounds.   Pulmonary:      Effort: Pulmonary effort is normal.      Breath sounds: Normal breath sounds.   Abdominal:      General: Bowel sounds are normal. There is no distension.      Palpations: Abdomen is soft.      Tenderness: There is no abdominal tenderness.   Musculoskeletal:         General: Tenderness present. No deformity. Normal range of motion.      Cervical back: Normal range of motion.      Comments: Pain with palpation the lateral aspect of the hip.  It is the same pain   Skin:     General: Skin is warm and dry.   Neurological:      Mental Status: She is alert and oriented to person, place, and time.   Psychiatric:         Behavior: Behavior normal.         Thought Content: Thought content normal.         Judgment: Judgment normal.       Vitals:    12/05/24 0825   BP: 146/98   Pulse: 71   Temp: 98 °F (36.7 °C)   SpO2: 98%     Body mass index is 49.06 kg/m².          Assessment & Plan   Diagnoses and all orders for this visit:    1. Right hip pain (Primary)  -     Ambulatory Referral to Sports Med & Orthopedics (Non-Surgical)  -     XR hip w or wo pelvis 2-3 view right    Other orders  -     naproxen (Naprosyn) 500 MG tablet; Take 1 tablet by mouth 2 (Two) Times a Day With Meals.  Dispense: 30 tablet; Refill: 1      1.  Right hip pain: His pain is on the lateral aspect of her hip.  I really think this is bursitis.  We are going to do Naprosyn twice a day advised her to take it with food will go ahead and check an x-ray.  And get her referred to sports medicine.

## 2024-12-11 DIAGNOSIS — E11.65 TYPE 2 DIABETES MELLITUS WITH HYPERGLYCEMIA, WITHOUT LONG-TERM CURRENT USE OF INSULIN: ICD-10-CM

## 2024-12-11 RX ORDER — METFORMIN HYDROCHLORIDE 500 MG/1
TABLET, EXTENDED RELEASE ORAL
Qty: 360 TABLET | Refills: 0 | Status: SHIPPED | OUTPATIENT
Start: 2024-12-11

## 2024-12-11 NOTE — TELEPHONE ENCOUNTER
Rx Refill Note  Requested Prescriptions     Pending Prescriptions Disp Refills    metFORMIN ER (GLUCOPHAGE-XR) 500 MG 24 hr tablet [Pharmacy Med Name: METFORMIN ER 500MG 24HR TABS] 360 tablet 0     Sig: TAKE 2 TABLETS BY MOUTH TWICE DAILY AFTER AM AND PM MEALS.      Last office visit with prescribing clinician: 8/2/2024   Last telemedicine visit with prescribing clinician: Visit date not found   Next office visit with prescribing clinician: 2/7/2025                         Would you like a call back once the refill request has been completed: [] Yes [] No    If the office needs to give you a call back, can they leave a voicemail: [] Yes [] No    Maricarmen Gallagher  12/11/24, 11:27 EST

## 2024-12-12 ENCOUNTER — PRIOR AUTHORIZATION (OUTPATIENT)
Dept: ENDOCRINOLOGY | Age: 49
End: 2024-12-12
Payer: COMMERCIAL

## 2024-12-12 NOTE — TELEPHONE ENCOUNTER
Pa started for gregg 3 plus sensor Key: [id number]      Request Reference Number: [id number]. FREE LIBRE3 KIT PLUS/SEN is denied for not meeting the prior authorization requirement(s). Details of this decision are in the notice attached below or have been faxed to you.

## 2024-12-23 RX ORDER — HYDROCHLOROTHIAZIDE 12.5 MG/1
CAPSULE ORAL
Qty: 2 EACH | Refills: 1 | Status: SHIPPED | OUTPATIENT
Start: 2024-12-23

## 2025-01-09 ENCOUNTER — OFFICE VISIT (OUTPATIENT)
Dept: SPORTS MEDICINE | Facility: CLINIC | Age: 50
End: 2025-01-09
Payer: COMMERCIAL

## 2025-01-09 VITALS
DIASTOLIC BLOOD PRESSURE: 88 MMHG | WEIGHT: 268 LBS | OXYGEN SATURATION: 94 % | BODY MASS INDEX: 49.32 KG/M2 | HEART RATE: 91 BPM | TEMPERATURE: 97.3 F | HEIGHT: 62 IN | SYSTOLIC BLOOD PRESSURE: 134 MMHG

## 2025-01-09 DIAGNOSIS — M70.61 TROCHANTERIC BURSITIS OF RIGHT HIP: Primary | ICD-10-CM

## 2025-01-09 RX ORDER — TIRZEPATIDE 5 MG/.5ML
5 INJECTION, SOLUTION SUBCUTANEOUS WEEKLY
COMMUNITY
Start: 2024-12-11

## 2025-01-09 RX ORDER — NAPROXEN 500 MG/1
500 TABLET ORAL 2 TIMES DAILY WITH MEALS
Qty: 30 TABLET | Refills: 1 | Status: SHIPPED | OUTPATIENT
Start: 2025-01-09

## 2025-01-09 NOTE — PROGRESS NOTES
"Candi is a 49 y.o. year old female     Chief Complaint   Patient presents with    Hip Pain     RIGHT HIP- Patient is here for initial evaluation of right hip,        History of Present Illness  History of Present Illness  The patient presents with right hip pain, referred by Dr. Melanie Juarez. Pain in the lateral right hip started 6 weeks ago without specific injury. Initially severe and sharp, it has been improving but worsens when lying on that side at night.    I have reviewed the patient's medical, family, and social history in detail and updated the computerized patient record.    Review of Systems    /88 (BP Location: Right arm, Patient Position: Sitting, Cuff Size: Adult)   Pulse 91   Temp 97.3 °F (36.3 °C) (Temporal)   Ht 157.5 cm (62.01\")   Wt 122 kg (268 lb)   SpO2 94%   BMI 49.00 kg/m²      Physical Exam    Vital signs reviewed.   General: No acute distress.      Physical Exam  Right hip appears normal. Tenderness on the greater trochanter. Normal range of motion with pain on cross-leg stretch. Negative FADIR, МАРИЯ, Stinchfield, and straight leg raise tests. Normal strength throughout.    Results  Results  Outside x-rays of AP pelvis and right hip show subtle bilateral joint space narrowing, otherwise normal.    Procedures     Diagnoses and all orders for this visit:    Trochanteric bursitis of right hip  -     Ambulatory Referral to Physical Therapy for Evaluation & Treatment  -     naproxen (Naprosyn) 500 MG tablet; Take 1 tablet by mouth 2 (Two) Times a Day With Meals.    Other orders  -     Mounjaro 5 MG/0.5ML solution auto-injector; Inject 5 mg under the skin into the appropriate area as directed 1 (One) Time Per Week.      Assessment & Plan  1. Trochanteric bursitis.  Symptoms and physical exam findings suggest trochanteric bursitis. Pain in the lateral right hip started 6 weeks ago without specific injury, initially severe and sharp, now improving but worse when lying on that side at " night. Exam shows tenderness on the greater trochanter, normal range of motion, pain with cross-leg stretch, and negative FADIR, МАРИЯ, Stinchfield, and straight leg raise tests. X-rays show subtle bilateral joint space narrowing. Advised to take naproxen twice daily for 2 weeks, then once daily, and taper off. Discussed possible side effects. Sent refill for naproxen if needed. Can consider injection if needed but already improving today. Provided home exercises and recommended physical therapy.    Follow-up in 4 to 6 weeks.    Patient or patient representative verbalized consent for the use of Ambient Listening during the visit with  Magdy Diego MD for chart documentation. 1/9/2025  08:45 EST    *Dictated after leaving exam room.     Magdy Diego MD   08:43 EST   01/09/25

## 2025-01-10 ENCOUNTER — PATIENT ROUNDING (BHMG ONLY) (OUTPATIENT)
Dept: SPORTS MEDICINE | Facility: CLINIC | Age: 50
End: 2025-01-10
Payer: COMMERCIAL

## 2025-01-10 NOTE — PROGRESS NOTES
January 10, 2025      A [employer] Message has been sent to the patient for PATIENT ROUNDING with [facility]

## 2025-02-07 ENCOUNTER — SPECIALTY PHARMACY (OUTPATIENT)
Dept: ENDOCRINOLOGY | Age: 50
End: 2025-02-07
Payer: COMMERCIAL

## 2025-02-07 ENCOUNTER — OFFICE VISIT (OUTPATIENT)
Dept: ENDOCRINOLOGY | Age: 50
End: 2025-02-07
Payer: COMMERCIAL

## 2025-02-07 VITALS
SYSTOLIC BLOOD PRESSURE: 128 MMHG | DIASTOLIC BLOOD PRESSURE: 70 MMHG | HEART RATE: 68 BPM | TEMPERATURE: 98 F | HEIGHT: 62 IN | OXYGEN SATURATION: 100 % | BODY MASS INDEX: 49.5 KG/M2 | WEIGHT: 269 LBS

## 2025-02-07 DIAGNOSIS — E11.65 TYPE 2 DIABETES MELLITUS WITH HYPERGLYCEMIA, WITHOUT LONG-TERM CURRENT USE OF INSULIN: Primary | ICD-10-CM

## 2025-02-07 DIAGNOSIS — E66.813 CLASS 3 SEVERE OBESITY DUE TO EXCESS CALORIES WITH SERIOUS COMORBIDITY AND BODY MASS INDEX (BMI) OF 45.0 TO 49.9 IN ADULT: ICD-10-CM

## 2025-02-07 DIAGNOSIS — E66.01 CLASS 3 SEVERE OBESITY DUE TO EXCESS CALORIES WITH SERIOUS COMORBIDITY AND BODY MASS INDEX (BMI) OF 45.0 TO 49.9 IN ADULT: ICD-10-CM

## 2025-02-07 PROCEDURE — 95251 CONT GLUC MNTR ANALYSIS I&R: CPT | Performed by: NURSE PRACTITIONER

## 2025-02-07 PROCEDURE — 99214 OFFICE O/P EST MOD 30 MIN: CPT | Performed by: NURSE PRACTITIONER

## 2025-02-07 RX ORDER — HYDROCHLOROTHIAZIDE 12.5 MG/1
1 CAPSULE ORAL TAKE AS DIRECTED
Qty: 6 EACH | Refills: 1 | Status: SHIPPED | OUTPATIENT
Start: 2025-02-07

## 2025-02-07 RX ORDER — TIRZEPATIDE 7.5 MG/.5ML
7.5 INJECTION, SOLUTION SUBCUTANEOUS WEEKLY
Qty: 6 ML | Refills: 1 | Status: SHIPPED | OUTPATIENT
Start: 2025-02-07

## 2025-02-07 NOTE — PROGRESS NOTES
Specialty Pharmacy Patient Management Program  Prior Authorization      Prior Authorization for FreeStyle Brooklynn 3 Plus CGM Sensors was submitted    Authorization / Reference / Case Number:  [id number]    CoverMyMeds Key: [id number]    Will recheck status on 2/10/25, if no response received.      Vaishali Robles, PharmD, BCACP, BC-ADM, [facility]  Clinical Specialty Pharmacist, Endocrinology  2/7/2025  09:23 EST

## 2025-02-07 NOTE — PROGRESS NOTES
Specialty Pharmacy Patient Management Program  Endocrinology Refill Outreach      Candi is a 49 y.o. female contacted today regarding refills of her medication(s).    Specialty medication(s) and dose(s) confirmed: Mounjaro 7.5mg weekly (dose increase)      Delivery Questions      Flowsheet Row Most Recent Value   Delivery method  at Pharmacy   Number of medications in delivery 1   Medication(s) being filled and delivered Tirzepatide (Mounjaro)   Copay verified? Yes   Copay amount $12.00   Copay form of payment Pay at pickup            Follow-Up: 21 days     Vaishali Robles, PharmD, BCACP, BC-ADM, [facility]  Clinical Specialty Pharmacist, Endocrinology  2/7/2025  09:25 EST

## 2025-02-07 NOTE — PROGRESS NOTES
Specialty Pharmacy Patient Management Program  Endocrinology Initial Assessment     Candi Del Rio was referred by an Endocrinology provider to the Endocrinology Patient Management program offered by [facility] for Type 2 Diabetes on 25.  An initial outreach was conducted, including assessment of therapy appropriateness and specialty medication education for Mounjaro. The patient was introduced to services offered by [facility], including: regular assessments, refill coordination, curbside pick-up or mail order delivery options, prior authorization maintenance, and financial assistance programs as applicable. The patient was also provided with contact information for the pharmacy team.     Insurance Coverage & Financial Support  [employer]      Relevant Past Medical History and Comorbidities  Relevant medical history and concomitant health conditions were discussed with the patient. The patient's chart has been reviewed for relevant past medical history and comorbid conditions and updated as necessary.  Past Medical History:   Diagnosis Date    1st degree AV block     Abnormal uterine bleeding     Anxiety     Colon polyps     Fibroid     Glaucoma     History of transfusion     as     Iron deficiency anemia     Morbidly obese     APRIL (obstructive sleep apnea)     had normal sleep study per pt    Paroxysmal A-fib     dx after syncopal episode 2021, saw cardiology and had normal testing    Slow to wake up after anesthesia     after first  and lap ann    Syncope and collapse 2021    saw neuro, questionable seizure, had work up    Type 2 diabetes mellitus with hyperglycemia 2019    Vitamin B12 deficiency     Vitamin D deficiency      Social History     Socioeconomic History    Marital status:      Spouse name: WILL    Number of children: 2   Tobacco Use    Smoking status: Never    Smokeless tobacco: Never   Vaping Use    Vaping status: Never  Used   Substance and Sexual Activity    Alcohol use: No    Drug use: No    Sexual activity: Yes     Partners: Male     Birth control/protection: Surgical     Comment: BTL       Problem list reviewed by Vaishali Robles PharmD on 2/7/2025 at  9:18 AM    Allergies  Known allergies and reactions were discussed with the patient. The patient's chart has been reviewed for  allergy information and updated as necessary.   No Known Allergies    Allergies reviewed by Vaishali Robles PharmD on 2/7/2025 at  9:18 AM    Relevant Laboratory Values  Relevant laboratory values were discussed with the patient. The following specialty medication dose adjustment(s) are recommended: A1c not yet at goal - labs to be drawn today  A1C Last 3 Results          2/7/2025    09:16   HGBA1C Last 3 Results   Hemoglobin A1C 9.1      Lab Results   Component Value Date    HGBA1C 9.1 (H) 02/07/2025     Lab Results   Component Value Date    GLUCOSE 212 (H) 02/07/2025    CALCIUM 9.1 02/07/2025     02/07/2025    K 4.0 02/07/2025    CO2 25 02/07/2025     02/07/2025    BUN 9 02/07/2025    CREATININE 0.74 02/07/2025    EGFRIFAFRI 109 10/25/2021    EGFRIFNONA 94 10/25/2021    BCR 12 02/07/2025    ANIONGAP 10.1 05/06/2024     Lab Results   Component Value Date    CHLPL 175 02/07/2025    TRIG 132 02/07/2025    HDL 54 02/07/2025    LDL 98 02/07/2025     Microalbumin          2/7/2025    09:16   Microalbumin   Microalbumin, Urine 17.6        Current Medication List  This medication list has been reviewed with the patient and evaluated for any interactions or necessary modifications/recommendations, and updated to include all prescription medications, OTC medications, and supplements the patient is currently taking.  This list reflects what is contained in the patient's profile, which has also been marked as reviewed to communicate to other providers it is the most up to date version of the patient's current medication therapy.     Current  Outpatient Medications:     Continuous Glucose Sensor (FreeStyle Brooklynn 3 Plus Sensor), Use 1 each Take As Directed. Use 1 every 15 days, Disp: 6 each, Rfl: 1    glucose blood (True Metrix Blood Glucose Test) test strip, Check 1 time a day not on insulin tx, poor control, hypoglycemia. Dx: E 11.65, Disp: 100 each, Rfl: 4    metFORMIN ER (GLUCOPHAGE-XR) 500 MG 24 hr tablet, TAKE 2 TABLETS BY MOUTH TWICE DAILY AFTER AM AND PM MEALS., Disp: 360 tablet, Rfl: 0    Mounjaro 7.5 MG/0.5ML solution auto-injector, Inject 7.5 mg under the skin into the appropriate area as directed 1 (One) Time Per Week., Disp: 6 mL, Rfl: 1    naproxen (Naprosyn) 500 MG tablet, Take 1 tablet by mouth 2 (Two) Times a Day With Meals. (Patient not taking: Reported on 2/7/2025), Disp: 30 tablet, Rfl: 1    TRUEplus Lancets 30G misc, Check 1 time a day not on insulin tx, poor control, hypoglycemia. Dx: E 11.65, Disp: 100 each, Rfl: 4    vitamin B-12 (CYANOCOBALAMIN) 1000 MCG tablet, TAKE 1 TABLET BY MOUTH EVERY DAY FOR 90 DAYS, Disp: 90 tablet, Rfl: 0    vitamin D (ERGOCALCIFEROL) 1.25 MG (74637 UT) capsule capsule, TAKE 1 CAPSULE BY MOUTH 3 TIMES A WEEK, Disp: 36 capsule, Rfl: 2    Medicines reviewed by Vaishali Robles, PharmD on 2/7/2025 at  9:18 AM    Drug Interactions  No Clinically Significant DDIs Were Identified at Present Time Upon Marking Medications Reviewed     Recommended Medications Assessment  Aspirin: Not Taking Currently  Statin: Not Taking Currently  ACEi/ARB: Not Taking Currently    Initial Education Provided for Specialty Medication  The patient has been provided with the following education and any applicable administration techniques (i.e. self-injection) have been demonstrated for the therapies indicated. All questions and concerns have been addressed prior to the patient receiving the medication, and the patient has verbalized comprehension of the education and any materials provided. Additional patient education shall be  provided and documented upon request by the patient, provider, or payer.    Mounjaro® (tirzepatide)   How long will I be on this medication for?  The amount of time you will be on this medication will be determined by your doctor based on blood sugar and A1c control. You will most likely be on this medication or another diabetes medication throughout your lifetime. Do not abruptly stop this medication without talking to your doctor first.     How do I take this medication?  Take as directed on your prescription label. Mounjaro is supplied in a single-use pen for each dose and you will use a new pre-filled pen each week.  It is injected under the skin (subcutaneously) of your stomach, thigh or upper arm.  You may inject in the same body area each week, on the same day each week, with or without food.      What are some possible side effects?  In addition to decreased appetite, the most common side effects are nausea and indigestion. Redness, itching, and/or swelling at the injection site may occur. You should also monitor for low blood sugar (hypoglycemia) if you are taking Mounjaro with other medications that cause low blood sugar.     What happens if I miss a dose?  If you miss a dose, take it as soon as you remember as long as there are at least 3 days until the next scheduled dose.  If there are less than 3 days, skip the missed dose and resume  Mounjaro on the regularly scheduled day.  Do not take 2 doses at the same time or extra doses.      Medication Safety    What are things I should warn my doctor immediately about?  Do not use Mounjaro if you or a family member have ever had medullary thyroid cancer (MTC) or Multiple Endocrine Neoplasia syndrome type 2 (MEN 2).  Tell your doctor if you have or have had problems with your kidneys or pancreas.  Talk to your doctor if you are pregnant, planning to become pregnant, or breastfeeding. Stop using Mounjaro and get medical help right away if you have severe pain in  your stomach area that will not go away, or if you notice any signs/symptoms of an allergic reaction (rash, hives, difficulty breathing, etc.).    What are things that I should be cautious of?  Be cautious of any side effects from this medication. Talk to your doctor if any new ones develop or aren't getting better.    What are some medications that can interact with this one?  Taking Mounjaro with other medications that also lower your blood sugar such as insulin and glipizide/glimepiride/glyburide may increase the risk of low blood sugar. Your doctor may reduce the dose of these medications when you start Mounjaro to minimize low blood sugars.  Always tell your doctor or pharmacist immediately if you start taking any new medications, including over-the-counter medications, vitamins, and herbal supplements.        Medication Storage/Handling    How should I handle this medication?  Keep this medication out of reach of pets/children and keep the pen capped when not in use.  Do not share your medicine pens with others.    How does this medication need to be stored?  Store Mounjaro in the refrigerator. Do not freeze and do not use if Mounjaro has been frozen.  You may store your Mounjaro pens at room temperature for up to 21 days.  Protect from excessive heat and sunlight.  Keep in the original carton until time of administration.    How should I dispose of this medication?  Used Mounjaro pens should discarded after each use in an approved sharps container after use.  If you do not have a sharps container, you may use a household container made of heavy-duty plastic with a tight-fitting lid that is leak resistant (e.g., heavy-duty plastic laundry detergent bottle). If your doctor decides to stop this medication, take to your local police station for proper disposal. Some pharmacies also have take-back bins for medication drop-off.       Resources/Support    How can I remind myself to take this medication?  You can  "download reminder apps to help you manage your refills. You may also set an alarm on your phone to remind you.     Is financial support available?   [employer] can provide co-pay cards if you have commercial insurance or patient assistance if you have Medicare or no insurance.     Which vaccines are recommended for me?  Talk to your doctor about these vaccines: Flu, Coronavirus (COVID-19), Pneumococcal (pneumonia), Tdap, Hepatitis B, Zoster (shingles)        Adherence and Self-Administration  Adherence related to the patient's specialty therapy was discussed with the patient. The Adherence segment of this outreach has been reviewed and updated.     Is there a concern with patient's ability to self administer the medication correctly and without issue?: No  Were any potential barriers to adherence identified during the initial assessment or patient education?: No  Are there any concerns regarding the patient's understanding of the importance of medication adherence?: No  Methods for Supporting Patient Adherence and/or Self-Administration: n/a    Open Medication Therapy Problems  No medication therapy recommendations to display    Goals of Therapy  Goals related to the patient's specialty therapy were discussed with the patient. The Patient Goals segment of this outreach has been reviewed and updated.   Goals Addressed Today        Specialty Pharmacy General Goal      Clinical Goal: Achieve A1c <7%    Baseline Value:  Lab Results   Component Value Date    HGBA1C 9.1 (H) 02/07/2025        Progress:   HGBA1C Date     02/07/2025 New Enrollment - Patient instructed to \"Increase mounjaro to 7.5mg and stop glimepirde \"    9.1 (H) 02/07/2025    7.60 (H) 10/26/2023    7.9 (H) 08/02/2023    8.00 (H) 01/24/2023    8.70 (H) 11/16/2022                             Reassessment Plan & Follow-Up  1. Medication Therapy Changes: INCREASE Mounjaro to 7.5mg weekly (from 5mg weekly)  2. Related Plans, Therapy Recommendations, or Therapy " Problems to Be Addressed: will attempt to get coverage for FreeStyle Brooklynn 3 + CGM sensors   3. Pharmacist to perform regular assessments no more than (6) months from the previous assessment.  4. Care Coordinator to set up future refill outreaches, coordinate prescription delivery, and escalate clinical questions to pharmacist.  5. Welcome information and patient satisfaction survey to be sent by specialty pharmacy team with patient's initial fill.    Attestation  Therapeutic appropriateness: Appropriate   I attest the patient was actively involved in and has agreed to the above plan of care. If the prescribed therapy is at any point deemed not appropriate based on the current or future assessments, a consultation will be initiated with the patient's specialty care provider to determine the best course of action. The revised plan of therapy will be documented along with any required assessments and/or additional patient education provided.     Vaishali Robles, PharmD, BCACP, BC-ADM, [facility]  Clinical Specialty Pharmacist, Endocrinology  2/13/2025  14:56 EST    Discussed the aforementioned information with the patient via In-Person.

## 2025-02-07 NOTE — PROGRESS NOTES
"Chief Complaint  Diabetes    Subjective        Candi Del Rio presents to [facility] ENDOCRINOLOGY  History of Present Illness    Bong does  not keep up with her gregg and mounjaro refills consistently     Type 2 dm, 2019   Dm regimen: mounjaro 5mg weekly, metformin 1000mg BID, and glimepiride 2mg BIDAC  Last eye exam: 3/2024  Has been less active as her mom has been sick   no known diabetic complications     Cgm review 1/20/25-2/2/25   Avg glu 174  Gmi 7.5%  Very high 3%  High 37%  Target range 60%  Low 0%    Objective   Vital Signs:  /70   Pulse 68   Temp 98 °F (36.7 °C) (Oral)   Ht 157.5 cm (62.01\")   Wt 122 kg (269 lb)   SpO2 100%   BMI 49.19 kg/m²   Estimated body mass index is 49.19 kg/m² as calculated from the following:    Height as of this encounter: 157.5 cm (62.01\").    Weight as of this encounter: 122 kg (269 lb).        Physical Exam  Vitals reviewed.   Constitutional:       General: She is not in acute distress.  HENT:      Head: Normocephalic and atraumatic.   Cardiovascular:      Rate and Rhythm: Normal rate.   Pulmonary:      Effort: Pulmonary effort is normal. No respiratory distress.   Musculoskeletal:         General: No signs of injury. Normal range of motion.      Cervical back: Normal range of motion and neck supple.   Skin:     General: Skin is warm and dry.   Neurological:      Mental Status: She is alert and oriented to person, place, and time. Mental status is at baseline.   Psychiatric:         Mood and Affect: Mood normal.         Behavior: Behavior normal.         Thought Content: Thought content normal.         Judgment: Judgment normal.          Result Review :  The following data was reviewed by: WESLEY Arreola on 02/07/2025:  Common labs          5/6/2024    08:22   Common Labs   Glucose 176    BUN 8    Creatinine 0.65    Sodium 138    Potassium 3.8    Chloride 105    Calcium 8.4    Albumin 3.6    Total Bilirubin 0.2    Alkaline Phosphatase " 57    AST (SGOT) 13    ALT (SGPT) 8    WBC 4.14    Hemoglobin 10.8    Hematocrit 34.7    Platelets 291                Assessment and Plan   Diagnoses and all orders for this visit:    1. Type 2 diabetes mellitus with hyperglycemia, without long-term current use of insulin (Primary)  -     Lipid Panel  -     Hemoglobin A1c  -     Comprehensive Metabolic Panel  -     Microalbumin / Creatinine Urine Ratio - Urine, Clean Catch  -     TSH  -     C-Peptide    2. Class 3 severe obesity due to excess calories with serious comorbidity and body mass index (BMI) of 45.0 to 49.9 in adult    Other orders  -     Continuous Glucose Sensor (FreeStyle Brooklynn 3 Plus Sensor); Use 1 each Take As Directed. Use 1 every 15 days  Dispense: 6 each; Refill: 1  -     Mounjaro 7.5 MG/0.5ML solution auto-injector; Inject 7.5 mg under the skin into the appropriate area as directed 1 (One) Time Per Week.  Dispense: 6 mL; Refill: 1             Follow Up   Return in about 6 months (around 8/7/2025).    Increase mounjaro to 7.5mg and stop glimepirde  Labs today  Cgm reviewed, not quite at goal   Brooklynn 3 samples provided until insurance coverage determined     Patient was given instructions and counseling regarding her condition or for health maintenance advice. Please see specific information pulled into the AVS if appropriate.       WESLEY Arreola

## 2025-02-08 LAB
ALBUMIN SERPL-MCNC: 3.9 G/DL (ref 3.9–4.9)
ALBUMIN/CREAT UR: 11 MG/G CREAT (ref 0–29)
ALP SERPL-CCNC: 70 IU/L (ref 44–121)
ALT SERPL-CCNC: 19 IU/L (ref 0–32)
AST SERPL-CCNC: 16 IU/L (ref 0–40)
BILIRUB SERPL-MCNC: 0.3 MG/DL (ref 0–1.2)
BUN SERPL-MCNC: 9 MG/DL (ref 6–24)
BUN/CREAT SERPL: 12 (ref 9–23)
C PEPTIDE SERPL-MCNC: 2.5 NG/ML (ref 1.1–4.4)
CALCIUM SERPL-MCNC: 9.1 MG/DL (ref 8.7–10.2)
CHLORIDE SERPL-SCNC: 101 MMOL/L (ref 96–106)
CHOLEST SERPL-MCNC: 175 MG/DL (ref 100–199)
CO2 SERPL-SCNC: 25 MMOL/L (ref 20–29)
CREAT SERPL-MCNC: 0.74 MG/DL (ref 0.57–1)
CREAT UR-MCNC: 163.2 MG/DL
EGFRCR SERPLBLD CKD-EPI 2021: 99 ML/MIN/1.73
GLOBULIN SER CALC-MCNC: 2.9 G/DL (ref 1.5–4.5)
GLUCOSE SERPL-MCNC: 212 MG/DL (ref 70–99)
HBA1C MFR BLD: 9.1 % (ref 4.8–5.6)
HDLC SERPL-MCNC: 54 MG/DL
IMP & REVIEW OF LAB RESULTS: NORMAL
LDLC SERPL CALC-MCNC: 98 MG/DL (ref 0–99)
MICROALBUMIN UR-MCNC: 17.6 UG/ML
POTASSIUM SERPL-SCNC: 4 MMOL/L (ref 3.5–5.2)
PROT SERPL-MCNC: 6.8 G/DL (ref 6–8.5)
SODIUM SERPL-SCNC: 139 MMOL/L (ref 134–144)
TRIGL SERPL-MCNC: 132 MG/DL (ref 0–149)
TSH SERPL DL<=0.005 MIU/L-ACNC: 2.57 UIU/ML (ref 0.45–4.5)
VLDLC SERPL CALC-MCNC: 23 MG/DL (ref 5–40)

## 2025-02-12 ENCOUNTER — SPECIALTY PHARMACY (OUTPATIENT)
Dept: ENDOCRINOLOGY | Age: 50
End: 2025-02-12
Payer: COMMERCIAL

## 2025-02-12 NOTE — PROGRESS NOTES
"   Specialty Pharmacy Patient Management Program  Refill Outreach     FSL Sensor PA denied for \"You do not meet the clinical requirements for this medication.\" Have reached out to pharmacist to see if we want to try an appeal or try to PA for Dexcom.    SUELLEN WOODWARD Key: [id number]  SUELLEN WOODWARD Case ID: [id number]      Yesi Monteiro, Pharmacy Technician  2/12/2025  11:26 EST    "

## 2025-02-13 ENCOUNTER — SPECIALTY PHARMACY (OUTPATIENT)
Dept: ENDOCRINOLOGY | Age: 50
End: 2025-02-13
Payer: COMMERCIAL

## 2025-02-13 NOTE — PROGRESS NOTES
Specialty Pharmacy Patient Management Program  Refill Outreach     Freestyle Snesor PA denied, submitted PA for Decxom G7 2/13/2025.    SUELLEN WOODWARD Key: [id number]  SUELLEN WOODWARD Case ID: [id number]     Yesi Monteiro, Pharmacy Technician  2/13/2025  14:41 EST

## 2025-02-17 ENCOUNTER — TELEPHONE (OUTPATIENT)
Dept: ENDOCRINOLOGY | Age: 50
End: 2025-02-17
Payer: COMMERCIAL

## 2025-02-17 NOTE — TELEPHONE ENCOUNTER
----- Message from Yesi Spicer sent at 2/17/2025  9:50 AM EST -----  A1c is not at goal  Mounjaro dose was increase at visit and glimperide was stopped. Please change August appt to April ( 3m f/u instead of 6m) with labs before the visit

## 2025-03-21 ENCOUNTER — SPECIALTY PHARMACY (OUTPATIENT)
Dept: ENDOCRINOLOGY | Age: 50
End: 2025-03-21
Payer: COMMERCIAL

## 2025-03-21 NOTE — PROGRESS NOTES
Specialty Pharmacy Patient Management Program  Program Disenrollment      Candi Del Rio is a 49 y.o. female seen by an Endocrinology provider for Type 2 Diabetes. Patient was previously enrolled in the Endocrinology Patient Management program offered by [facility].      Sending a disenrollment letter to patient today as we have been unable to reach after multiple attempts. If we do not hear from patient within 7 days of sending the letter, they will be temporarily disenrolled from the specialty pharmacy program.        Vaishali Robles, PharmD, BCACP, BC-ADM, [facility]  Clinical Specialty Pharmacist, Endocrinology  3/21/2025  12:29 EDT

## 2025-03-31 ENCOUNTER — SPECIALTY PHARMACY (OUTPATIENT)
Dept: ENDOCRINOLOGY | Age: 50
End: 2025-03-31
Payer: COMMERCIAL

## 2025-03-31 NOTE — PROGRESS NOTES
Specialty Pharmacy Patient Management Program  Program Disenrollment      Candi Del Rio is a 49 y.o. female seen by an Endocrinology provider for Type 2 Diabetes. Patient was previously enrolled in the Endocrinology Patient Management program offered by [facility].      Patient disenrolled as we have been unable to reach patient after multiple attempts, including a letter mailed to their home.    It has been a pleasure taking part in this patients care and we would be happy to enroll them into the speciality pharmacy program again in the future.       Vaishali Robles, PharmD, BCACP, BC-ADM, [facility]  Clinical Specialty Pharmacist, Endocrinology  3/31/2025  08:09 EDT

## 2025-04-15 DIAGNOSIS — E11.65 TYPE 2 DIABETES MELLITUS WITH HYPERGLYCEMIA, WITHOUT LONG-TERM CURRENT USE OF INSULIN: ICD-10-CM

## 2025-04-16 ENCOUNTER — PRIOR AUTHORIZATION (OUTPATIENT)
Dept: ENDOCRINOLOGY | Age: 50
End: 2025-04-16
Payer: COMMERCIAL

## 2025-04-16 RX ORDER — METFORMIN HYDROCHLORIDE 500 MG/1
500 TABLET, EXTENDED RELEASE ORAL
Qty: 360 TABLET | Refills: 0 | Status: SHIPPED | OUTPATIENT
Start: 2025-04-16

## 2025-04-16 NOTE — TELEPHONE ENCOUNTER
Rx Refill Note  Requested Prescriptions     Pending Prescriptions Disp Refills    metFORMIN ER (GLUCOPHAGE-XR) 500 MG 24 hr tablet 360 tablet 0     Sig: Take 1 tablet by mouth Daily With Breakfast.      Last office visit with prescribing clinician: 2/7/2025   Last telemedicine visit with prescribing clinician: Visit date not found   Next office visit with prescribing clinician: 5/6/2025                         Would you like a call back once the refill request has been completed: [] Yes [] No    If the office needs to give you a call back, can they leave a voicemail: [] Yes [] No    Leena Gallagher MA  04/16/25, 08:03 EDT

## 2025-04-16 NOTE — TELEPHONE ENCOUNTER
Pa started for gregg 3 plus sensor Key: [id number]      Denied today by [employer] [facility]  Request Reference Number: [id number]. FREE LIBRE3 KIT PLUS/SEN is denied for not meeting the prior authorization requirement(s). Details of this decision are in the notice attached below or have been faxed to you.

## 2025-05-06 ENCOUNTER — OFFICE VISIT (OUTPATIENT)
Dept: ENDOCRINOLOGY | Age: 50
End: 2025-05-06
Payer: COMMERCIAL

## 2025-05-06 VITALS
BODY MASS INDEX: 47.41 KG/M2 | OXYGEN SATURATION: 98 % | DIASTOLIC BLOOD PRESSURE: 74 MMHG | TEMPERATURE: 98.2 F | WEIGHT: 257.6 LBS | SYSTOLIC BLOOD PRESSURE: 130 MMHG | HEART RATE: 89 BPM | HEIGHT: 62 IN

## 2025-05-06 DIAGNOSIS — E11.65 TYPE 2 DIABETES MELLITUS WITH HYPERGLYCEMIA, WITH LONG-TERM CURRENT USE OF INSULIN: Primary | ICD-10-CM

## 2025-05-06 DIAGNOSIS — E66.01 CLASS 3 SEVERE OBESITY DUE TO EXCESS CALORIES WITH SERIOUS COMORBIDITY AND BODY MASS INDEX (BMI) OF 45.0 TO 49.9 IN ADULT: ICD-10-CM

## 2025-05-06 DIAGNOSIS — Z79.4 TYPE 2 DIABETES MELLITUS WITH HYPERGLYCEMIA, WITH LONG-TERM CURRENT USE OF INSULIN: Primary | ICD-10-CM

## 2025-05-06 DIAGNOSIS — E66.813 CLASS 3 SEVERE OBESITY DUE TO EXCESS CALORIES WITH SERIOUS COMORBIDITY AND BODY MASS INDEX (BMI) OF 45.0 TO 49.9 IN ADULT: ICD-10-CM

## 2025-05-06 PROCEDURE — 99214 OFFICE O/P EST MOD 30 MIN: CPT | Performed by: NURSE PRACTITIONER

## 2025-05-06 RX ORDER — ACYCLOVIR 400 MG/1
1 TABLET ORAL
Qty: 9 EACH | Refills: 1 | Status: SHIPPED | OUTPATIENT
Start: 2025-05-06

## 2025-05-06 NOTE — PROGRESS NOTES
"Chief Complaint  Diabetes    Subjective        Candi Del Rio presents to [facility] ENDOCRINOLOGY  History of Present Illness    Type 2 dm, 2019   Dm regimen: mounjaro 7.5mg weekly- nausea and gi side effects are not tolerable, and metformin 500mg QD  Last eye exam: 3/2024  no known diabetic complications     Objective   Vital Signs:  /74   Pulse 89   Temp 98.2 °F (36.8 °C) (Oral)   Ht 157.5 cm (62.01\")   Wt 117 kg (257 lb 9.6 oz)   SpO2 98%   BMI 47.10 kg/m²   Estimated body mass index is 47.1 kg/m² as calculated from the following:    Height as of this encounter: 157.5 cm (62.01\").    Weight as of this encounter: 117 kg (257 lb 9.6 oz).        Physical Exam  Vitals reviewed.   Constitutional:       General: She is not in acute distress.  HENT:      Head: Normocephalic and atraumatic.   Cardiovascular:      Rate and Rhythm: Normal rate.   Pulmonary:      Effort: Pulmonary effort is normal. No respiratory distress.   Musculoskeletal:         General: No signs of injury. Normal range of motion.      Cervical back: Normal range of motion and neck supple.   Skin:     General: Skin is warm and dry.   Neurological:      Mental Status: She is alert and oriented to person, place, and time. Mental status is at baseline.   Psychiatric:         Mood and Affect: Mood normal.         Behavior: Behavior normal.         Thought Content: Thought content normal.         Judgment: Judgment normal.          Result Review :  The following data was reviewed by: WESLEY Arreola on 05/06/2025:  Common labs          2/7/2025    09:16   Common Labs   Glucose 212    BUN 9    Creatinine 0.74    Sodium 139    Potassium 4.0    Chloride 101    Calcium 9.1    Albumin 3.9    Total Bilirubin 0.3    Alkaline Phosphatase 70    AST (SGOT) 16    ALT (SGPT) 19    Total Cholesterol 175    Triglycerides 132    HDL Cholesterol 54    LDL Cholesterol  98    Hemoglobin A1C 9.1    Microalbumin, Urine 17.6              "   Assessment and Plan   Diagnoses and all orders for this visit:    1. Type 2 diabetes mellitus with hyperglycemia, with long-term current use of insulin (Primary)  -     Continuous Glucose Sensor (Dexcom G7 Sensor) misc; Use 1 each Every 10 (Ten) Days.  Dispense: 9 each; Refill: 1  -     Hemoglobin A1c    2. Class 3 severe obesity due to excess calories with serious comorbidity and body mass index (BMI) of 45.0 to 49.9 in adult             Follow Up   Return in about 3 months (around 8/6/2025).    Wants to use 7.5mg mounjaro a bit longer to see if  the side effects improved- discussed dietary changes to focus on as well as poriton control  Down 10 pounds  Labs today  Ok to stop metformin altogether  Additional recommendations to follow as needed   May need to consider insulin if mounjaro not tolerated and/or A1c not improving     Patient was given instructions and counseling regarding her condition or for health maintenance advice. Please see specific information pulled into the AVS if appropriate.       Yesi Spicer APRN

## 2025-05-07 LAB — HBA1C MFR BLD: 9.4 % (ref 4.8–5.6)

## 2025-07-23 DIAGNOSIS — E11.65 TYPE 2 DIABETES MELLITUS WITH HYPERGLYCEMIA, UNSPECIFIED WHETHER LONG TERM INSULIN USE: ICD-10-CM

## 2025-07-23 RX ORDER — GLIMEPIRIDE 2 MG/1
TABLET ORAL
Qty: 180 TABLET | Refills: 0 | OUTPATIENT
Start: 2025-07-23

## 2025-07-23 NOTE — TELEPHONE ENCOUNTER
Rx Refill Note  Requested Prescriptions     Pending Prescriptions Disp Refills    glimepiride (AMARYL) 2 MG tablet [Pharmacy Med Name: GLIMEPIRIDE 2MG TABLETS] 180 tablet 0     Sig: TAKE 1 TABLET BY MOUTH TWICE DAILY WITH THE MORNING AND EVENING MEAL      Last office visit with prescribing clinician: 5/6/2025   Last telemedicine visit with prescribing clinician: Visit date not found   Next office visit with prescribing clinician: 8/7/2025                         Would you like a call back once the refill request has been completed: [] Yes [] No    If the office needs to give you a call back, can they leave a voicemail: [] Yes [] No    Maria Guadalupe Quinones MA  07/23/25, 09:18 EDT

## 2025-08-07 ENCOUNTER — OFFICE VISIT (OUTPATIENT)
Dept: ENDOCRINOLOGY | Age: 50
End: 2025-08-07
Payer: COMMERCIAL

## 2025-08-07 VITALS
HEART RATE: 75 BPM | DIASTOLIC BLOOD PRESSURE: 80 MMHG | BODY MASS INDEX: 46.38 KG/M2 | OXYGEN SATURATION: 96 % | HEIGHT: 62 IN | SYSTOLIC BLOOD PRESSURE: 130 MMHG | TEMPERATURE: 98.5 F | WEIGHT: 252 LBS

## 2025-08-07 DIAGNOSIS — Z79.4 TYPE 2 DIABETES MELLITUS WITH HYPERGLYCEMIA, WITH LONG-TERM CURRENT USE OF INSULIN: Primary | ICD-10-CM

## 2025-08-07 DIAGNOSIS — E66.813 CLASS 3 SEVERE OBESITY DUE TO EXCESS CALORIES WITH SERIOUS COMORBIDITY AND BODY MASS INDEX (BMI) OF 45.0 TO 49.9 IN ADULT: ICD-10-CM

## 2025-08-07 DIAGNOSIS — E11.65 TYPE 2 DIABETES MELLITUS WITH HYPERGLYCEMIA, WITH LONG-TERM CURRENT USE OF INSULIN: Primary | ICD-10-CM

## 2025-08-07 PROCEDURE — 99214 OFFICE O/P EST MOD 30 MIN: CPT | Performed by: NURSE PRACTITIONER

## 2025-08-07 RX ORDER — GLIMEPIRIDE 2 MG/1
2 TABLET ORAL
COMMUNITY

## 2025-08-08 LAB
BUN SERPL-MCNC: 5 MG/DL (ref 6–24)
BUN/CREAT SERPL: 7 (ref 9–23)
C PEPTIDE SERPL-MCNC: 2.3 NG/ML (ref 1.1–4.4)
CALCIUM SERPL-MCNC: 9.1 MG/DL (ref 8.7–10.2)
CHLORIDE SERPL-SCNC: 102 MMOL/L (ref 96–106)
CO2 SERPL-SCNC: 25 MMOL/L (ref 20–29)
CREAT SERPL-MCNC: 0.71 MG/DL (ref 0.57–1)
EGFRCR SERPLBLD CKD-EPI 2021: 104 ML/MIN/1.73
GLUCOSE SERPL-MCNC: 286 MG/DL (ref 70–99)
HBA1C MFR BLD: 11.4 % (ref 4.8–5.6)
POTASSIUM SERPL-SCNC: 3.6 MMOL/L (ref 3.5–5.2)
SODIUM SERPL-SCNC: 140 MMOL/L (ref 134–144)

## 2025-08-19 ENCOUNTER — RESULTS FOLLOW-UP (OUTPATIENT)
Dept: ENDOCRINOLOGY | Age: 50
End: 2025-08-19
Payer: COMMERCIAL

## 2025-08-19 DIAGNOSIS — Z79.4 TYPE 2 DIABETES MELLITUS WITH HYPERGLYCEMIA, WITH LONG-TERM CURRENT USE OF INSULIN: ICD-10-CM

## 2025-08-19 DIAGNOSIS — E11.65 TYPE 2 DIABETES MELLITUS WITH HYPERGLYCEMIA, WITH LONG-TERM CURRENT USE OF INSULIN: ICD-10-CM

## 2025-08-21 RX ORDER — ACYCLOVIR 400 MG/1
1 TABLET ORAL
Qty: 9 EACH | Refills: 1 | Status: SHIPPED | OUTPATIENT
Start: 2025-08-21

## 2025-08-21 RX ORDER — PEN NEEDLE, DIABETIC 30 GX3/16"
1 NEEDLE, DISPOSABLE MISCELLANEOUS DAILY
Qty: 100 EACH | Refills: 5 | Status: SHIPPED | OUTPATIENT
Start: 2025-08-21

## 2025-08-21 RX ORDER — INSULIN GLARGINE 300 U/ML
INJECTION, SOLUTION SUBCUTANEOUS
Qty: 6 ML | Refills: 0 | Status: SHIPPED | OUTPATIENT
Start: 2025-08-21 | End: 2025-08-25

## 2025-08-22 ENCOUNTER — CLINICAL SUPPORT (OUTPATIENT)
Dept: ENDOCRINOLOGY | Age: 50
End: 2025-08-22
Payer: COMMERCIAL

## 2025-08-22 ENCOUNTER — DOCUMENTATION (OUTPATIENT)
Dept: ENDOCRINOLOGY | Age: 50
End: 2025-08-22

## 2025-08-22 ENCOUNTER — SPECIMEN COLLECTION (OUTPATIENT)
Dept: ENDOCRINOLOGY | Age: 50
End: 2025-08-22
Payer: COMMERCIAL

## 2025-08-22 ENCOUNTER — PRIOR AUTHORIZATION (OUTPATIENT)
Dept: ENDOCRINOLOGY | Age: 50
End: 2025-08-22
Payer: COMMERCIAL

## 2025-08-22 DIAGNOSIS — E11.65 UNCONTROLLED TYPE 2 DIABETES MELLITUS WITH HYPERGLYCEMIA: Primary | ICD-10-CM

## 2025-08-22 DIAGNOSIS — E11.49 DM (DIABETES MELLITUS), TYPE 2 WITH NEUROLOGICAL COMPLICATIONS: Primary | ICD-10-CM

## 2025-08-22 LAB — GLUCOSE BLDC GLUCOMTR-MCNC: 231 MG/DL (ref 70–130)

## 2025-08-22 PROCEDURE — 36416 COLLJ CAPILLARY BLOOD SPEC: CPT | Performed by: NURSE PRACTITIONER

## 2025-08-22 PROCEDURE — 82948 REAGENT STRIP/BLOOD GLUCOSE: CPT | Performed by: NURSE PRACTITIONER

## 2025-08-22 RX ORDER — ACYCLOVIR 400 MG/1
1 TABLET ORAL
Qty: 1 EACH | Refills: 0 | COMMUNITY
Start: 2025-08-22

## 2025-08-25 ENCOUNTER — SPECIALTY PHARMACY (OUTPATIENT)
Dept: ENDOCRINOLOGY | Age: 50
End: 2025-08-25
Payer: COMMERCIAL

## 2025-08-25 ENCOUNTER — TREATMENT (OUTPATIENT)
Dept: ENDOCRINOLOGY | Age: 50
End: 2025-08-25
Payer: COMMERCIAL

## 2025-08-28 ENCOUNTER — SPECIALTY PHARMACY (OUTPATIENT)
Dept: ENDOCRINOLOGY | Age: 50
End: 2025-08-28
Payer: COMMERCIAL

## 2025-08-28 DIAGNOSIS — E11.65 TYPE 2 DIABETES MELLITUS WITH HYPERGLYCEMIA, WITH LONG-TERM CURRENT USE OF INSULIN: Primary | ICD-10-CM

## 2025-08-28 DIAGNOSIS — Z79.4 TYPE 2 DIABETES MELLITUS WITH HYPERGLYCEMIA, WITH LONG-TERM CURRENT USE OF INSULIN: Primary | ICD-10-CM

## 2025-08-28 RX ORDER — HYDROCHLOROTHIAZIDE 12.5 MG/1
CAPSULE ORAL
Qty: 2 EACH | Refills: 2 | Status: SHIPPED | OUTPATIENT
Start: 2025-08-28

## (undated) DEVICE — SAFELINER SUCTION CANISTER 1000CC: Brand: DEROYAL

## (undated) DEVICE — FRCP BX RADJAW4 NDL 2.8 240CM LG OG BX40

## (undated) DEVICE — THE BITE BLOCK MAXI, LATEX FREE STRAP IS USED TO PROTECT THE ENDOSCOPE INSERTION TUBE FROM BEING BITTEN BY THE PATIENT.

## (undated) DEVICE — SNAR POLYP CAPTIFLX MICRO OVL 13MM 240CM

## (undated) DEVICE — BW-412T DISP COMBO CLEANING BRUSH: Brand: SINGLE USE COMBINATION CLEANING BRUSH

## (undated) DEVICE — SYR LL 3CC

## (undated) DEVICE — Device

## (undated) DEVICE — GOWN ,SIRUS,NONREINFORCED 3XL: Brand: MEDLINE

## (undated) DEVICE — VIAL FORMALIN CAP 10P 40ML

## (undated) DEVICE — ADAPT CLN BIOGUARD AIR/H2O DISP

## (undated) DEVICE — SPNG GZ WOVN 4X4IN 12PLY 10/BX STRL

## (undated) DEVICE — KT ORCA ORCAPOD DISP STRL